# Patient Record
Sex: MALE | Race: BLACK OR AFRICAN AMERICAN | NOT HISPANIC OR LATINO | Employment: FULL TIME | URBAN - METROPOLITAN AREA
[De-identification: names, ages, dates, MRNs, and addresses within clinical notes are randomized per-mention and may not be internally consistent; named-entity substitution may affect disease eponyms.]

---

## 2017-09-12 ENCOUNTER — ALLSCRIPTS OFFICE VISIT (OUTPATIENT)
Dept: OTHER | Facility: OTHER | Age: 22
End: 2017-09-12

## 2017-09-12 DIAGNOSIS — E66.01 MORBID (SEVERE) OBESITY DUE TO EXCESS CALORIES (HCC): ICD-10-CM

## 2017-09-12 DIAGNOSIS — R63.5 ABNORMAL WEIGHT GAIN: ICD-10-CM

## 2017-09-12 DIAGNOSIS — I10 ESSENTIAL (PRIMARY) HYPERTENSION: ICD-10-CM

## 2017-09-12 DIAGNOSIS — L60.0 INGROWING NAIL: ICD-10-CM
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
## 2018-01-13 VITALS
RESPIRATION RATE: 16 BRPM | BODY MASS INDEX: 44.12 KG/M2 | HEIGHT: 70 IN | TEMPERATURE: 98.7 F | WEIGHT: 308.2 LBS | DIASTOLIC BLOOD PRESSURE: 100 MMHG | HEART RATE: 72 BPM | SYSTOLIC BLOOD PRESSURE: 160 MMHG

## 2018-04-16 RX ORDER — ENALAPRIL MALEATE 5 MG/1
1 TABLET ORAL 2 TIMES DAILY
COMMUNITY
Start: 2014-01-30 | End: 2018-04-17

## 2018-04-16 RX ORDER — ATENOLOL 25 MG/1
1 TABLET ORAL DAILY
COMMUNITY
Start: 2015-01-22 | End: 2018-04-17

## 2018-04-17 ENCOUNTER — OFFICE VISIT (OUTPATIENT)
Dept: FAMILY MEDICINE CLINIC | Facility: CLINIC | Age: 23
End: 2018-04-17
Payer: COMMERCIAL

## 2018-04-17 VITALS
DIASTOLIC BLOOD PRESSURE: 106 MMHG | TEMPERATURE: 98.2 F | WEIGHT: 315 LBS | BODY MASS INDEX: 46.65 KG/M2 | HEART RATE: 100 BPM | RESPIRATION RATE: 16 BRPM | OXYGEN SATURATION: 97 % | SYSTOLIC BLOOD PRESSURE: 158 MMHG | HEIGHT: 69 IN

## 2018-04-17 DIAGNOSIS — E66.01 MORBID OBESITY WITH BODY MASS INDEX OF 40.0-49.9 (HCC): ICD-10-CM

## 2018-04-17 DIAGNOSIS — J06.9 ACUTE URI: Primary | ICD-10-CM

## 2018-04-17 DIAGNOSIS — I10 ESSENTIAL HYPERTENSION: ICD-10-CM

## 2018-04-17 PROCEDURE — 99214 OFFICE O/P EST MOD 30 MIN: CPT | Performed by: NURSE PRACTITIONER

## 2018-04-17 PROCEDURE — 3008F BODY MASS INDEX DOCD: CPT | Performed by: NURSE PRACTITIONER

## 2018-04-17 PROCEDURE — 1036F TOBACCO NON-USER: CPT | Performed by: NURSE PRACTITIONER

## 2018-04-17 RX ORDER — ENALAPRIL MALEATE 5 MG/1
5 TABLET ORAL 2 TIMES DAILY
Qty: 30 TABLET | Refills: 5 | Status: SHIPPED | OUTPATIENT
Start: 2018-04-17

## 2018-04-17 RX ORDER — AZITHROMYCIN 250 MG/1
TABLET, FILM COATED ORAL
Qty: 6 TABLET | Refills: 0 | Status: SHIPPED | OUTPATIENT
Start: 2018-04-17 | End: 2018-04-21

## 2018-04-17 RX ORDER — ATENOLOL 25 MG/1
25 TABLET ORAL DAILY
Qty: 30 TABLET | Refills: 5 | Status: SHIPPED | OUTPATIENT
Start: 2018-04-17

## 2018-04-17 NOTE — PATIENT INSTRUCTIONS
Antibiotic sent to pharmacy   Increase fluids and rest  OTC Flonase and Delsym as needed  Warm salt water gargles, throat lozenges   OTC Tylenol as needed, not to exceed 3g/ 24 hours    Start Atenolol and Enalapril- both are at pharmacy   Check BP twice daily and keep a log   Bring log to follow up appt in 2 weeks   Follow a low sodium diet, work on weight loss  Schedule Sleep Study

## 2018-04-17 NOTE — PROGRESS NOTES
Chief Complaint   Patient presents with    Cough     5 days, sometimes productive cough    Nasal Congestion    Sore Throat     Assessment/Plan:    1  Acute URI- to start Azithromycin as ordered  SE reviewed  Increase PO fluids and rest   Warm salt water gargles, throat lozenges  OTC Delsym prn  OTC Flonase prn    2  HTN- uncontrolled  Pt is noncompliant with his medications  Both the Atenolol and Enalapril were refilled today and he was educated on the importance of compliance  I also discussed the potential complications of uncontrolled HTN and he verbalized understanding and agreement  He needs to check his BP BID and keep a log  Bring log to f/u appt in 2 weeks for BP check  Also bring BP machine to appt  Follow a low sodium diet, work on weight loss  I reprinted the order for the Sleep Study which was previously ordered by Dr Liv Fried 9/2017    3  Morbid obesity- advised regular exercise 30 minutes 5 x per week, work on weight loss  I recommended he consider seeing a Nutritionist but he declined this today     RTO 2 weeks for BP check      Diagnoses and all orders for this visit:    Acute URI  -     azithromycin (ZITHROMAX) 250 mg tablet; Take 2 tablets today then 1 tablet daily x 4 days    Essential hypertension  -     enalapril (VASOTEC) 5 mg tablet; Take 1 tablet (5 mg total) by mouth 2 (two) times a day  -     atenolol (TENORMIN) 25 mg tablet; Take 1 tablet (25 mg total) by mouth daily    Morbid obesity with body mass index of 40 0-49 9 ([facility])          Subjective:      Patient ID: Mechelle Post is a 20 y o  male  HPI   C/o a moist cough for the past 5 days  +nasal congestion   +sore throat  No rhinorrhea   No fevers, chills, SOB, wheezing  No N/V/D, appetite is normal   Needs a work note today     Non smoker  No H/O asthma    HTN- noncompliant  He is not taking the Enalapril or Atenolol      When I inquired why he is not taking these, he responded that he has heard about a lot of people with allergies to these meds  He himself has not experienced any allergic reactions or side effects with these meds  He is also not checking his BP at home  Denies chest pain, palpitations, edema, SOB, dizziness, change in vision, tinnitus, headaches  No regular physical activity   Unremarkable Kidney and Renal Artery U/S from 2015  Echo 2014 with EF at 67%  He had a sleep study ordered by Dr Zena Oliveira 9/2017 but he has not scheduled this yet         The following portions of the patient's history were reviewed and updated as appropriate: allergies, current medications, past medical history, past social history and problem list     Review of Systems   Constitutional: Negative for appetite change, chills, diaphoresis, fatigue and fever  HENT: Positive for congestion, postnasal drip and sore throat  Negative for ear discharge, ear pain, sinus pain, sinus pressure and tinnitus  Eyes: Negative for discharge, itching and visual disturbance  Respiratory: Positive for cough  Negative for chest tightness, shortness of breath and wheezing  Cardiovascular: Negative for chest pain, palpitations and leg swelling  Gastrointestinal: Negative for abdominal pain, constipation, diarrhea and nausea  Musculoskeletal: Negative for arthralgias and myalgias  Skin: Negative for rash  Neurological: Negative for dizziness, weakness, light-headedness, numbness and headaches  Hematological: Negative for adenopathy  Objective:      BP (!) 158/106 (BP Location: Left arm, Patient Position: Sitting)   Pulse 100   Temp 98 2 °F (36 8 °C) (Tympanic)   Resp 16   Ht 5' 9" (1 753 m)   Wt (!) 144 kg (317 lb)   SpO2 97%   BMI 46 81 kg/m²          Physical Exam   Constitutional: He is oriented to person, place, and time  He appears well-developed and well-nourished  No distress  Morbidly obese    HENT:   Head: Normocephalic and atraumatic     Right Ear: Tympanic membrane, external ear and ear canal normal    Left Ear: Tympanic membrane, external ear and ear canal normal    Nose: Mucosal edema and rhinorrhea present  Right sinus exhibits no maxillary sinus tenderness and no frontal sinus tenderness  Left sinus exhibits no maxillary sinus tenderness and no frontal sinus tenderness  Mouth/Throat: Posterior oropharyngeal erythema present  No oropharyngeal exudate  Eyes: Conjunctivae are normal  Pupils are equal, round, and reactive to light  Neck: Normal range of motion  Cardiovascular: Normal rate, regular rhythm and normal heart sounds  No murmur heard  Pulmonary/Chest: Effort normal and breath sounds normal  No respiratory distress  He has no wheezes  Abdominal: Soft  Bowel sounds are normal  He exhibits no distension  There is no tenderness  Musculoskeletal: Normal range of motion  He exhibits no edema  Lymphadenopathy:     He has no cervical adenopathy  Neurological: He is alert and oriented to person, place, and time  Skin: Skin is warm and dry  He is not diaphoretic  Psychiatric: He has a normal mood and affect

## 2018-04-17 NOTE — LETTER
April 17, 2018     Patient: Jose Ruiz   YOB: 1995   Date of Visit: 4/17/2018       To Whom it May Concern:    Jose Ruiz is under my professional care  He was seen in my office on 4/17/2018  He may return to work on 4/18/18  Please excuse from work on 4/16/18 and 4/17/18  If you have any questions or concerns, please don't hesitate to call           Sincerely,          MITRA Styles        CC: No Recipients

## 2022-02-23 ENCOUNTER — APPOINTMENT (EMERGENCY)
Dept: RADIOLOGY | Facility: HOSPITAL | Age: 27
DRG: 218 | End: 2022-02-23
Payer: OTHER MISCELLANEOUS

## 2022-02-23 ENCOUNTER — HOSPITAL ENCOUNTER (INPATIENT)
Facility: HOSPITAL | Age: 27
LOS: 10 days | Discharge: HOME HEALTH | DRG: 218 | End: 2022-03-05
Attending: STUDENT IN AN ORGANIZED HEALTH CARE EDUCATION/TRAINING PROGRAM | Admitting: STUDENT IN AN ORGANIZED HEALTH CARE EDUCATION/TRAINING PROGRAM
Payer: OTHER MISCELLANEOUS

## 2022-02-23 DIAGNOSIS — I50.9 CHRONIC CONGESTIVE HEART FAILURE, UNSPECIFIED HEART FAILURE TYPE (HCC): ICD-10-CM

## 2022-02-23 DIAGNOSIS — S82.892A CLOSED FRACTURE OF LEFT ANKLE, INITIAL ENCOUNTER: Primary | ICD-10-CM

## 2022-02-23 DIAGNOSIS — S82.872A CLOSED DISPLACED PILON FRACTURE OF LEFT TIBIA, INITIAL ENCOUNTER: ICD-10-CM
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
## 2022-02-23 LAB
2HR DELTA HS TROPONIN: 8 NG/L
4HR DELTA HS TROPONIN: 16 NG/L
ABO GROUP BLD: NORMAL
ABO GROUP BLD: NORMAL
ANION GAP SERPL CALCULATED.3IONS-SCNC: 5 MMOL/L (ref 4–13)
APTT PPP: 26 SECONDS (ref 23–37)
BASE EXCESS BLDA CALC-SCNC: 3 MMOL/L (ref -2–3)
BASOPHILS # BLD AUTO: 0.04 THOUSANDS/ΜL (ref 0–0.1)
BASOPHILS NFR BLD AUTO: 1 % (ref 0–1)
BLD GP AB SCN SERPL QL: NEGATIVE
BUN SERPL-MCNC: 11 MG/DL (ref 5–25)
CALCIUM SERPL-MCNC: 9 MG/DL (ref 8.3–10.1)
CARDIAC TROPONIN I PNL SERPL HS: 78 NG/L
CARDIAC TROPONIN I PNL SERPL HS: 86 NG/L
CARDIAC TROPONIN I PNL SERPL HS: 94 NG/L
CFFMA (FUNCTIONAL FIBRINOGEN MAX AMPLITUDE): 20.9 MM (ref 15–32)
CHLORIDE SERPL-SCNC: 105 MMOL/L (ref 100–108)
CKLY30: 0.4 % (ref 0–2.6)
CKR(REACTION TIME): 6.4 MIN (ref 4.6–9.1)
CO2 SERPL-SCNC: 28 MMOL/L (ref 21–32)
CREAT SERPL-MCNC: 1.25 MG/DL (ref 0.6–1.3)
CRTMA(RAPIDTEG MAX AMPLITUDE): 62.9 MM (ref 52–70)
EOSINOPHIL # BLD AUTO: 0.15 THOUSAND/ΜL (ref 0–0.61)
EOSINOPHIL NFR BLD AUTO: 2 % (ref 0–6)
ERYTHROCYTE [DISTWIDTH] IN BLOOD BY AUTOMATED COUNT: 13.7 % (ref 11.6–15.1)
GFR SERPL CREATININE-BSD FRML MDRD: 78 ML/MIN/1.73SQ M
GLUCOSE SERPL-MCNC: 108 MG/DL (ref 65–140)
GLUCOSE SERPL-MCNC: 163 MG/DL (ref 65–140)
GLUCOSE SERPL-MCNC: 166 MG/DL (ref 65–140)
GLUCOSE SERPL-MCNC: 209 MG/DL (ref 65–140)
HCO3 BLDA-SCNC: 29 MMOL/L (ref 24–30)
HCT VFR BLD AUTO: 44.6 % (ref 36.5–49.3)
HCT VFR BLD CALC: 43 % (ref 36.5–49.3)
HGB BLD-MCNC: 13.5 G/DL (ref 12–17)
HGB BLDA-MCNC: 14.6 G/DL (ref 12–17)
HOLD SPECIMEN: NORMAL
IMM GRANULOCYTES # BLD AUTO: 0.03 THOUSAND/UL (ref 0–0.2)
IMM GRANULOCYTES NFR BLD AUTO: 0 % (ref 0–2)
INR PPP: 1.01 (ref 0.84–1.19)
LYMPHOCYTES # BLD AUTO: 1.53 THOUSANDS/ΜL (ref 0.6–4.47)
LYMPHOCYTES NFR BLD AUTO: 20 % (ref 14–44)
MCH RBC QN AUTO: 27.2 PG (ref 26.8–34.3)
MCHC RBC AUTO-ENTMCNC: 30.3 G/DL (ref 31.4–37.4)
MCV RBC AUTO: 90 FL (ref 82–98)
MONOCYTES # BLD AUTO: 0.61 THOUSAND/ΜL (ref 0.17–1.22)
MONOCYTES NFR BLD AUTO: 8 % (ref 4–12)
NEUTROPHILS # BLD AUTO: 5.14 THOUSANDS/ΜL (ref 1.85–7.62)
NEUTS SEG NFR BLD AUTO: 69 % (ref 43–75)
NRBC BLD AUTO-RTO: 0 /100 WBCS
PCO2 BLD: 30 MMOL/L (ref 21–32)
PCO2 BLD: 49.6 MM HG (ref 42–50)
PH BLD: 7.37 [PH] (ref 7.3–7.4)
PLATELET # BLD AUTO: 257 THOUSANDS/UL (ref 149–390)
PMV BLD AUTO: 10.9 FL (ref 8.9–12.7)
PO2 BLD: 32 MM HG (ref 35–45)
POTASSIUM BLD-SCNC: 3.7 MMOL/L (ref 3.5–5.3)
POTASSIUM SERPL-SCNC: 3.6 MMOL/L (ref 3.5–5.3)
PROTHROMBIN TIME: 12.9 SECONDS (ref 11.6–14.5)
RBC # BLD AUTO: 4.96 MILLION/UL (ref 3.88–5.62)
RH BLD: POSITIVE
RH BLD: POSITIVE
SAO2 % BLD FROM PO2: 58 % (ref 60–85)
SODIUM BLD-SCNC: 141 MMOL/L (ref 136–145)
SODIUM SERPL-SCNC: 138 MMOL/L (ref 136–145)
SPECIMEN EXPIRATION DATE: NORMAL
SPECIMEN SOURCE: ABNORMAL
WBC # BLD AUTO: 7.5 THOUSAND/UL (ref 4.31–10.16)

## 2022-02-23 PROCEDURE — 82947 ASSAY GLUCOSE BLOOD QUANT: CPT

## 2022-02-23 PROCEDURE — 85025 COMPLETE CBC W/AUTO DIFF WBC: CPT | Performed by: STUDENT IN AN ORGANIZED HEALTH CARE EDUCATION/TRAINING PROGRAM

## 2022-02-23 PROCEDURE — 36415 COLL VENOUS BLD VENIPUNCTURE: CPT

## 2022-02-23 PROCEDURE — 82948 REAGENT STRIP/BLOOD GLUCOSE: CPT

## 2022-02-23 PROCEDURE — G1004 CDSM NDSC: HCPCS

## 2022-02-23 PROCEDURE — 86901 BLOOD TYPING SEROLOGIC RH(D): CPT | Performed by: STUDENT IN AN ORGANIZED HEALTH CARE EDUCATION/TRAINING PROGRAM

## 2022-02-23 PROCEDURE — 86850 RBC ANTIBODY SCREEN: CPT | Performed by: STUDENT IN AN ORGANIZED HEALTH CARE EDUCATION/TRAINING PROGRAM

## 2022-02-23 PROCEDURE — 73610 X-RAY EXAM OF ANKLE: CPT

## 2022-02-23 PROCEDURE — 99285 EMERGENCY DEPT VISIT HI MDM: CPT

## 2022-02-23 PROCEDURE — 71260 CT THORAX DX C+: CPT

## 2022-02-23 PROCEDURE — 85397 CLOTTING FUNCT ACTIVITY: CPT | Performed by: STUDENT IN AN ORGANIZED HEALTH CARE EDUCATION/TRAINING PROGRAM

## 2022-02-23 PROCEDURE — 84132 ASSAY OF SERUM POTASSIUM: CPT

## 2022-02-23 PROCEDURE — 99223 1ST HOSP IP/OBS HIGH 75: CPT | Performed by: STUDENT IN AN ORGANIZED HEALTH CARE EDUCATION/TRAINING PROGRAM

## 2022-02-23 PROCEDURE — 74177 CT ABD & PELVIS W/CONTRAST: CPT

## 2022-02-23 PROCEDURE — 96374 THER/PROPH/DIAG INJ IV PUSH: CPT

## 2022-02-23 PROCEDURE — NC001 PR NO CHARGE: Performed by: EMERGENCY MEDICINE

## 2022-02-23 PROCEDURE — 85730 THROMBOPLASTIN TIME PARTIAL: CPT | Performed by: ORTHOPAEDIC SURGERY

## 2022-02-23 PROCEDURE — 85610 PROTHROMBIN TIME: CPT | Performed by: ORTHOPAEDIC SURGERY

## 2022-02-23 PROCEDURE — 82803 BLOOD GASES ANY COMBINATION: CPT

## 2022-02-23 PROCEDURE — 73700 CT LOWER EXTREMITY W/O DYE: CPT

## 2022-02-23 PROCEDURE — 70450 CT HEAD/BRAIN W/O DYE: CPT

## 2022-02-23 PROCEDURE — 71045 X-RAY EXAM CHEST 1 VIEW: CPT

## 2022-02-23 PROCEDURE — 85384 FIBRINOGEN ACTIVITY: CPT | Performed by: STUDENT IN AN ORGANIZED HEALTH CARE EDUCATION/TRAINING PROGRAM

## 2022-02-23 PROCEDURE — 85014 HEMATOCRIT: CPT

## 2022-02-23 PROCEDURE — 73590 X-RAY EXAM OF LOWER LEG: CPT

## 2022-02-23 PROCEDURE — 93005 ELECTROCARDIOGRAM TRACING: CPT

## 2022-02-23 PROCEDURE — 84484 ASSAY OF TROPONIN QUANT: CPT | Performed by: STUDENT IN AN ORGANIZED HEALTH CARE EDUCATION/TRAINING PROGRAM

## 2022-02-23 PROCEDURE — 84295 ASSAY OF SERUM SODIUM: CPT

## 2022-02-23 PROCEDURE — 85576 BLOOD PLATELET AGGREGATION: CPT | Performed by: STUDENT IN AN ORGANIZED HEALTH CARE EDUCATION/TRAINING PROGRAM

## 2022-02-23 PROCEDURE — 85347 COAGULATION TIME ACTIVATED: CPT | Performed by: STUDENT IN AN ORGANIZED HEALTH CARE EDUCATION/TRAINING PROGRAM

## 2022-02-23 PROCEDURE — 86900 BLOOD TYPING SEROLOGIC ABO: CPT | Performed by: STUDENT IN AN ORGANIZED HEALTH CARE EDUCATION/TRAINING PROGRAM

## 2022-02-23 PROCEDURE — 80048 BASIC METABOLIC PNL TOTAL CA: CPT | Performed by: STUDENT IN AN ORGANIZED HEALTH CARE EDUCATION/TRAINING PROGRAM

## 2022-02-23 PROCEDURE — 72125 CT NECK SPINE W/O DYE: CPT

## 2022-02-23 RX ORDER — POLYETHYLENE GLYCOL 3350 17 G/17G
17 POWDER, FOR SOLUTION ORAL DAILY
Status: DISCONTINUED | OUTPATIENT
Start: 2022-02-23 | End: 2022-03-05 | Stop reason: HOSPADM

## 2022-02-23 RX ORDER — FENTANYL CITRATE 50 UG/ML
INJECTION, SOLUTION INTRAMUSCULAR; INTRAVENOUS CODE/TRAUMA/SEDATION MEDICATION
Status: COMPLETED | OUTPATIENT
Start: 2022-02-23 | End: 2022-02-23

## 2022-02-23 RX ORDER — CARVEDILOL 25 MG/1
25 TABLET ORAL 2 TIMES DAILY WITH MEALS
COMMUNITY

## 2022-02-23 RX ORDER — SPIRONOLACTONE 25 MG/1
25 TABLET ORAL DAILY
Status: DISCONTINUED | OUTPATIENT
Start: 2022-02-23 | End: 2022-02-24

## 2022-02-23 RX ORDER — OXYCODONE HYDROCHLORIDE 5 MG/1
5 TABLET ORAL EVERY 4 HOURS PRN
Status: DISCONTINUED | OUTPATIENT
Start: 2022-02-23 | End: 2022-03-05 | Stop reason: HOSPADM

## 2022-02-23 RX ORDER — OXYCODONE HYDROCHLORIDE 10 MG/1
10 TABLET ORAL EVERY 4 HOURS PRN
Status: DISCONTINUED | OUTPATIENT
Start: 2022-02-23 | End: 2022-03-05 | Stop reason: HOSPADM

## 2022-02-23 RX ORDER — SPIRONOLACTONE 25 MG/1
25 TABLET ORAL DAILY
COMMUNITY

## 2022-02-23 RX ORDER — DOCUSATE SODIUM 100 MG/1
100 CAPSULE, LIQUID FILLED ORAL 2 TIMES DAILY
Status: DISCONTINUED | OUTPATIENT
Start: 2022-02-23 | End: 2022-03-05 | Stop reason: HOSPADM

## 2022-02-23 RX ORDER — ISOSORBIDE DINITRATE AND HYDRALAZINE HYDROCHLORIDE 37.5; 2 MG/1; MG/1
2 TABLET ORAL 3 TIMES DAILY
COMMUNITY

## 2022-02-23 RX ORDER — ATORVASTATIN CALCIUM 20 MG/1
20 TABLET, FILM COATED ORAL DAILY
COMMUNITY

## 2022-02-23 RX ORDER — SODIUM CHLORIDE, SODIUM GLUCONATE, SODIUM ACETATE, POTASSIUM CHLORIDE, MAGNESIUM CHLORIDE, SODIUM PHOSPHATE, DIBASIC, AND POTASSIUM PHOSPHATE .53; .5; .37; .037; .03; .012; .00082 G/100ML; G/100ML; G/100ML; G/100ML; G/100ML; G/100ML; G/100ML
125 INJECTION, SOLUTION INTRAVENOUS CONTINUOUS
Status: DISCONTINUED | OUTPATIENT
Start: 2022-02-23 | End: 2022-02-24

## 2022-02-23 RX ORDER — CALCIUM CARBONATE 200(500)MG
500 TABLET,CHEWABLE ORAL DAILY PRN
Status: DISCONTINUED | OUTPATIENT
Start: 2022-02-23 | End: 2022-03-05 | Stop reason: HOSPADM

## 2022-02-23 RX ORDER — IVABRADINE 5 MG/1
5 TABLET, FILM COATED ORAL 2 TIMES DAILY
COMMUNITY

## 2022-02-23 RX ORDER — LABETALOL 20 MG/4 ML (5 MG/ML) INTRAVENOUS SYRINGE
10 ONCE
Status: COMPLETED | OUTPATIENT
Start: 2022-02-23 | End: 2022-02-23

## 2022-02-23 RX ORDER — CARVEDILOL 25 MG/1
25 TABLET ORAL 2 TIMES DAILY WITH MEALS
Status: DISCONTINUED | OUTPATIENT
Start: 2022-02-23 | End: 2022-02-25

## 2022-02-23 RX ORDER — ACETAMINOPHEN 325 MG/1
975 TABLET ORAL EVERY 8 HOURS SCHEDULED
Status: DISCONTINUED | OUTPATIENT
Start: 2022-02-23 | End: 2022-03-05 | Stop reason: HOSPADM

## 2022-02-23 RX ORDER — HYDROMORPHONE HCL/PF 1 MG/ML
0.5 SYRINGE (ML) INJECTION EVERY 4 HOURS PRN
Status: DISCONTINUED | OUTPATIENT
Start: 2022-02-23 | End: 2022-03-05 | Stop reason: HOSPADM

## 2022-02-23 RX ORDER — SACUBITRIL AND VALSARTAN 97; 103 MG/1; MG/1
1 TABLET, FILM COATED ORAL 2 TIMES DAILY
COMMUNITY

## 2022-02-23 RX ORDER — EMPAGLIFLOZIN AND METFORMIN HYDROCHLORIDE 12.5; 5 MG/1; MG/1
TABLET ORAL 2 TIMES DAILY
COMMUNITY

## 2022-02-23 RX ORDER — ATORVASTATIN CALCIUM 20 MG/1
20 TABLET, FILM COATED ORAL DAILY
Status: DISCONTINUED | OUTPATIENT
Start: 2022-02-23 | End: 2022-03-05 | Stop reason: HOSPADM

## 2022-02-23 RX ADMIN — OXYCODONE HYDROCHLORIDE 10 MG: 10 TABLET ORAL at 23:35

## 2022-02-23 RX ADMIN — SPIRONOLACTONE 25 MG: 25 TABLET ORAL at 16:57

## 2022-02-23 RX ADMIN — IOHEXOL 100 ML: 350 INJECTION, SOLUTION INTRAVENOUS at 15:10

## 2022-02-23 RX ADMIN — LABETALOL HYDROCHLORIDE 10 MG: 5 INJECTION, SOLUTION INTRAVENOUS at 16:54

## 2022-02-23 RX ADMIN — ACETAMINOPHEN 975 MG: 325 TABLET ORAL at 21:20

## 2022-02-23 RX ADMIN — ACETAMINOPHEN 975 MG: 325 TABLET ORAL at 15:55

## 2022-02-23 RX ADMIN — OXYCODONE HYDROCHLORIDE 10 MG: 10 TABLET ORAL at 18:52

## 2022-02-23 RX ADMIN — SODIUM CHLORIDE, SODIUM GLUCONATE, SODIUM ACETATE, POTASSIUM CHLORIDE, MAGNESIUM CHLORIDE, SODIUM PHOSPHATE, DIBASIC, AND POTASSIUM PHOSPHATE 125 ML/HR: .53; .5; .37; .037; .03; .012; .00082 INJECTION, SOLUTION INTRAVENOUS at 16:02

## 2022-02-23 RX ADMIN — FENTANYL CITRATE 100 MCG: 50 INJECTION INTRAMUSCULAR; INTRAVENOUS at 14:59

## 2022-02-23 RX ADMIN — CALCIUM CARBONATE (ANTACID) CHEW TAB 500 MG 500 MG: 500 CHEW TAB at 23:28

## 2022-02-23 RX ADMIN — CARVEDILOL 25 MG: 25 TABLET, FILM COATED ORAL at 16:57

## 2022-02-23 RX ADMIN — ATORVASTATIN CALCIUM 20 MG: 20 TABLET, FILM COATED ORAL at 16:57
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
## 2022-02-24 PROBLEM — E78.5 DYSLIPIDEMIA: Status: ACTIVE | Noted: 2022-02-24

## 2022-02-24 PROBLEM — E11.9 DIABETES TYPE 2, CONTROLLED (HCC): Status: ACTIVE | Noted: 2022-02-24

## 2022-02-24 PROBLEM — G47.33 OSA (OBSTRUCTIVE SLEEP APNEA): Status: ACTIVE | Noted: 2022-02-24

## 2022-02-24 PROBLEM — S82.302A CLOSED FRACTURE OF DISTAL END OF LEFT TIBIA: Status: ACTIVE | Noted: 2022-02-24

## 2022-02-24 PROBLEM — I50.9 CHRONIC HEART FAILURE (HCC): Status: ACTIVE | Noted: 2022-02-24

## 2022-02-24 PROBLEM — G89.11 ACUTE PAIN DUE TO TRAUMA: Status: ACTIVE | Noted: 2022-02-24

## 2022-02-24 LAB
ANION GAP SERPL CALCULATED.3IONS-SCNC: 5 MMOL/L (ref 4–13)
APTT PPP: 26 SECONDS (ref 23–37)
ATRIAL RATE: 119 BPM
BASOPHILS # BLD AUTO: 0.04 THOUSANDS/ΜL (ref 0–0.1)
BASOPHILS NFR BLD AUTO: 1 % (ref 0–1)
BUN SERPL-MCNC: 10 MG/DL (ref 5–25)
CALCIUM SERPL-MCNC: 8.8 MG/DL (ref 8.3–10.1)
CHLORIDE SERPL-SCNC: 107 MMOL/L (ref 100–108)
CO2 SERPL-SCNC: 27 MMOL/L (ref 21–32)
CREAT SERPL-MCNC: 1.01 MG/DL (ref 0.6–1.3)
EOSINOPHIL # BLD AUTO: 0.19 THOUSAND/ΜL (ref 0–0.61)
EOSINOPHIL NFR BLD AUTO: 3 % (ref 0–6)
ERYTHROCYTE [DISTWIDTH] IN BLOOD BY AUTOMATED COUNT: 13.6 % (ref 11.6–15.1)
GFR SERPL CREATININE-BSD FRML MDRD: 101 ML/MIN/1.73SQ M
GLUCOSE SERPL-MCNC: 116 MG/DL (ref 65–140)
GLUCOSE SERPL-MCNC: 119 MG/DL (ref 65–140)
GLUCOSE SERPL-MCNC: 130 MG/DL (ref 65–140)
GLUCOSE SERPL-MCNC: 134 MG/DL (ref 65–140)
GLUCOSE SERPL-MCNC: 183 MG/DL (ref 65–140)
HCT VFR BLD AUTO: 39.1 % (ref 36.5–49.3)
HGB BLD-MCNC: 12.3 G/DL (ref 12–17)
IMM GRANULOCYTES # BLD AUTO: 0.04 THOUSAND/UL (ref 0–0.2)
IMM GRANULOCYTES NFR BLD AUTO: 1 % (ref 0–2)
INR PPP: 1.06 (ref 0.84–1.19)
LYMPHOCYTES # BLD AUTO: 1.26 THOUSANDS/ΜL (ref 0.6–4.47)
LYMPHOCYTES NFR BLD AUTO: 16 % (ref 14–44)
MAGNESIUM SERPL-MCNC: 2.6 MG/DL (ref 1.6–2.6)
MCH RBC QN AUTO: 27.6 PG (ref 26.8–34.3)
MCHC RBC AUTO-ENTMCNC: 31.5 G/DL (ref 31.4–37.4)
MCV RBC AUTO: 88 FL (ref 82–98)
MONOCYTES # BLD AUTO: 0.86 THOUSAND/ΜL (ref 0.17–1.22)
MONOCYTES NFR BLD AUTO: 11 % (ref 4–12)
NEUTROPHILS # BLD AUTO: 5.33 THOUSANDS/ΜL (ref 1.85–7.62)
NEUTS SEG NFR BLD AUTO: 68 % (ref 43–75)
NRBC BLD AUTO-RTO: 0 /100 WBCS
P AXIS: 51 DEGREES
PLATELET # BLD AUTO: 237 THOUSANDS/UL (ref 149–390)
PMV BLD AUTO: 10.8 FL (ref 8.9–12.7)
POTASSIUM SERPL-SCNC: 4.3 MMOL/L (ref 3.5–5.3)
PR INTERVAL: 172 MS
PROTHROMBIN TIME: 13.4 SECONDS (ref 11.6–14.5)
QRS AXIS: 78 DEGREES
QRSD INTERVAL: 94 MS
QT INTERVAL: 314 MS
QTC INTERVAL: 441 MS
RBC # BLD AUTO: 4.45 MILLION/UL (ref 3.88–5.62)
SODIUM SERPL-SCNC: 139 MMOL/L (ref 136–145)
T WAVE AXIS: 48 DEGREES
VENTRICULAR RATE: 119 BPM
WBC # BLD AUTO: 7.72 THOUSAND/UL (ref 4.31–10.16)

## 2022-02-24 PROCEDURE — 93010 ELECTROCARDIOGRAM REPORT: CPT | Performed by: INTERNAL MEDICINE

## 2022-02-24 PROCEDURE — 85610 PROTHROMBIN TIME: CPT

## 2022-02-24 PROCEDURE — 83735 ASSAY OF MAGNESIUM: CPT | Performed by: SURGERY

## 2022-02-24 PROCEDURE — 99255 IP/OBS CONSLTJ NEW/EST HI 80: CPT | Performed by: INTERNAL MEDICINE

## 2022-02-24 PROCEDURE — 85025 COMPLETE CBC W/AUTO DIFF WBC: CPT | Performed by: SURGERY

## 2022-02-24 PROCEDURE — 99233 SBSQ HOSP IP/OBS HIGH 50: CPT | Performed by: STUDENT IN AN ORGANIZED HEALTH CARE EDUCATION/TRAINING PROGRAM

## 2022-02-24 PROCEDURE — 80048 BASIC METABOLIC PNL TOTAL CA: CPT | Performed by: SURGERY

## 2022-02-24 PROCEDURE — NC001 PR NO CHARGE: Performed by: ORTHOPAEDIC SURGERY

## 2022-02-24 PROCEDURE — 85730 THROMBOPLASTIN TIME PARTIAL: CPT

## 2022-02-24 PROCEDURE — 99253 IP/OBS CNSLTJ NEW/EST LOW 45: CPT | Performed by: ORTHOPAEDIC SURGERY

## 2022-02-24 PROCEDURE — 82948 REAGENT STRIP/BLOOD GLUCOSE: CPT

## 2022-02-24 RX ORDER — SODIUM CHLORIDE, SODIUM LACTATE, POTASSIUM CHLORIDE, CALCIUM CHLORIDE 600; 310; 30; 20 MG/100ML; MG/100ML; MG/100ML; MG/100ML
50 INJECTION, SOLUTION INTRAVENOUS CONTINUOUS
Status: DISCONTINUED | OUTPATIENT
Start: 2022-02-25 | End: 2022-02-25

## 2022-02-24 RX ADMIN — SACUBITRIL AND VALSARTAN 1 TABLET: 97; 103 TABLET, FILM COATED ORAL at 17:00

## 2022-02-24 RX ADMIN — ACETAMINOPHEN 975 MG: 325 TABLET ORAL at 21:02

## 2022-02-24 RX ADMIN — DOCUSATE SODIUM 100 MG: 100 CAPSULE ORAL at 17:00

## 2022-02-24 RX ADMIN — SODIUM CHLORIDE, SODIUM LACTATE, POTASSIUM CHLORIDE, AND CALCIUM CHLORIDE 50 ML/HR: .6; .31; .03; .02 INJECTION, SOLUTION INTRAVENOUS at 23:38

## 2022-02-24 RX ADMIN — ACETAMINOPHEN 975 MG: 325 TABLET ORAL at 05:45

## 2022-02-24 RX ADMIN — ACETAMINOPHEN 975 MG: 325 TABLET ORAL at 14:07

## 2022-02-24 RX ADMIN — CARVEDILOL 25 MG: 25 TABLET, FILM COATED ORAL at 16:59

## 2022-02-24 RX ADMIN — CARVEDILOL 25 MG: 25 TABLET, FILM COATED ORAL at 08:27

## 2022-02-24 RX ADMIN — ENOXAPARIN SODIUM 60 MG: 60 INJECTION SUBCUTANEOUS at 21:06

## 2022-02-24 RX ADMIN — ATORVASTATIN CALCIUM 20 MG: 20 TABLET, FILM COATED ORAL at 08:27

## 2022-02-24 RX ADMIN — OXYCODONE HYDROCHLORIDE 10 MG: 10 TABLET ORAL at 08:27

## 2022-02-24 RX ADMIN — SPIRONOLACTONE 25 MG: 25 TABLET ORAL at 08:27

## 2022-02-24 RX ADMIN — ENOXAPARIN SODIUM 60 MG: 60 INJECTION SUBCUTANEOUS at 12:50

## 2022-02-24 RX ADMIN — OXYCODONE HYDROCHLORIDE 10 MG: 10 TABLET ORAL at 16:59

## 2022-02-24 NOTE — ASSESSMENT & PLAN NOTE
- left tibial fracture, present on admission   - operative planning on 02/24/2022  - Appreciate Orthopedic surgery evaluation, recommendations and interventions as noted  - Maintain non weightbearing status on the left lower extremity in splint   - Monitor left lower extremity neurovascular exam   - Continue multimodal analgesic regimen   - Continue DVT prophylaxis; 60 mg b i d   - PT and OT evaluation and treatment as indicated  - Outpatient follow up with Orthopedic surgery for re-evaluation  Imiquimod Pregnancy And Lactation Text: This medication is Pregnancy Category C. It is unknown if this medication is excreted in breast milk.
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
## 2022-02-24 NOTE — PLAN OF CARE
Problem: MOBILITY - ADULT  Goal: Maintain or return to baseline ADL function  Description: INTERVENTIONS:  -  Assess patient's ability to carry out ADLs; assess patient's baseline for ADL function and identify physical deficits which impact ability to perform ADLs (bathing, care of mouth/teeth, toileting, grooming, dressing, etc )  - Assess/evaluate cause of self-care deficits   - Assess range of motion  - Assess patient's mobility; develop plan if impaired  - Assess patient's need for assistive devices and provide as appropriate  - Encourage maximum independence but intervene and supervise when necessary  - Involve family in performance of ADLs  - Assess for home care needs following discharge   - Consider OT consult to assist with ADL evaluation and planning for discharge  - Provide patient education as appropriate  Outcome: Progressing  Goal: Maintains/Returns to pre admission functional level  Description: INTERVENTIONS:  - Perform BMAT or MOVE assessment daily    - Set and communicate daily mobility goal to care team and patient/family/caregiver     - Collaborate with rehabilitation services on mobility goals if consulted  - Out of bed for toileting  - Record patient progress and toleration of activity level   Outcome: Progressing     Problem: PAIN - ADULT  Goal: Verbalizes/displays adequate comfort level or baseline comfort level  Description: Interventions:  - Encourage patient to monitor pain and request assistance  - Assess pain using appropriate pain scale  - Administer analgesics based on type and severity of pain and evaluate response  - Implement non-pharmacological measures as appropriate and evaluate response  - Consider cultural and social influences on pain and pain management  - Notify physician/advanced practitioner if interventions unsuccessful or patient reports new pain  Outcome: Progressing     Problem: INFECTION - ADULT  Goal: Absence or prevention of progression during hospitalization  Description: INTERVENTIONS:  - Assess and monitor for signs and symptoms of infection  - Monitor lab/diagnostic results  - Monitor all insertion sites, i e  indwelling lines, tubes, and drains  - Monitor endotracheal if appropriate and nasal secretions for changes in amount and color  - [place] appropriate cooling/warming therapies per order  - Administer medications as ordered  - Instruct and encourage patient and family to use good hand hygiene technique  - Identify and instruct in appropriate isolation precautions for identified infection/condition  Outcome: Progressing  Goal: Absence of fever/infection during neutropenic period  Description: INTERVENTIONS:  - Monitor WBC    Outcome: Progressing     Problem: SAFETY ADULT  Goal: Maintain or return to baseline ADL function  Description: INTERVENTIONS:  -  Assess patient's ability to carry out ADLs; assess patient's baseline for ADL function and identify physical deficits which impact ability to perform ADLs (bathing, care of mouth/teeth, toileting, grooming, dressing, etc )  - Assess/evaluate cause of self-care deficits   - Assess range of motion  - Assess patient's mobility; develop plan if impaired  - Assess patient's need for assistive devices and provide as appropriate  - Encourage maximum independence but intervene and supervise when necessary  - Involve family in performance of ADLs  - Assess for home care needs following discharge   - Consider OT consult to assist with ADL evaluation and planning for discharge  - Provide patient education as appropriate  Outcome: Progressing  Goal: Maintains/Returns to pre admission functional level  Description: INTERVENTIONS:  - Perform BMAT or MOVE assessment daily    - Set and communicate daily mobility goal to care team and patient/family/caregiver     - Collaborate with rehabilitation services on mobility goals if consulted  - Out of bed for toileting  - Record patient progress and toleration of activity level   Outcome: Progressing  Goal: Patient will remain free of falls  Description: INTERVENTIONS:  - Educate patient/family on patient safety including physical limitations  - Instruct patient to call for assistance with activity   - Consult OT/PT to assist with strengthening/mobility   - Keep Call bell within reach  - Keep bed low and locked with side rails adjusted as appropriate  - Keep care items and personal belongings within reach  - Initiate and maintain comfort rounds  - Make Fall Risk Sign visible to staff  - Offer Toileting every 2 Hours, in advance of need  - Initiate/Maintain bed alarm  - Obtain necessary fall risk management equipment  - Apply yellow socks and bracelet for high fall risk patients  - Consider moving patient to room near nurses station  Outcome: Progressing     Problem: DISCHARGE PLANNING  Goal: Discharge to home or other facility with appropriate resources  Description: INTERVENTIONS:  - Identify barriers to discharge w/patient and caregiver  - Arrange for needed discharge resources and transportation as appropriate  - Identify discharge learning needs (meds, wound care, etc )  - Arrange for interpretive services to assist at discharge as needed  - Refer to Case Management Department for coordinating discharge planning if the patient needs post-hospital services based on physician/advanced practitioner order or complex needs related to functional status, cognitive ability, or social support system  Outcome: Progressing     Problem: Knowledge Deficit  Goal: Patient/family/caregiver demonstrates understanding of disease process, treatment plan, medications, and discharge instructions  Description: Complete learning assessment and assess knowledge base    Interventions:  - Provide teaching at level of understanding  - Provide teaching via preferred learning methods  Outcome: Progressing
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
## 2022-02-24 NOTE — PROGRESS NOTES
Progress Note - Orthopedics   Luigi Shark 31 y o  male MRN: [id number]  Unit/Bed#: [facility] 611-01      Subjective:    31 y o male with left distal tibia fracture  Pain controlled  No further issues overnight       Labs:  0   Lab Value Date/Time    HCT 39 1 02/24/2022 0526    HCT 44 6 02/23/2022 1502    HCT 43 02/23/2022 1501    HGB 12 3 02/24/2022 0526    HGB 13 5 02/23/2022 1502    HGB 14 6 02/23/2022 1501    INR 1 06 02/24/2022 0526    WBC 7 72 02/24/2022 0526    WBC 7 50 02/23/2022 1502       Meds:    Current Facility-Administered Medications:     acetaminophen (TYLENOL) tablet 975 mg, 975 mg, Oral, Q8H [address], Jaylen Liu MD, 975 mg at 02/24/22 0545    atorvastatin (LIPITOR) tablet 20 mg, 20 mg, Oral, Daily, Jaylen Liu MD, 20 mg at 02/23/22 1657    calcium carbonate (TUMS) chewable tablet 500 mg, 500 mg, Oral, Daily PRN, Tyler Quinones MD, 500 mg at 02/23/22 2328    carvedilol (COREG) tablet 25 mg, 25 mg, Oral, BID With Meals, Jaylen Liu MD, 25 mg at 02/23/22 1657    docusate sodium (COLACE) capsule 100 mg, 100 mg, Oral, BID, Jaylen Liu MD    HYDROmorphone (DILAUDID) injection 0 5 mg, 0 5 mg, Intravenous, Q4H PRN, Jaylen Liu MD    insulin lispro (HumaLOG) 100 units/mL subcutaneous injection 2-12 Units, 2-12 Units, Subcutaneous, Q6H [address] **AND** Fingerstick Glucose (POCT), , , Q6H, Tray Chen MD    multi-electrolyte (PLASMALYTE-A/ISOLYTE-S PH 7 4) IV solution, 125 mL/hr, Intravenous, Continuous, Tray Chen MD, Last Rate: 125 mL/hr at 02/23/22 1602, 125 mL/hr at 02/23/22 1602    oxyCODONE (ROXICODONE) immediate release tablet 10 mg, 10 mg, Oral, Q4H PRN, Jaylen Liu MD, 10 mg at 02/23/22 2335    oxyCODONE (ROXICODONE) IR tablet 5 mg, 5 mg, Oral, Q4H PRN, Jaylen Liu MD    polyethylene glycol (MIRALAX) packet 17 g, 17 g, Oral, Daily, Jaylen Liu MD    spironolactone (ALDACTONE) tablet 25 mg, 25 mg, Oral, Daily, Emmy Lee MD, 25 mg at 02/23/22 2197    Blood Culture:   No results found for: BLOODCX    Wound Culture:   No results found for: WOUNDCULT    Ins and Outs:  I/O last 24 hours: In: -   Out: 350 [Urine:350]          Physical:  Vitals:    02/24/22 0324   BP: 148/97   Pulse: 85   Resp: 20   Temp: 98 °F (36 7 °C)   SpO2: 91%     Musculoskeletal: left Lower Extremity  · Splint in place  · TTP at the distal tibia  · Calf soft and compressible   · Sensation intact to light touch jaki/saph/sp/dp/tib to exposed areas  · Motor intact EHL/FHL  · Toes warm and well perfused      Assessment:    26 y o male with left distal tibia fracture that would benefit from operative fixation should patient be able to be optimized from a medical and cardiac standpoint        Plan:  · NWB LLE in splint  · Will monitor for ABLA   · NPO  · OR today if cleared from medical standpoint   · PT/OT post op   · Pain control  · DVT PPX: hold this AM in preparation for surgery  · Dispo: Ortho will follow    Shahrzad Ignacio MD

## 2022-02-24 NOTE — ASSESSMENT & PLAN NOTE
Wt Readings from Last 3 Encounters:   02/23/22 (!) 160 kg (352 lb 11 8 oz)     - EF baseline is 20-25%  - cardiology following, appreciate input  - resume home medications  - outpatient follow-up with Cardiology  - no other acute workup at this time  - will need to resume all CHF medications on 02/25/2020; pending cardiology follow-up  - currently 25 mg b i d , isosorbide-hydralazine 40-75 mg t i d , ivabradine 5 mg b i d , sacubitril-valsartan  mg b i d , and spironolactone 25 mg daily

## 2022-02-24 NOTE — PHYSICAL THERAPY NOTE
Physical Therapy Cancellation Note    PT orders received and chart reviewed  Pt pending OR with ortho for fixation of L distal tibia fx  Will defer PT eval at this time      Guzman Parekh, PT, DPT

## 2022-02-24 NOTE — ASSESSMENT & PLAN NOTE
Wt Readings from Last 3 Encounters:   02/23/22 (!) 160 kg (352 lb 11 8 oz)     - EF baseline is 20-25%  - cardiology following, appreciate input  - resume home medications  - outpatient follow-up with Cardiology  - no other acute workup at this time  - will need to resume all CHF medications on 02/25/2020; pending cardiology follow-up  - currently taking 25 mg b i d , sacubitril-valsartan  mg b i d   - Will resume spironolactone 25 mg daily postop

## 2022-02-24 NOTE — ASSESSMENT & PLAN NOTE
No results found for: HGBA1C    Recent Labs     02/23/22  2333 02/24/22  0103 02/24/22  0544 02/24/22  1129   POCGLU 209* 183* 119 130       Blood Sugar Average: Last 72 hrs:  (P) 149 8     - continue sliding scale insulin  - close glucose control in the setting of orthopedic injury

## 2022-02-24 NOTE — ASSESSMENT & PLAN NOTE
No results found for: HGBA1C    Recent Labs     02/23/22  2333 02/24/22  0103 02/24/22  0544 02/24/22  1129   POCGLU 209* 183* 119 130       Blood Sugar Average: Last 72 hrs:  (P) 149 8     - continue sliding scale insulin  - close glucose control in the setting of orthopedic injury Adult
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
## 2022-02-24 NOTE — OCCUPATIONAL THERAPY NOTE
Occupational Therapy         Patient Name: Esteban NOVA Date: 2/24/2022 02/24/22 0900   OT Last Visit   OT Visit Date 02/24/22   Note Type   Note type Cancelled Session   Cancel Reasons Patient to operating room     Pending OR for fixation of ankle fx - will defer and address OT needs /discharge recommendations post op    Dc Jones, OT
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
## 2022-02-24 NOTE — ASSESSMENT & PLAN NOTE
- left tibial fracture, present on admission   - operative planning on 02/25/2022  - Appreciate Orthopedic surgery evaluation, recommendations and interventions as noted  - Maintain non weightbearing status on the left lower extremity in splint   - Monitor left lower extremity neurovascular exam   - Continue multimodal analgesic regimen   - Continue DVT prophylaxis; 60 mg b i d   - PT and OT evaluation and treatment as indicated  - Outpatient follow up with Orthopedic surgery for re-evaluation

## 2022-02-24 NOTE — PLAN OF CARE
Problem: MOBILITY - ADULT  Goal: Maintain or return to baseline ADL function  Description: INTERVENTIONS:  -  Assess patient's ability to carry out ADLs; assess patient's baseline for ADL function and identify physical deficits which impact ability to perform ADLs (bathing, care of mouth/teeth, toileting, grooming, dressing, etc )  - Assess/evaluate cause of self-care deficits   - Assess range of motion  - Assess patient's mobility; develop plan if impaired  - Assess patient's need for assistive devices and provide as appropriate  - Encourage maximum independence but intervene and supervise when necessary  - Involve family in performance of ADLs  - Assess for home care needs following discharge   - Consider OT consult to assist with ADL evaluation and planning for discharge  - Provide patient education as appropriate  Outcome: Progressing  Goal: Maintains/Returns to pre admission functional level  Description: INTERVENTIONS:  - Perform BMAT or MOVE assessment daily    - Set and communicate daily mobility goal to care team and patient/family/caregiver     - Collaborate with rehabilitation services on mobility goals if consulted  - Out of bed for toileting  - Record patient progress and toleration of activity level   Outcome: Progressing     Problem: PAIN - ADULT  Goal: Verbalizes/displays adequate comfort level or baseline comfort level  Description: Interventions:  - Encourage patient to monitor pain and request assistance  - Assess pain using appropriate pain scale  - Administer analgesics based on type and severity of pain and evaluate response  - Implement non-pharmacological measures as appropriate and evaluate response  - Consider cultural and social influences on pain and pain management  - Notify physician/advanced practitioner if interventions unsuccessful or patient reports new pain  Outcome: Progressing     Problem: INFECTION - ADULT  Goal: Absence or prevention of progression during hospitalization  Description: INTERVENTIONS:  - Assess and monitor for signs and symptoms of infection  - Monitor lab/diagnostic results  - Monitor all insertion sites, i e  indwelling lines, tubes, and drains  - [place] appropriate cooling/warming therapies per order  - Administer medications as ordered  - Instruct and encourage patient and family to use good hand hygiene technique  - Identify and instruct in appropriate isolation precautions for identified infection/condition  Outcome: Progressing  Goal: Absence of fever/infection during neutropenic period  Description: INTERVENTIONS:  - Monitor WBC    Outcome: Progressing     Problem: SAFETY ADULT  Goal: Maintain or return to baseline ADL function  Description: INTERVENTIONS:  -  Assess patient's ability to carry out ADLs; assess patient's baseline for ADL function and identify physical deficits which impact ability to perform ADLs (bathing, care of mouth/teeth, toileting, grooming, dressing, etc )  - Assess/evaluate cause of self-care deficits   - Assess range of motion  - Assess patient's mobility; develop plan if impaired  - Assess patient's need for assistive devices and provide as appropriate  - Encourage maximum independence but intervene and supervise when necessary  - Involve family in performance of ADLs  - Assess for home care needs following discharge   - Consider OT consult to assist with ADL evaluation and planning for discharge  - Provide patient education as appropriate  Outcome: Progressing  Goal: Maintains/Returns to pre admission functional level  Description: INTERVENTIONS:  - Perform BMAT or MOVE assessment daily    - Set and communicate daily mobility goal to care team and patient/family/caregiver     - Collaborate with rehabilitation services on mobility goals if consulted  - Out of bed for toileting  - Record patient progress and toleration of activity level   Outcome: Progressing  Goal: Patient will remain free of falls  Description: INTERVENTIONS:  - Educate patient/family on patient safety including physical limitations  - Instruct patient to call for assistance with activity   - Consult OT/PT to assist with strengthening/mobility   - Keep Call bell within reach  - Keep bed low and locked with side rails adjusted as appropriate  - Keep care items and personal belongings within reach  - Initiate and maintain comfort rounds  - Make Fall Risk Sign visible to staff  - Offer Toileting every 2 Hours, in advance of need  - Initiate/Maintain bed alarm  - Obtain necessary fall risk management equipment  - Apply yellow socks and bracelet for high fall risk patients  - Consider moving patient to room near nurses station  Outcome: Progressing     Problem: DISCHARGE PLANNING  Goal: Discharge to home or other facility with appropriate resources  Description: INTERVENTIONS:  - Identify barriers to discharge w/patient and caregiver  - Arrange for needed discharge resources and transportation as appropriate  - Identify discharge learning needs (meds, wound care, etc )  - Arrange for interpretive services to assist at discharge as needed  - Refer to Case Management Department for coordinating discharge planning if the patient needs post-hospital services based on physician/advanced practitioner order or complex needs related to functional status, cognitive ability, or social support system  Outcome: Progressing     Problem: Knowledge Deficit  Goal: Patient/family/caregiver demonstrates understanding of disease process, treatment plan, medications, and discharge instructions  Description: Complete learning assessment and assess knowledge base    Interventions:  - Provide teaching at level of understanding  - Provide teaching via preferred learning methods  Outcome: Progressing     Problem: Prexisting or High Potential for Compromised Skin Integrity  Goal: Skin integrity is maintained or improved  Description: INTERVENTIONS:  - Identify patients at risk for skin breakdown  - Assess and monitor skin integrity  - Assess and monitor nutrition and hydration status  - Monitor labs   - Assess for incontinence   - Turn and reposition patient  - Assist with mobility/ambulation  - Relieve pressure over bony prominences  - Avoid friction and shearing  - Provide appropriate hygiene as needed including keeping skin clean and dry  - Evaluate need for skin moisturizer/barrier cream  - Collaborate with interdisciplinary team   - Patient/family teaching  - Consider wound care consult   Outcome: Progressing     Problem: MUSCULOSKELETAL - ADULT  Goal: Maintain or return mobility to safest level of function  Description: INTERVENTIONS:  - Assess patient's ability to carry out ADLs; assess patient's baseline for ADL function and identify physical deficits which impact ability to perform ADLs (bathing, care of mouth/teeth, toileting, grooming, dressing, etc )  - Assess/evaluate cause of self-care deficits   - Assess range of motion  - Assess patient's mobility  - Assess patient's need for assistive devices and provide as appropriate  - Encourage maximum independence but intervene and supervise when necessary  - Involve family in performance of ADLs  - Assess for home care needs following discharge   - Consider OT consult to assist with ADL evaluation and planning for discharge  - Provide patient education as appropriate  Outcome: Progressing  Goal: Maintain proper alignment of affected body part  Description: INTERVENTIONS:  - Support, maintain and protect limb and body alignment  - Provide patient/ family with appropriate education  Outcome: Progressing

## 2022-02-25 ENCOUNTER — APPOINTMENT (INPATIENT)
Dept: RADIOLOGY | Facility: HOSPITAL | Age: 27
DRG: 218 | End: 2022-02-25
Payer: OTHER MISCELLANEOUS

## 2022-02-25 ENCOUNTER — APPOINTMENT (INPATIENT)
Dept: RADIOLOGY | Facility: HOSPITAL | Age: 27
DRG: 218 | End: 2022-02-25
Attending: ANESTHESIOLOGY
Payer: OTHER MISCELLANEOUS

## 2022-02-25 ENCOUNTER — ANESTHESIA (INPATIENT)
Dept: PERIOP | Facility: HOSPITAL | Age: 27
DRG: 218 | End: 2022-02-25
Payer: OTHER MISCELLANEOUS

## 2022-02-25 ENCOUNTER — ANESTHESIA EVENT (INPATIENT)
Dept: PERIOP | Facility: HOSPITAL | Age: 27
DRG: 218 | End: 2022-02-25
Payer: OTHER MISCELLANEOUS

## 2022-02-25 PROBLEM — R57.0 CARDIOGENIC SHOCK (HCC): Status: ACTIVE | Noted: 2022-02-25

## 2022-02-25 LAB
ANION GAP SERPL CALCULATED.3IONS-SCNC: 3 MMOL/L (ref 4–13)
ANION GAP SERPL CALCULATED.3IONS-SCNC: 4 MMOL/L (ref 4–13)
APTT PPP: 32 SECONDS (ref 23–37)
ARTERIAL PATENCY WRIST A: YES
BASE EXCESS BLDA CALC-SCNC: -3 MMOL/L (ref -2–3)
BASE EXCESS BLDA CALC-SCNC: 0.4 MMOL/L
BASOPHILS # BLD AUTO: 0.05 THOUSANDS/ΜL (ref 0–0.1)
BASOPHILS NFR BLD AUTO: 1 % (ref 0–1)
BODY TEMPERATURE: 99.1 DEGREES FEHRENHEIT
BUN SERPL-MCNC: 11 MG/DL (ref 5–25)
BUN SERPL-MCNC: 14 MG/DL (ref 5–25)
CA-I BLD-SCNC: 1.18 MMOL/L (ref 1.12–1.32)
CALCIUM SERPL-MCNC: 8.3 MG/DL (ref 8.3–10.1)
CALCIUM SERPL-MCNC: 8.6 MG/DL (ref 8.3–10.1)
CHLORIDE SERPL-SCNC: 108 MMOL/L (ref 100–108)
CHLORIDE SERPL-SCNC: 108 MMOL/L (ref 100–108)
CO2 SERPL-SCNC: 27 MMOL/L (ref 21–32)
CO2 SERPL-SCNC: 28 MMOL/L (ref 21–32)
CREAT SERPL-MCNC: 1.06 MG/DL (ref 0.6–1.3)
CREAT SERPL-MCNC: 1.32 MG/DL (ref 0.6–1.3)
EOSINOPHIL # BLD AUTO: 0.26 THOUSAND/ΜL (ref 0–0.61)
EOSINOPHIL NFR BLD AUTO: 3 % (ref 0–6)
ERYTHROCYTE [DISTWIDTH] IN BLOOD BY AUTOMATED COUNT: 13.5 % (ref 11.6–15.1)
ERYTHROCYTE [DISTWIDTH] IN BLOOD BY AUTOMATED COUNT: 13.7 % (ref 11.6–15.1)
FLUAV RNA RESP QL NAA+PROBE: NEGATIVE
FLUBV RNA RESP QL NAA+PROBE: NEGATIVE
GFR SERPL CREATININE-BSD FRML MDRD: 73 ML/MIN/1.73SQ M
GFR SERPL CREATININE-BSD FRML MDRD: 95 ML/MIN/1.73SQ M
GLUCOSE SERPL-MCNC: 107 MG/DL (ref 65–140)
GLUCOSE SERPL-MCNC: 118 MG/DL (ref 65–140)
GLUCOSE SERPL-MCNC: 136 MG/DL (ref 65–140)
GLUCOSE SERPL-MCNC: 162 MG/DL (ref 65–140)
GLUCOSE SERPL-MCNC: 180 MG/DL (ref 65–140)
GLUCOSE SERPL-MCNC: 195 MG/DL (ref 65–140)
GLUCOSE SERPL-MCNC: 204 MG/DL (ref 65–140)
HCO3 BLDA-SCNC: 26.9 MMOL/L (ref 22–28)
HCO3 BLDA-SCNC: 27 MMOL/L (ref 22–28)
HCT VFR BLD AUTO: 40.8 % (ref 36.5–49.3)
HCT VFR BLD AUTO: 41.2 % (ref 36.5–49.3)
HCT VFR BLD CALC: 40 % (ref 36.5–49.3)
HGB BLD-MCNC: 12.5 G/DL (ref 12–17)
HGB BLD-MCNC: 12.7 G/DL (ref 12–17)
HGB BLDA-MCNC: 13.6 G/DL (ref 12–17)
HOROWITZ INDEX BLDA+IHG-RTO: 40 MM[HG]
IMM GRANULOCYTES # BLD AUTO: 0.02 THOUSAND/UL (ref 0–0.2)
IMM GRANULOCYTES NFR BLD AUTO: 0 % (ref 0–2)
INR PPP: 1.05 (ref 0.84–1.19)
LACTATE SERPL-SCNC: 1.6 MMOL/L (ref 0.5–2)
LYMPHOCYTES # BLD AUTO: 1.53 THOUSANDS/ΜL (ref 0.6–4.47)
LYMPHOCYTES NFR BLD AUTO: 20 % (ref 14–44)
MAGNESIUM SERPL-MCNC: 2.3 MG/DL (ref 1.6–2.6)
MAGNESIUM SERPL-MCNC: 2.6 MG/DL (ref 1.6–2.6)
MCH RBC QN AUTO: 27.7 PG (ref 26.8–34.3)
MCH RBC QN AUTO: 27.8 PG (ref 26.8–34.3)
MCHC RBC AUTO-ENTMCNC: 30.6 G/DL (ref 31.4–37.4)
MCHC RBC AUTO-ENTMCNC: 30.8 G/DL (ref 31.4–37.4)
MCV RBC AUTO: 90 FL (ref 82–98)
MCV RBC AUTO: 90 FL (ref 82–98)
MONOCYTES # BLD AUTO: 0.88 THOUSAND/ΜL (ref 0.17–1.22)
MONOCYTES NFR BLD AUTO: 11 % (ref 4–12)
NEUTROPHILS # BLD AUTO: 5.07 THOUSANDS/ΜL (ref 1.85–7.62)
NEUTS SEG NFR BLD AUTO: 65 % (ref 43–75)
NRBC BLD AUTO-RTO: 0 /100 WBCS
O2 CT BLDA-SCNC: 18.3 ML/DL (ref 16–23)
OXYHGB MFR BLDA: 94.8 % (ref 94–97)
PCO2 BLD: 29 MMOL/L (ref 21–32)
PCO2 BLD: 74.6 MM HG (ref 36–44)
PCO2 BLDA: 50.8 MM HG (ref 36–44)
PEEP RESPIRATORY: 6 CM[H2O]
PH BLD: 7.17 [PH] (ref 7.35–7.45)
PH BLDA: 7.34 [PH] (ref 7.35–7.45)
PHOSPHATE SERPL-MCNC: 2.6 MG/DL (ref 2.7–4.5)
PHOSPHATE SERPL-MCNC: 3.7 MG/DL (ref 2.7–4.5)
PLATELET # BLD AUTO: 254 THOUSANDS/UL (ref 149–390)
PLATELET # BLD AUTO: 261 THOUSANDS/UL (ref 149–390)
PMV BLD AUTO: 10.2 FL (ref 8.9–12.7)
PMV BLD AUTO: 11.2 FL (ref 8.9–12.7)
PO2 BLD: 80 MM HG (ref 75–129)
PO2 BLDA: 78.2 MM HG (ref 75–129)
POTASSIUM BLD-SCNC: 4.5 MMOL/L (ref 3.5–5.3)
POTASSIUM SERPL-SCNC: 4.4 MMOL/L (ref 3.5–5.3)
POTASSIUM SERPL-SCNC: 4.6 MMOL/L (ref 3.5–5.3)
PROTHROMBIN TIME: 13.3 SECONDS (ref 11.6–14.5)
RBC # BLD AUTO: 4.52 MILLION/UL (ref 3.88–5.62)
RBC # BLD AUTO: 4.57 MILLION/UL (ref 3.88–5.62)
RSV RNA RESP QL NAA+PROBE: NEGATIVE
SAO2 % BLD FROM PO2: 91 % (ref 60–85)
SARS-COV-2 RNA RESP QL NAA+PROBE: NEGATIVE
SODIUM BLD-SCNC: 140 MMOL/L (ref 136–145)
SODIUM SERPL-SCNC: 138 MMOL/L (ref 136–145)
SODIUM SERPL-SCNC: 140 MMOL/L (ref 136–145)
SPECIMEN SOURCE: ABNORMAL
SPECIMEN SOURCE: ABNORMAL
VENT AC: 15
VENT- AC: AC
VT SETTING VENT: 500 ML
WBC # BLD AUTO: 11.85 THOUSAND/UL (ref 4.31–10.16)
WBC # BLD AUTO: 7.81 THOUSAND/UL (ref 4.31–10.16)

## 2022-02-25 PROCEDURE — C1713 ANCHOR/SCREW BN/BN,TIS/BN: HCPCS | Performed by: ORTHOPAEDIC SURGERY

## 2022-02-25 PROCEDURE — 94002 VENT MGMT INPAT INIT DAY: CPT

## 2022-02-25 PROCEDURE — 0241U HB NFCT DS VIR RESP RNA 4 TRGT: CPT | Performed by: NURSE PRACTITIONER

## 2022-02-25 PROCEDURE — 99232 SBSQ HOSP IP/OBS MODERATE 35: CPT | Performed by: INTERNAL MEDICINE

## 2022-02-25 PROCEDURE — 20690 APPL UNIPLN UNI EXT FIXJ SYS: CPT | Performed by: ORTHOPAEDIC SURGERY

## 2022-02-25 PROCEDURE — 94760 N-INVAS EAR/PLS OXIMETRY 1: CPT

## 2022-02-25 PROCEDURE — 83735 ASSAY OF MAGNESIUM: CPT | Performed by: PHYSICIAN ASSISTANT

## 2022-02-25 PROCEDURE — 84100 ASSAY OF PHOSPHORUS: CPT | Performed by: PHYSICIAN ASSISTANT

## 2022-02-25 PROCEDURE — 85610 PROTHROMBIN TIME: CPT

## 2022-02-25 PROCEDURE — 85025 COMPLETE CBC W/AUTO DIFF WBC: CPT | Performed by: PHYSICIAN ASSISTANT

## 2022-02-25 PROCEDURE — 85014 HEMATOCRIT: CPT

## 2022-02-25 PROCEDURE — 80048 BASIC METABOLIC PNL TOTAL CA: CPT | Performed by: PHYSICIAN ASSISTANT

## 2022-02-25 PROCEDURE — 71045 X-RAY EXAM CHEST 1 VIEW: CPT

## 2022-02-25 PROCEDURE — 99233 SBSQ HOSP IP/OBS HIGH 50: CPT | Performed by: STUDENT IN AN ORGANIZED HEALTH CARE EDUCATION/TRAINING PROGRAM

## 2022-02-25 PROCEDURE — 73590 X-RAY EXAM OF LOWER LEG: CPT

## 2022-02-25 PROCEDURE — 82330 ASSAY OF CALCIUM: CPT

## 2022-02-25 PROCEDURE — 85730 THROMBOPLASTIN TIME PARTIAL: CPT

## 2022-02-25 PROCEDURE — 82947 ASSAY GLUCOSE BLOOD QUANT: CPT

## 2022-02-25 PROCEDURE — NC001 PR NO CHARGE: Performed by: ORTHOPAEDIC SURGERY

## 2022-02-25 PROCEDURE — 82948 REAGENT STRIP/BLOOD GLUCOSE: CPT

## 2022-02-25 PROCEDURE — 82805 BLOOD GASES W/O2 SATURATION: CPT | Performed by: PHYSICIAN ASSISTANT

## 2022-02-25 PROCEDURE — 0QSH3BZ REPOSITION LEFT TIBIA WITH MONOPLANAR EXTERNAL FIXATION DEVICE, PERCUTANEOUS APPROACH: ICD-10-PCS | Performed by: ORTHOPAEDIC SURGERY

## 2022-02-25 PROCEDURE — 84132 ASSAY OF SERUM POTASSIUM: CPT

## 2022-02-25 PROCEDURE — 83605 ASSAY OF LACTIC ACID: CPT | Performed by: PHYSICIAN ASSISTANT

## 2022-02-25 PROCEDURE — 82803 BLOOD GASES ANY COMBINATION: CPT

## 2022-02-25 PROCEDURE — 84295 ASSAY OF SERUM SODIUM: CPT

## 2022-02-25 PROCEDURE — 85027 COMPLETE CBC AUTOMATED: CPT | Performed by: PHYSICIAN ASSISTANT

## 2022-02-25 PROCEDURE — 99223 1ST HOSP IP/OBS HIGH 75: CPT | Performed by: EMERGENCY MEDICINE

## 2022-02-25 DEVICE — CLAMP EXFIX LRG 6 POS PIN MRI SAFE LRG SYNTHES 390.010
Type: IMPLANTABLE DEVICE | Site: TIBIA | Status: NON-FUNCTIONAL
Removed: 2022-03-03

## 2022-02-25 DEVICE — ROD CARBON FIBER 11MM X 250MM
Type: IMPLANTABLE DEVICE | Site: TIBIA | Status: NON-FUNCTIONAL
Removed: 2022-03-03

## 2022-02-25 DEVICE — ROD CARBON FIBER 11MM X 450MM
Type: IMPLANTABLE DEVICE | Site: TIBIA | Status: NON-FUNCTIONAL
Removed: 2022-03-03

## 2022-02-25 DEVICE — CLAMP EXFIX LRG COMBINATION MRI SAFE LRG
Type: IMPLANTABLE DEVICE | Site: TIBIA | Status: NON-FUNCTIONAL
Removed: 2022-03-03

## 2022-02-25 DEVICE — ROD CARBON FIBER 11MM X 400MM
Type: IMPLANTABLE DEVICE | Site: TIBIA | Status: NON-FUNCTIONAL
Removed: 2022-03-03

## 2022-02-25 DEVICE — 6.0MM TRANSFIXATION PIN 225MM
Type: IMPLANTABLE DEVICE | Site: TIBIA | Status: NON-FUNCTIONAL
Removed: 2022-03-03

## 2022-02-25 DEVICE — OUTRIGGER POST 11MM 30DEG
Type: IMPLANTABLE DEVICE | Site: TIBIA | Status: NON-FUNCTIONAL
Removed: 2022-03-03

## 2022-02-25 DEVICE — ROD CARBON FIBER 11MM X 150MM
Type: IMPLANTABLE DEVICE | Site: TIBIA | Status: NON-FUNCTIONAL
Removed: 2022-03-03

## 2022-02-25 DEVICE — 5.0MM SELF-DRILLING SCHANZ SCREW 60MM THRD/175MM
Type: IMPLANTABLE DEVICE | Site: TIBIA | Status: NON-FUNCTIONAL
Removed: 2022-03-03

## 2022-02-25 DEVICE — 4.0MM SELF-DRILLING SCHANZ SCREW 40MM THRD/150MM
Type: IMPLANTABLE DEVICE | Site: TIBIA | Status: NON-FUNCTIONAL
Removed: 2022-03-03

## 2022-02-25 RX ORDER — DEXMEDETOMIDINE HYDROCHLORIDE 100 UG/ML
INJECTION, SOLUTION INTRAVENOUS AS NEEDED
Status: DISCONTINUED | OUTPATIENT
Start: 2022-02-25 | End: 2022-02-25

## 2022-02-25 RX ORDER — SODIUM CHLORIDE 9 MG/ML
INJECTION, SOLUTION INTRAVENOUS CONTINUOUS PRN
Status: DISCONTINUED | OUTPATIENT
Start: 2022-02-25 | End: 2022-02-25

## 2022-02-25 RX ORDER — FENTANYL CITRATE 50 UG/ML
50 INJECTION, SOLUTION INTRAMUSCULAR; INTRAVENOUS
Status: DISCONTINUED | OUTPATIENT
Start: 2022-02-25 | End: 2022-02-25

## 2022-02-25 RX ORDER — ALBUMIN, HUMAN INJ 5% 5 %
SOLUTION INTRAVENOUS CONTINUOUS PRN
Status: DISCONTINUED | OUTPATIENT
Start: 2022-02-25 | End: 2022-02-25

## 2022-02-25 RX ORDER — KETAMINE HYDROCHLORIDE 50 MG/ML
INJECTION, SOLUTION, CONCENTRATE INTRAMUSCULAR; INTRAVENOUS AS NEEDED
Status: DISCONTINUED | OUTPATIENT
Start: 2022-02-25 | End: 2022-02-25

## 2022-02-25 RX ORDER — MAGNESIUM HYDROXIDE 1200 MG/15ML
LIQUID ORAL AS NEEDED
Status: DISCONTINUED | OUTPATIENT
Start: 2022-02-25 | End: 2022-02-25 | Stop reason: HOSPADM

## 2022-02-25 RX ORDER — ONDANSETRON 2 MG/ML
4 INJECTION INTRAMUSCULAR; INTRAVENOUS ONCE
Status: COMPLETED | OUTPATIENT
Start: 2022-02-25 | End: 2022-02-25

## 2022-02-25 RX ORDER — LIDOCAINE HYDROCHLORIDE 10 MG/ML
INJECTION, SOLUTION EPIDURAL; INFILTRATION; INTRACAUDAL; PERINEURAL AS NEEDED
Status: DISCONTINUED | OUTPATIENT
Start: 2022-02-25 | End: 2022-02-25

## 2022-02-25 RX ORDER — CARVEDILOL 25 MG/1
25 TABLET ORAL 2 TIMES DAILY WITH MEALS
Status: DISCONTINUED | OUTPATIENT
Start: 2022-02-25 | End: 2022-03-05 | Stop reason: HOSPADM

## 2022-02-25 RX ORDER — ONDANSETRON 2 MG/ML
INJECTION INTRAMUSCULAR; INTRAVENOUS
Status: COMPLETED
Start: 2022-02-25 | End: 2022-02-25

## 2022-02-25 RX ORDER — FENTANYL CITRATE 50 UG/ML
INJECTION, SOLUTION INTRAMUSCULAR; INTRAVENOUS AS NEEDED
Status: DISCONTINUED | OUTPATIENT
Start: 2022-02-25 | End: 2022-02-25

## 2022-02-25 RX ORDER — CEFAZOLIN SODIUM 2 G/50ML
2000 SOLUTION INTRAVENOUS
Status: DISCONTINUED | OUTPATIENT
Start: 2022-02-26 | End: 2022-02-25

## 2022-02-25 RX ORDER — SODIUM CHLORIDE, SODIUM LACTATE, POTASSIUM CHLORIDE, CALCIUM CHLORIDE 600; 310; 30; 20 MG/100ML; MG/100ML; MG/100ML; MG/100ML
INJECTION, SOLUTION INTRAVENOUS CONTINUOUS PRN
Status: DISCONTINUED | OUTPATIENT
Start: 2022-02-25 | End: 2022-02-25

## 2022-02-25 RX ORDER — MIDAZOLAM HYDROCHLORIDE 2 MG/2ML
2 INJECTION, SOLUTION INTRAMUSCULAR; INTRAVENOUS ONCE
Status: DISCONTINUED | OUTPATIENT
Start: 2022-02-25 | End: 2022-02-25

## 2022-02-25 RX ORDER — CEFAZOLIN SODIUM 2 G/50ML
2000 SOLUTION INTRAVENOUS EVERY 8 HOURS
Status: COMPLETED | OUTPATIENT
Start: 2022-02-25 | End: 2022-02-26

## 2022-02-25 RX ORDER — CHLORHEXIDINE GLUCONATE 0.12 MG/ML
15 RINSE ORAL EVERY 12 HOURS SCHEDULED
Status: DISCONTINUED | OUTPATIENT
Start: 2022-02-25 | End: 2022-02-26

## 2022-02-25 RX ORDER — ROCURONIUM BROMIDE 10 MG/ML
INJECTION, SOLUTION INTRAVENOUS AS NEEDED
Status: DISCONTINUED | OUTPATIENT
Start: 2022-02-25 | End: 2022-02-25

## 2022-02-25 RX ORDER — GLYCOPYRROLATE 0.2 MG/ML
INJECTION INTRAMUSCULAR; INTRAVENOUS AS NEEDED
Status: DISCONTINUED | OUTPATIENT
Start: 2022-02-25 | End: 2022-02-25

## 2022-02-25 RX ORDER — DEXAMETHASONE SODIUM PHOSPHATE 10 MG/ML
INJECTION, SOLUTION INTRAMUSCULAR; INTRAVENOUS AS NEEDED
Status: DISCONTINUED | OUTPATIENT
Start: 2022-02-25 | End: 2022-02-25

## 2022-02-25 RX ORDER — MIDAZOLAM HYDROCHLORIDE 2 MG/2ML
2 INJECTION, SOLUTION INTRAMUSCULAR; INTRAVENOUS AS NEEDED
Status: DISCONTINUED | OUTPATIENT
Start: 2022-02-25 | End: 2022-02-25

## 2022-02-25 RX ORDER — MIDAZOLAM HYDROCHLORIDE 2 MG/2ML
INJECTION, SOLUTION INTRAMUSCULAR; INTRAVENOUS
Status: DISCONTINUED
Start: 2022-02-25 | End: 2022-02-25 | Stop reason: WASHOUT

## 2022-02-25 RX ORDER — FUROSEMIDE 10 MG/ML
INJECTION INTRAMUSCULAR; INTRAVENOUS AS NEEDED
Status: DISCONTINUED | OUTPATIENT
Start: 2022-02-25 | End: 2022-02-25

## 2022-02-25 RX ORDER — ETOMIDATE 2 MG/ML
INJECTION INTRAVENOUS AS NEEDED
Status: DISCONTINUED | OUTPATIENT
Start: 2022-02-25 | End: 2022-02-25

## 2022-02-25 RX ORDER — FENTANYL CITRATE/PF 50 MCG/ML
25 SYRINGE (ML) INJECTION
Status: DISCONTINUED | OUTPATIENT
Start: 2022-02-25 | End: 2022-02-25 | Stop reason: HOSPADM

## 2022-02-25 RX ORDER — ONDANSETRON 2 MG/ML
4 INJECTION INTRAMUSCULAR; INTRAVENOUS ONCE AS NEEDED
Status: DISCONTINUED | OUTPATIENT
Start: 2022-02-25 | End: 2022-02-25 | Stop reason: HOSPADM

## 2022-02-25 RX ORDER — MIDAZOLAM HYDROCHLORIDE 2 MG/2ML
INJECTION, SOLUTION INTRAMUSCULAR; INTRAVENOUS AS NEEDED
Status: DISCONTINUED | OUTPATIENT
Start: 2022-02-25 | End: 2022-02-25

## 2022-02-25 RX ORDER — CEFAZOLIN SODIUM 2 G/50ML
2000 SOLUTION INTRAVENOUS
Status: DISCONTINUED | OUTPATIENT
Start: 2022-02-25 | End: 2022-02-25 | Stop reason: HOSPADM

## 2022-02-25 RX ORDER — LIDOCAINE HYDROCHLORIDE 10 MG/ML
INJECTION, SOLUTION EPIDURAL; INFILTRATION; INTRACAUDAL; PERINEURAL
Status: COMPLETED | OUTPATIENT
Start: 2022-02-25 | End: 2022-02-25

## 2022-02-25 RX ADMIN — ONDANSETRON 4 MG: 2 INJECTION INTRAMUSCULAR; INTRAVENOUS at 14:49

## 2022-02-25 RX ADMIN — MIDAZOLAM 2 MG: 1 INJECTION INTRAMUSCULAR; INTRAVENOUS at 08:16

## 2022-02-25 RX ADMIN — FUROSEMIDE 40 MG: 10 INJECTION, SOLUTION INTRAVENOUS at 09:57

## 2022-02-25 RX ADMIN — CEFAZOLIN SODIUM 2000 MG: 2 SOLUTION INTRAVENOUS at 17:59

## 2022-02-25 RX ADMIN — CHLORHEXIDINE GLUCONATE 15 ML: 1.2 SOLUTION ORAL at 13:05

## 2022-02-25 RX ADMIN — KETAMINE HYDROCHLORIDE 30 MG: 50 INJECTION, SOLUTION INTRAMUSCULAR; INTRAVENOUS at 09:15

## 2022-02-25 RX ADMIN — LIDOCAINE HYDROCHLORIDE 3 ML: 10 INJECTION, SOLUTION EPIDURAL; INFILTRATION; INTRACAUDAL; PERINEURAL at 09:41

## 2022-02-25 RX ADMIN — POTASSIUM & SODIUM PHOSPHATES POWDER PACK 280-160-250 MG 2 PACKET: 280-160-250 PACK at 17:14

## 2022-02-25 RX ADMIN — OXYCODONE HYDROCHLORIDE 10 MG: 10 TABLET ORAL at 00:19

## 2022-02-25 RX ADMIN — Medication 3000 MG: at 08:53

## 2022-02-25 RX ADMIN — DEXMEDETOMIDINE HCL 20 MCG: 100 INJECTION INTRAVENOUS at 10:47

## 2022-02-25 RX ADMIN — ENOXAPARIN SODIUM 60 MG: 60 INJECTION SUBCUTANEOUS at 21:55

## 2022-02-25 RX ADMIN — CARVEDILOL 25 MG: 25 TABLET, FILM COATED ORAL at 17:14

## 2022-02-25 RX ADMIN — NOREPINEPHRINE BITARTRATE 5 MCG/MIN: 1 INJECTION, SOLUTION, CONCENTRATE INTRAVENOUS at 08:46

## 2022-02-25 RX ADMIN — NOREPINEPHRINE BITARTRATE 2 MCG/MIN: 1 INJECTION, SOLUTION, CONCENTRATE INTRAVENOUS at 11:58

## 2022-02-25 RX ADMIN — SUGAMMADEX 200 MG: 100 INJECTION, SOLUTION INTRAVENOUS at 10:20

## 2022-02-25 RX ADMIN — ROCURONIUM BROMIDE 100 MG: 50 INJECTION, SOLUTION INTRAVENOUS at 08:47

## 2022-02-25 RX ADMIN — EPINEPHRINE 4 MCG/MIN: 1 INJECTION, SOLUTION, CONCENTRATE INTRAVENOUS at 11:58

## 2022-02-25 RX ADMIN — DEXMEDETOMIDINE HCL 20 MCG: 100 INJECTION INTRAVENOUS at 10:27

## 2022-02-25 RX ADMIN — ACETAMINOPHEN 975 MG: 325 TABLET ORAL at 21:55

## 2022-02-25 RX ADMIN — DEXAMETHASONE SODIUM PHOSPHATE 10 MG: 10 INJECTION, SOLUTION INTRAMUSCULAR; INTRAVENOUS at 08:50

## 2022-02-25 RX ADMIN — SODIUM CHLORIDE, SODIUM LACTATE, POTASSIUM CHLORIDE, AND CALCIUM CHLORIDE: .6; .31; .03; .02 INJECTION, SOLUTION INTRAVENOUS at 08:05

## 2022-02-25 RX ADMIN — ALBUMIN (HUMAN): 12.5 INJECTION, SOLUTION INTRAVENOUS at 09:11

## 2022-02-25 RX ADMIN — Medication 4 MCG/MIN: at 08:44

## 2022-02-25 RX ADMIN — ACETAMINOPHEN 975 MG: 325 TABLET ORAL at 05:32

## 2022-02-25 RX ADMIN — CHLORHEXIDINE GLUCONATE 15 ML: 1.2 SOLUTION ORAL at 20:53

## 2022-02-25 RX ADMIN — SODIUM CHLORIDE: 0.9 INJECTION, SOLUTION INTRAVENOUS at 08:50

## 2022-02-25 RX ADMIN — DOCUSATE SODIUM 100 MG: 100 CAPSULE ORAL at 17:14

## 2022-02-25 RX ADMIN — INSULIN LISPRO 2 UNITS: 100 INJECTION, SOLUTION INTRAVENOUS; SUBCUTANEOUS at 00:22

## 2022-02-25 RX ADMIN — DEXMEDETOMIDINE HYDROCHLORIDE 0.4 MCG/KG/HR: 100 INJECTION, SOLUTION INTRAVENOUS at 11:58

## 2022-02-25 RX ADMIN — FENTANYL CITRATE 25 MCG: 50 INJECTION INTRAMUSCULAR; INTRAVENOUS at 08:34

## 2022-02-25 RX ADMIN — FENTANYL CITRATE 75 MCG: 50 INJECTION INTRAMUSCULAR; INTRAVENOUS at 08:46

## 2022-02-25 RX ADMIN — CARVEDILOL 25 MG: 25 TABLET, FILM COATED ORAL at 07:15

## 2022-02-25 RX ADMIN — FENTANYL CITRATE 25 MCG: 50 INJECTION INTRAMUSCULAR; INTRAVENOUS at 12:28

## 2022-02-25 RX ADMIN — MIDAZOLAM 2 MG: 1 INJECTION INTRAMUSCULAR; INTRAVENOUS at 08:28

## 2022-02-25 RX ADMIN — ETOMIDATE 30 MG: 20 INJECTION, SOLUTION INTRAVENOUS at 08:47

## 2022-02-25 RX ADMIN — OXYCODONE HYDROCHLORIDE 10 MG: 10 TABLET ORAL at 05:31

## 2022-02-25 RX ADMIN — GLYCOPYRROLATE 0.2 MG: 0.2 INJECTION, SOLUTION INTRAMUSCULAR; INTRAVENOUS at 09:30

## 2022-02-25 NOTE — ANESTHESIA POSTPROCEDURE EVALUATION
Post-Op Assessment Note    CV Status:  Stable  Pain Score: 0    Pain management: adequate     Post-procedure mental status: sedated and intubated  Hydration Status:  Stable   PONV Controlled:  Controlled   Airway Patency:  Patent  Airway: intubated   Two or more mitigation strategies used for obstructive sleep apnea  There is a medical reason for not screening for obstructive sleep apnea and/or for not using two or more mitigation strategies (pt sedated at this [id number])   Post Op Vitals Reviewed: Yes      Staff: CRNA         No complications documented  BP   156/85   Temp 98 5   Pulse 101   Resp 18   SpO2 100   Pt transported to PACU with epi and norepi infusions per anesthesia flow sheet  both infusions and precedex continued in PACU  Pt continues to remain intubated and sedated   Care relinguished
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
## 2022-02-25 NOTE — ANESTHESIA PROCEDURE NOTES
Arterial Line Insertion  Performed by: Katherine Godfrey CRNA  Authorized by: Elodia Robb MD   Consent: Written consent obtained  Risks and benefits: risks, benefits and alternatives were discussed  Consent given by: patient  Patient understanding: patient states understanding of the procedure being performed  Patient consent: the patient's understanding of the procedure matches consent given  Procedure consent: procedure consent matches procedure scheduled  Relevant documents: relevant documents present and verified  Required items: required blood products, implants, devices, and special equipment available  Patient identity confirmed: arm band  Preparation: Patient was prepped and draped in the usual sterile fashion  Indications: hemodynamic monitoring  Orientation:  Right  Location: radial arterylidocaine (PF) (XYLOCAINE-MPF) 1 % infiltration, 3 mL  Sedation:  Patient sedated: no    Procedure Details:  Mich's test normal: yes  Needle gauge: 20  Number of attempts: 1    Post-procedure:  Post-procedure: dressing applied  Waveform: good waveform  Post-procedure CNS: unchanged  Patient tolerance: Patient tolerated the procedure well with no immediate complications  Comments:  Inserted by Angela Andujar CRNA

## 2022-02-25 NOTE — RESPIRATORY THERAPY NOTE
RT Ventilator Management Note  Donne Los 31 y o  male MRN: [id number]  Unit/Bed#: ICU 02 Encounter: [id number]      Daily Screen       2/25/2022  1124             Patient safety screen outcome[de-identified] Failed    Not Ready for Weaning due to[de-identified] Underline problem not resolved            Physical Exam:   Assessment Type: Assess only  General Appearance: Sedated  Respiratory Pattern: Assisted  Chest Assessment: Chest expansion symmetrical  Bilateral Breath Sounds: Clear      Resp Comments: (P) pt placed on 840 at this time

## 2022-02-25 NOTE — PHYSICAL THERAPY NOTE
Physical Therapy Cancellation Note      PT orders received and chart reviewed  Pt pending OR with ortho for fixation of L distal tibia fx  Will defer PT eval at this time and continue to follow and evaluate as appropriate post op      Zohra Bhakta, PT, DPT

## 2022-02-25 NOTE — PROGRESS NOTES
Progress Note - Orthopedics   Luigi Coffman 31 y o  male MRN: [id number]  [facility]      Subjective:    31 y o male with left distal tibia fracture  Pain controlled  No further issues overnight       Labs:  0   Lab Value Date/Time    HCT 40 8 02/25/2022 0505    HCT 39 1 02/24/2022 0526    HCT 44 6 02/23/2022 1502    HCT 43 02/23/2022 1501    HGB 12 5 02/25/2022 0505    HGB 12 3 02/24/2022 0526    HGB 13 5 02/23/2022 1502    HGB 14 6 02/23/2022 1501    INR 1 06 02/24/2022 0526    WBC 7 81 02/25/2022 0505    WBC 7 72 02/24/2022 0526    WBC 7 50 02/23/2022 1502       Meds:    Current Facility-Administered Medications:     acetaminophen (TYLENOL) tablet 975 mg, 975 mg, Oral, Q8H [facility], Jaylen Liu MD, 975 mg at 02/25/22 0532    atorvastatin (LIPITOR) tablet 20 mg, 20 mg, Oral, Daily, Jaylen Liu MD, 20 mg at 02/24/22 0827    calcium carbonate (TUMS) chewable tablet 500 mg, 500 mg, Oral, Daily PRN, Tyler Quinones MD, 500 mg at 02/23/22 2328    carvedilol (COREG) tablet 25 mg, 25 mg, Oral, BID With Meals, Jaylen Liu MD, 25 mg at 02/24/22 1659    docusate sodium (COLACE) capsule 100 mg, 100 mg, Oral, BID, Jaylen Liu MD, 100 mg at 02/24/22 1700    enoxaparin (LOVENOX) subcutaneous injection 60 mg, 60 mg, Subcutaneous, Q12H [facility], MITRA Tolbert, 60 mg at 02/24/22 2106    HYDROmorphone (DILAUDID) injection 0 5 mg, 0 5 mg, Intravenous, Q4H PRN, Jaylen Liu MD    insulin lispro (HumaLOG) 100 units/mL subcutaneous injection 2-12 Units, 2-12 Units, Subcutaneous, Q6H [facility], 2 Units at 02/25/22 0022 **AND** Fingerstick Glucose (POCT), , , Q6H, Jaylen Liu MD    oxyCODONE (ROXICODONE) immediate release tablet 10 mg, 10 mg, Oral, Q4H PRN, Jaylen Liu MD, 10 mg at 02/25/22 0531    oxyCODONE (ROXICODONE) IR tablet 5 mg, 5 mg, Oral, Q4H PRN, Jaylen Liu MD    polyethylene glycol (MIRALAX) packet 17 g, 17 g, Oral, Daily, Jessi Pena MD    sacubitril-valsartan (ENTRESTO)  MG per tablet 1 tablet, 1 tablet, Oral, BID, Hutton Phoenix Spironello, CRNP, 1 tablet at 02/24/22 1700    Blood Culture:   No results found for: BLOODCX    Wound Culture:   No results found for: WOUNDCULT    Ins and Outs:  I/O last 24 hours: In: 1876 7 [P O :720; I V :1156 7]  Out: 2450 [Urine:2450]          Physical:  Vitals:    02/25/22 0259   BP: 132/83   Pulse:    Resp: 16   Temp: 98 °F (36 7 °C)   SpO2:      Musculoskeletal: left Lower Extremity  · Splint in place  · TTP at the distal tibia  · Calf soft and compressible   · Sensation intact to light touch jaki/saph/sp/dp/tib to exposed areas  · Motor intact EHL/FHL  · Toes warm and well perfused      Assessment:    26 y o male with left distal tibia fracture that would benefit from operative fixation should patient be able to be optimized from a medical and cardiac standpoint        Plan:  · NWB LLE in splint  · Will monitor for ABLA   · NPO  · OR today if cleared from medical standpoint   · PT/OT post op   · Pain control  · DVT PPX: hold this AM in preparation for surgery  · Dispo: Ortho will follow    Suzette Field MD

## 2022-02-25 NOTE — CASE MANAGEMENT
Case Management Progress Note    Patient name aKthia Dillon  Location OR POOL/OR POOL MRN [id number]  : 1995 Date 2022       LOS (days): 2  Geometric Mean LOS (GMLOS) (days):   Days to GMLOS:        OBJECTIVE:        Current admission status: Inpatient  Preferred Pharmacy:   PATIENT/FAMILY REPORTS NO PREFERRED PHARMACY  No address on file      Primary Care Provider: David Desia MD    Primary Insurance: [employer]  Secondary Insurance: [employer] [place] [employer]    PROGRESS NOTE:    Pt in [address] today with Ortho  CM will follow up post-op for therapy recommendations, when appropriate

## 2022-02-25 NOTE — RESPIRATORY THERAPY NOTE
Resp Care       02/24/22 6345   Respiratory Assessment   Resp Comments Pt says he does not wear Cpap often refusing to wear at this time  Says that he is ok if he wears O2

## 2022-02-25 NOTE — RESPIRATORY THERAPY NOTE
RT Ventilator Management Note  Tamar Hayward 31 y o  male MRN: [id number]  Unit/Bed#: ICU 02 Encounter: [id number]      Daily Screen       2/25/2022  1124             Patient safety screen outcome[de-identified] Failed    Not Ready for Weaning due to[de-identified] Underline problem not resolved            Physical Exam:   Assessment Type: Assess only  General Appearance: Sedated  Respiratory Pattern: Assisted  Chest Assessment: Chest expansion symmetrical  Bilateral Breath Sounds: Clear      Resp Comments: (P) pt suctioned orally and down ett  pt extubated to bipap at this time

## 2022-02-25 NOTE — ANESTHESIA PROCEDURE NOTES
Central Line Insertion  Performed by: Bello Santo MD  Authorized by: Bello Santo MD     Date/Time: 2/25/2022 10:47 AM  Catheter Type:  triple lumen  Consent: The procedure was performed in an emergent situation    Indications: vascular access  Location details: right internal jugular  Catheter size: 7 Fr  Patient position: Trendelenburg  Anesthesia: see MAR for details  Assessment: blood return through all ports  Preparation: skin prepped with 2% chlorhexidine  Skin prep agent dried: skin prep agent completely dried prior to procedure  Sterile barriers: all five maximum sterile barriers used - cap, mask, sterile gown, sterile gloves, and large sterile sheet  Hand hygiene: hand hygiene performed prior to central venous catheter insertion  Ultrasound guidance: yes  sterile gel and probe cover used in ultrasound-guided central venous catheter insertionultrasound permanent image saved  Pre-procedure: landmarks identified  Vessel of Catheter Tip End: SVC  Number of attempts: 2  Successful placement: yes  Post-procedure: line sutured and dressing applied  Patient tolerance: Patient tolerated the procedure well with no immediate complications

## 2022-02-25 NOTE — RESPIRATORY THERAPY NOTE
RT Ventilator Management Note  Leland Sensing 31 y o  male MRN: [id number]  Unit/Bed#: OR POOL Encounter: [id number]      Daily Screen       2/25/2022  1124             Patient safety screen outcome[de-identified] Failed    Not Ready for Weaning due to[de-identified] Underline problem not resolved            Physical Exam:   Assessment Type: (P) Assess only  General Appearance: (P) Sedated  Respiratory Pattern: (P) Assisted  Chest Assessment: (P) Chest expansion symmetrical  Bilateral Breath Sounds: (P) Clear      Resp Comments: (P) Pt arrived intubated in pacu post closed fracture of distal end of tibia  pt placed on 15/500/40/8 of pee   pt 24 at the [facility] per md

## 2022-02-25 NOTE — RESPIRATORY THERAPY NOTE
RT Ventilator Management Note  Tamar Hayward 31 y o  male MRN: [id number]  Unit/Bed#: ICU 02 Encounter: [id number]      Daily Screen       2/25/2022  1124             Patient safety screen outcome[de-identified] Failed    Not Ready for Weaning due to[de-identified] Underline problem not resolved            Physical Exam:   Assessment Type: Assess only  General Appearance: Sedated  Respiratory Pattern: Assisted  Chest Assessment: Chest expansion symmetrical  Bilateral Breath Sounds: Clear      Resp Comments: (P) pt placed on cpa/ps at this time  pt did not tolerate it and had a vagal response  pt  hr went to the 30's  rr 50's  pt placed back on ac/vc settings  PA aware

## 2022-02-25 NOTE — ANESTHESIA POSTPROCEDURE EVALUATION
Post-Op Assessment Note    CV Status:  Unstable  Pain Score: 0    Pain management: adequate     Mental Status:  Unresponsive   Hydration Status:  Stable   PONV Controlled:  None   Airway Patency:  Patent  Airway: intubated      Post Op Vitals Reviewed: Yes      Staff: Anesthesiologist         No complications documented      BP      Temp      Pulse 98 (02/25/22 1245)   Resp 22 (02/25/22 1245)    SpO2 95 % (02/25/22 1245)
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
## 2022-02-25 NOTE — ANESTHESIA PREPROCEDURE EVALUATION
Procedure:  OPEN REDUCTION W/ INTERNAL FIXATION (ORIF) TIBIA (Left Leg Lower)  APPLICATION EXTERNAL FIXATION DEVICE LOWER EXTREMITY (Left Leg Lower)    Relevant Problems   No relevant active problems        Physical Exam    Airway    Mallampati score: IV  TM Distance: <3 FB  Neck ROM: limited     Dental   No notable dental hx     Cardiovascular  Cardiovascular exam normal    Pulmonary  Pulmonary exam normal Decreased breath sounds,     Other Findings        Anesthesia Plan  ASA Score- 5     Anesthesia Type- general with ASA Monitors  Additional Monitors: arterial line  Airway Plan:     Comment: Severe cardiomyopathy with EF of 22%, complicated by morbid obesity, UBALDO and asthma  Patient refused AICD placement offered by his cardiologist  I explained to patient his very high risk of morbidity, mortality, prolonged intubation, need for pressors and resuscitation measures  ICU postop  He understands and is willing to proceed          Plan Factors-Exercise tolerance (METS): <4 METS  Chart reviewed  EKG reviewed  Imaging results reviewed  Existing labs reviewed  Patient summary reviewed  Patient is not a current smoker  Obstructive sleep apnea risk education given perioperatively  Induction- intravenous  Postoperative Plan- Plan for postoperative opioid use  Planned trial extubation    Informed Consent- Anesthetic plan and risks discussed with patient  I personally reviewed this patient with the CRNA  Discussed and agreed on the Anesthesia Plan with the CRNA  Julien Alonzo

## 2022-02-25 NOTE — OP NOTE
OPERATIVE REPORT  PATIENT NAME: Nafisa Dunn    :  1995  MRN: [id number]  Pt Location:  OR ROOM 18    SURGERY DATE: 2022    Surgeon(s) and Role:     * Lucita Godfrey MD - Primary     * Luisito Ambrose MD - Assisting     * Yuan Willingham PA-C - Assisting    Preop Diagnosis:  Left tibia pilon fracture    Post-Op Diagnosis Codes:  Same    Procedure(s) (LRB):  APPLICATION EXTERNAL FIXATION DEVICE LOWER EXTREMITY (Left)    Procedure:  Application uniplanar external fixator LLE (CPT 79249)    Specimen(s):  * No specimens in log *    Estimated Blood Loss:   Minimal    Drains:  * No LDAs found *    Anesthesia Type:   General    Operative Indications:  Displaced, comminuted L tibia pilon fracture    Operative Findings:  See below    Complications:   None    Implants: Schanz pins - 180 mm tibia x2, central threaded 5 0 mm calc pin, 4 0 mm midfoot Schanz pin    Procedure and Technique:  The patient's operative site, laterality, procedure, consent were verified in the preop area and the patient was transitioned to the OR  General anesthesia was provided by the anesthesia team and the operative extremity was prepped and draped in the usual sterile fashion  After a timeout for safety, two stab incisions were made at the tibia and hemostat was used to spread to bone  Two Schanz pins were predrilled with irrigation and placed by hand in usual fashion  Position verified on fluoro imaging  Stab incision was then made at the medial cuneiform and hemostat was used to spread to bone  Predrilling took place and 4 0 mm Schanz pin was placed by hand, stab incision then made at medial calc in appropriate position and central threaded pin was placed into bone  Fracture was then aligned and bars and clamps were placed and tightened  And additional cross bar was placed at the tibial bars to control coronal plane   Additional length was required to control sagittal plane and distractor devices were placed bilaterally to increase length at the fracture as well as gain tibiotalar joint distraction  Alignment was found to be satisfactory and final tightening took place at all clamps  The wounds were cleansed and dressed with adaptic, kerlix bolsters, sterile webrill, ACE wraps  All final counts, including but not limited to instrument, sponge, needle counts were correct  I was present for the entire procedure  The patient was extubated and awakened and transitioned to the PACU in stable condition  There were no immediate complications  The patient will be nonweight bearing on the operative extremity and will have a planned return to OR for formal ORIF and removal of external fixato next week if soft tissues allow  He will require dvt prophylaxis in the meantime      Patient Disposition:  PACU       SIGNATURE: Adam Ballard MD  DATE: February 25, 2022  TIME: 10:36 AM

## 2022-02-25 NOTE — OCCUPATIONAL THERAPY NOTE
Occupational Therapy         Patient Name: Sandrita Mary  QIEGL'V Date: 2/25/2022 02/25/22 0800   OT Last Visit   OT Visit Date 02/25/22   Note Type   Note type Cancelled Session   Cancel Reasons Patient to operating room     Patient currently in [address] [place] [address] - will defer    Neville Angel, OT

## 2022-02-25 NOTE — QUICK NOTE
The patient was extubated to BiPAP  Patient was on BiPAP for approximately 30 minutes  Patient was asking for the mask off and saturating well  Patient was placed on 5L nasal cannula, saturating approximately 95% patient reports his work of breathing feels fine he does not feel short of breath  Patient intermittently tachypneic when sleeping will discontinue Precedex and continue monitor  When patient is fully awake he is not tachypneic no increased work of breathing saturating 95% on NC    Is currently alert, oriented  He is denying any pain he is moving all 4 extremities lingering his toes bilaterally       Will continue monitor and possibly diurese further

## 2022-02-26 PROBLEM — R57.0 CARDIOGENIC SHOCK (HCC): Status: RESOLVED | Noted: 2022-02-25 | Resolved: 2022-02-26

## 2022-02-26 LAB
ANION GAP SERPL CALCULATED.3IONS-SCNC: 6 MMOL/L (ref 4–13)
BUN SERPL-MCNC: 23 MG/DL (ref 5–25)
CALCIUM SERPL-MCNC: 8.8 MG/DL (ref 8.3–10.1)
CHLORIDE SERPL-SCNC: 104 MMOL/L (ref 100–108)
CO2 SERPL-SCNC: 27 MMOL/L (ref 21–32)
CREAT SERPL-MCNC: 1.42 MG/DL (ref 0.6–1.3)
ERYTHROCYTE [DISTWIDTH] IN BLOOD BY AUTOMATED COUNT: 13.4 % (ref 11.6–15.1)
GFR SERPL CREATININE-BSD FRML MDRD: 67 ML/MIN/1.73SQ M
GLUCOSE SERPL-MCNC: 147 MG/DL (ref 65–140)
GLUCOSE SERPL-MCNC: 154 MG/DL (ref 65–140)
GLUCOSE SERPL-MCNC: 160 MG/DL (ref 65–140)
GLUCOSE SERPL-MCNC: 181 MG/DL (ref 65–140)
GLUCOSE SERPL-MCNC: 193 MG/DL (ref 65–140)
GLUCOSE SERPL-MCNC: 223 MG/DL (ref 65–140)
HCT VFR BLD AUTO: 38.1 % (ref 36.5–49.3)
HGB BLD-MCNC: 11.9 G/DL (ref 12–17)
MAGNESIUM SERPL-MCNC: 2.6 MG/DL (ref 1.6–2.6)
MCH RBC QN AUTO: 27.3 PG (ref 26.8–34.3)
MCHC RBC AUTO-ENTMCNC: 31.2 G/DL (ref 31.4–37.4)
MCV RBC AUTO: 87 FL (ref 82–98)
PHOSPHATE SERPL-MCNC: 4.2 MG/DL (ref 2.7–4.5)
PLATELET # BLD AUTO: 248 THOUSANDS/UL (ref 149–390)
PMV BLD AUTO: 11.1 FL (ref 8.9–12.7)
POTASSIUM SERPL-SCNC: 4.5 MMOL/L (ref 3.5–5.3)
RBC # BLD AUTO: 4.36 MILLION/UL (ref 3.88–5.62)
SODIUM SERPL-SCNC: 137 MMOL/L (ref 136–145)
WBC # BLD AUTO: 16.5 THOUSAND/UL (ref 4.31–10.16)

## 2022-02-26 PROCEDURE — NC001 PR NO CHARGE: Performed by: EMERGENCY MEDICINE

## 2022-02-26 PROCEDURE — 99232 SBSQ HOSP IP/OBS MODERATE 35: CPT | Performed by: INTERNAL MEDICINE

## 2022-02-26 PROCEDURE — 82948 REAGENT STRIP/BLOOD GLUCOSE: CPT

## 2022-02-26 PROCEDURE — 85027 COMPLETE CBC AUTOMATED: CPT | Performed by: PHYSICIAN ASSISTANT

## 2022-02-26 PROCEDURE — 84100 ASSAY OF PHOSPHORUS: CPT | Performed by: PHYSICIAN ASSISTANT

## 2022-02-26 PROCEDURE — 80048 BASIC METABOLIC PNL TOTAL CA: CPT | Performed by: PHYSICIAN ASSISTANT

## 2022-02-26 PROCEDURE — NC001 PR NO CHARGE: Performed by: ORTHOPAEDIC SURGERY

## 2022-02-26 PROCEDURE — 99232 SBSQ HOSP IP/OBS MODERATE 35: CPT | Performed by: EMERGENCY MEDICINE

## 2022-02-26 PROCEDURE — 83735 ASSAY OF MAGNESIUM: CPT | Performed by: PHYSICIAN ASSISTANT

## 2022-02-26 RX ADMIN — POLYETHYLENE GLYCOL 3350 17 G: 17 POWDER, FOR SOLUTION ORAL at 08:29

## 2022-02-26 RX ADMIN — ENOXAPARIN SODIUM 60 MG: 60 INJECTION SUBCUTANEOUS at 22:20

## 2022-02-26 RX ADMIN — CARVEDILOL 25 MG: 25 TABLET, FILM COATED ORAL at 08:30

## 2022-02-26 RX ADMIN — CARVEDILOL 25 MG: 25 TABLET, FILM COATED ORAL at 18:10

## 2022-02-26 RX ADMIN — INSULIN LISPRO 4 UNITS: 100 INJECTION, SOLUTION INTRAVENOUS; SUBCUTANEOUS at 18:22

## 2022-02-26 RX ADMIN — CEFAZOLIN SODIUM 2000 MG: 2 SOLUTION INTRAVENOUS at 02:30

## 2022-02-26 RX ADMIN — INSULIN LISPRO 2 UNITS: 100 INJECTION, SOLUTION INTRAVENOUS; SUBCUTANEOUS at 06:32

## 2022-02-26 RX ADMIN — DOCUSATE SODIUM 100 MG: 100 CAPSULE ORAL at 08:29

## 2022-02-26 RX ADMIN — ENOXAPARIN SODIUM 60 MG: 60 INJECTION SUBCUTANEOUS at 10:14

## 2022-02-26 RX ADMIN — ACETAMINOPHEN 975 MG: 325 TABLET ORAL at 14:27

## 2022-02-26 RX ADMIN — OXYCODONE HYDROCHLORIDE 5 MG: 5 TABLET ORAL at 18:10

## 2022-02-26 RX ADMIN — ACETAMINOPHEN 975 MG: 325 TABLET ORAL at 21:31

## 2022-02-26 RX ADMIN — CHLORHEXIDINE GLUCONATE 15 ML: 1.2 SOLUTION ORAL at 08:29

## 2022-02-26 RX ADMIN — ATORVASTATIN CALCIUM 20 MG: 20 TABLET, FILM COATED ORAL at 08:29

## 2022-02-26 RX ADMIN — ACETAMINOPHEN 975 MG: 325 TABLET ORAL at 05:22
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
## 2022-02-26 NOTE — ASSESSMENT & PLAN NOTE
- left tibial fracture, present on admission   - s/p ex-fix on 2/25  - Appreciate Orthopedic surgery evaluation, recommendations and interventions as noted  - Maintain non weightbearing status on the left lower extremity in splint   - Monitor left lower extremity neurovascular exam   - Continue multimodal analgesic regimen   - Continue DVT prophylaxis; 60 mg b i d   - PT and OT evaluation and treatment as indicated  - Outpatient follow up with Orthopedic surgery for re-evaluation

## 2022-02-26 NOTE — PROGRESS NOTES
Daily Progress Note - Critical Care   Kannan Strauss 31 y o  male MRN: [id number]  Unit/Bed#: ICU 02 Encounter: [id number]        ----------------------------------------------------------------------------------------  HPI/24hr events: No acute overnight events, extubated successfully post-op and weaned from pressors  Declined hospital CPAP due to poor mask fit but no issues from respiratory standpoint   Pain well-controlled    ---------------------------------------------------------------------------------------  SUBJECTIVE  Pain controlled, no chest pain or dyspnea this AM  Notes transient discomfort associated with houston catheter removal      Review of Systems  Review of systems was reviewed and negative unless stated above in HPI/24-hour events   ---------------------------------------------------------------------------------------  Assessment and Plan:    Neuro:    Diagnosis: Analgesia for acute post-operative pain   o Plan: continue tylenol 975mg Q8h as scheduled  o Continue oxycodone 5/10mg Q6h PRN for acute moderate and severe pain  o Continue dilaudid 0 5mg q4h for breakthrough pain        CV:    Diagnosis: chronic congestve heart failure with diminished ejection fraction  o Plan: continue Carvedilol 25mg BID  o Resume home dose entresto today  o Monitor intake/output, avoid hypervolemia  o Daily weights   o    Diagnosis: hyperlipidemia   o Plan: continue atorvastatin 20m PO daily      Pulm:   Diagnosis: obstructive sleep apnea  o Plan: continue nocturnal cpap        GI:    Diagnosis: No acute issues   o Plan: continue diet as tolerated  o Bowel regimen: colace 100mg BID   o Daily miralax, 17g packet  o Tums 500mg TID PRN for reflux sx      :    Diagnosis: No acute issues   o Plan: intake and output recording  o Houston catheter discontinued   o Urinary retention protocol           F/E/N:    Plan:    Continue carbohydrate consistent cardiac diet, lo cholesterol modifier   No continuous IV fluids   replace electrolytes PRN,  Goal K>4, Mg>2, p >3      Heme/Onc:    Diagnosis: Acute procoagulant state 2/2 fracture and operation   o Plan: continue enoxaparin 60mg Q12h  o Monitor daily serum Hb       Endo:    Diagnosis: diabetes Mellitus Type II  o Plan: serum glucose monitoring  o Sliding scale insulin TID Ac and Hs  o Hypoglycemia protocol         ID:    Diagnosis: No acute issues  o Plan: Complete 3 dose Surgical PPx with Cefazolin as ordered  o Monitor WBC, temperature curve, operative site for any signs of infection        MSK/Skin:    Diagnosis: Left tibial fracture s/p external fixation 2/25  o Plan: maintain fixation  o Eventual ORIF  o NWB status of LLE  o PT/OT  o Multimodal pain control       Patient appropriate for transfer out of the ICU today?: Patient does not meet criteria for ICU Follow-up Clinic; referral has not been made  Disposition: Transfer to Med-Surg   Code Status: Level 1 - Full Code  ---------------------------------------------------------------------------------------  ICU CORE MEASURES    Prophylaxis   VTE Pharmacologic Prophylaxis: Enoxaparin (Lovenox)  VTE Mechanical Prophylaxis: reason for no mechanical VTE prophylaxis acute lower extremity trauma  Stress Ulcer Prophylaxis: Prophylaxis Not Indicated     ABCDE Protocol (if indicated)  Plan to perform spontaneous awakening trial today? Not applicable  Plan to perform spontaneous breathing trial today? Not applicable  Obvious barriers to extubation? Not applicable  CAM-ICU: Negative    Invasive Devices Review  Invasive Devices  Report    Peripheral Intravenous Line            Peripheral IV 02/23/22 Right Antecubital 2 days    Peripheral IV 02/25/22 Left Forearm <1 day    Peripheral IV 02/25/22 Right Hand <1 day          Drain            Urethral Catheter Non-latex; Temperature probe 16 Fr  <1 day              Can any invasive devices be discontinued today? Yes  ---------------------------------------------------------------------------------------  OBJECTIVE    Vitals   Vitals:    22 0200 22 0300 22 0400 22 0505   BP:  119/67  134/80   Pulse: 74 64 72 74   Resp: (!) 24 18 (!) 28 21   Temp:       TempSrc:       SpO2: 98% 99% 97% 97%   Weight:       Height:         Temp (24hrs), Av 1 °F (36 7 °C), Min:96 3 °F (35 7 °C), Max:99 1 °F (37 3 °C)  Current: Temperature: 98 8 °F (37 1 °C)  HR: 88  BP: 129/86  RR: 22  SpO2: 98    Respiratory:  SpO2: SpO2: 97 %       Invasive/non-invasive ventilation settings   Respiratory  Report   Lab Data (Last 4 hours)    None         O2/Vent Data (Last 4 hours)    None                Physical Exam  Constitutional:       General: He is not in acute distress  Appearance: He is obese  HENT:      Head: Normocephalic and atraumatic  Mouth/Throat:      Mouth: Mucous membranes are moist    Eyes:      General: No scleral icterus  Pupils: Pupils are equal, round, and reactive to light  Cardiovascular:      Rate and Rhythm: Normal rate and regular rhythm  Pulmonary:      Effort: Pulmonary effort is normal  No respiratory distress  Abdominal:      General: There is no distension  Palpations: Abdomen is soft  Tenderness: There is no abdominal tenderness  Musculoskeletal:         General: Swelling, tenderness and deformity present  Left lower leg: Edema present  Skin:     General: Skin is warm and dry  Capillary Refill: Capillary refill takes 2 to 3 seconds  Neurological:      Mental Status: He is alert and oriented to person, place, and time  Mental status is at baseline     Psychiatric:         Mood and Affect: Mood normal          Behavior: Behavior normal              Laboratory and Diagnostics:  Results from last 7 days   Lab Units 22  0522 22  1308 22  1043 22  0505 22  0526 22  1502 22  1501   WBC Thousand/uL 16 50* 11 85*  --  7 81 7  72 7 50  --    HEMOGLOBIN g/dL 11 9* 12 7  --  12 5 12 3 13 5  --    I STAT HEMOGLOBIN g/dl  --   --  13 6  --   --   --  14 6   HEMATOCRIT % 38 1 41 2  --  40 8 39 1 44 6  --    HEMATOCRIT, ISTAT %  --   --  40  --   --   --  43   PLATELETS Thousands/uL 248 261  --  254 237 257  --    NEUTROS PCT %  --   --   --  65 68 69  --    MONOS PCT %  --   --   --  11 11 8  --      Results from last 7 days   Lab Units 02/25/22  1308 02/25/22  1043 02/25/22  0505 02/24/22  0526 02/23/22  1502 02/23/22  1501   SODIUM mmol/L 138  --  140 139 138  --    POTASSIUM mmol/L 4 6  --  4 4 4 3 3 6  --    CHLORIDE mmol/L 108  --  108 107 105  --    CO2 mmol/L 27  --  28 27 28  --    CO2, I-STAT mmol/L  --  29  --   --   --  30   ANION GAP mmol/L 3*  --  4 5 5  --    BUN mg/dL 14  --  11 10 11  --    CREATININE mg/dL 1 32*  --  1 06 1 01 1 25  --    CALCIUM mg/dL 8 3  --  8 6 8 8 9 0  --    GLUCOSE RANDOM mg/dL 180*  --  107 116 163*  --      Results from last 7 days   Lab Units 02/25/22  1308 02/25/22  0505 02/24/22  0526   MAGNESIUM mg/dL 2 3 2 6 2 6   PHOSPHORUS mg/dL 2 6* 3 7  --       Results from last 7 days   Lab Units 02/25/22  0505 02/24/22  0526 02/23/22  1601   INR  1 05 1 06 1 01   PTT seconds 32 26 26          Results from last 7 days   Lab Units 02/25/22  1308   LACTIC ACID mmol/L 1 6     ABG:  Results from last 7 days   Lab Units 02/25/22  1313   PH ART  7 342*   PCO2 ART mm Hg 50 8*   PO2 ART mm Hg 78 2   HCO3 ART mmol/L 26 9   BASE EXC ART mmol/L 0 4   ABG SOURCE  Line, Arterial     VBG:  Results from last 7 days   Lab Units 02/25/22  1313   ABG SOURCE  Line, Arterial           Micro        EKG: n/a  Imaging: XR chest portable    Result Date: 2/25/2022  Impression: Interval placement of endotracheal tube which terminates approximately 2 cm above the beverley and right IJ CVP line which terminates in the SVC Persistent cardiomegaly Workstation performed: [id number]     XR chest portable    Result Date: 2/24/2022  Impression: Cardiomegaly with pulmonary vascular congestion and hazy patchy bilateral infiltrates  Workstation performed: [id number]     XR tibia fibula 2 vw left    Result Date: 2/25/2022  Impression: Fluoroscopic guidance provided for procedure guidance  Please refer to the separate procedure notes for additional details  Workstation performed: [id number]     XR tibia fibula 2 vw left    Result Date: 2/24/2022  Impression: Acute comminuted intra-articular fracture of the distal tibia  Workstation performed: [id number]     XR ankle 3+ views LEFT    Result Date: 2/23/2022  Impression: Comminuted fracture distal tibial shaft  Workstation performed: [id number]     CT head wo contrast    Result Date: 2/23/2022  Impression: No acute intracranial abnormality  Small hyperdensity in left anterior inferior frontal lobe was favored to represent artifact on prior exam  Small hyperdense cutaneous lesions in right frontal region and midline forehead region, may represent scalp hematomas or scalp lesions  Recommend direct visualization  The study was marked in [facility] for immediate notification  Workstation performed: [id number]     TRAUMA - CT head wo contrast    Result Date: 2/23/2022  Impression: Tiny focus of hyperdensity in the inferior left frontal lobe possibly within the sulcus  Differential considerations would include a tiny focus of subarachnoid hemorrhage, developmental venous anomaly, or artifact  Recommend a repeat CT study with the patient's chin towards the chest to exclude the possibility of an artifact  I personally discussed this study with Lisa Almeida on 2/23/2022 at 3:46 PM  Workstation performed: [id number]     TRAUMA - CT spine cervical wo contrast    Result Date: 2/23/2022  Impression: No cervical spine fracture or traumatic malalignment   I personally discussed this study with Lisa Almeida on 2/23/2022 at 3:46 PM   Workstation performed: [id number]     TRAUMA - CT chest abdomen pelvis w contrast    Result Date: 2/23/2022  Impression: Inflammatory stranding in the mid to lower right abdominal wall may reflect contusions  Otherwise, no acute traumatic CT findings  Cardiomegaly with hazy groundglass opacities in the lungs bilaterally, nonspecific but may reflect pulmonary edema  Atypical infection is not excluded  Borderline mediastinal lymphadenopathy, nonspecific  Left adrenal gland nodule  I personally discussed this study with Andre Bettencourt on 2/23/2022 at 3:46 PM  Workstation performed: [id number]     XR trauma multiple    Result Date: 2/23/2022  Impression: Cardiomegaly with pulmonary vascular congestion and hazy patchy bilateral infiltrates  Workstation performed: [id number]     CT lower extremity wo contrast left    Result Date: 2/23/2022  Impression: Comminuted, mildly displaced fracture distal tibial metadiaphysis with vertical intra-articular fracture extension to the plafond  Workstation performed: [id number]       Intake and Output  I/O       02/24 0701  02/25 0700 02/25 0701  02/26 0700    P  O  720 460    I V  (mL/kg) 1156 7 (7 2) 1700 (10 6)    IV Piggyback  250    Total Intake(mL/kg) 1876 7 (11 7) 2410 (15 1)    Urine (mL/kg/hr) 2100 (0 5) 1000 (0 5)    Total Output 2100 1000    Net -223 3 +1410              UOP: 100 ml/hr     Height and Weights   Height: 5' 10" (177 8 cm)  IBW (Ideal Body Weight): 73 kg  Body mass index is 50 61 kg/m²  Weight (last 2 days)     None            Nutrition       Diet Orders   (From admission, onward)             Start     Ordered    02/25/22 1637  Diet Cardiovascular; Cardiac;  Lo Cholesterol, Consistent Carbohydrate Diet Level 3 (6 carb servings/90 grams CHO/meal)  Diet effective now        References:    Nutrtion Support Algorithm Enteral vs  Parenteral   Question Answer Comment   Diet Type Cardiovascular    Cardiac Cardiac    Other Restriction(s): Lo Cholesterol    Other Restriction(s): Consistent Carbohydrate Diet Level 3 (6 carb servings/90 grams CHO/meal)    RD to adjust diet per protocol? No        02/25/22 1637                    Active Medications  Scheduled Meds:  Current Facility-Administered Medications   Medication Dose Route Frequency Provider Last Rate    acetaminophen  975 mg Oral Q8H [address] Fracisco Elizabeth PA-C      atorvastatin  20 mg Oral Daily Kalen Elise      calcium carbonate  500 mg Oral Daily PRN Annmarie Juarez PA-C      carvedilol  25 mg Oral BID With Meals MITRA Aguirre      chlorhexidine  15 mL Mouth/Throat Q12H [address] Lalito Lyon CRNA      docusate sodium  100 mg Oral BID Annmarie Juarez PA-C      enoxaparin  60 mg Subcutaneous Q12H [address] [facility] [place]      HYDROmorphone  0 5 mg Intravenous Q4H PRN Annmarie Juarez PA-C      insulin lispro  2-12 Units Subcutaneous Q6H [address] [facility] [place]      oxyCODONE  10 mg Oral Q4H PRN Annmarie Juarez PA-C      oxyCODONE  5 mg Oral Q4H PRN Annmarie Juarez PA-C      polyethylene glycol  17 g Oral Daily Fracisco Elizabeth PA-C       Continuous Infusions:     PRN Meds:   calcium carbonate, 500 mg, Daily PRN  HYDROmorphone, 0 5 mg, Q4H PRN  oxyCODONE, 10 mg, Q4H PRN  oxyCODONE, 5 mg, Q4H PRN        Allergies   Allergies   Allergen Reactions    Banana - Food Allergy Other (See Comments)          Bee Venom Other (See Comments)           ---------------------------------------------------------------------------------------  Advance Directive and Living Will:      Power of :    POLST:    ---------------------------------------------------------------------------------------  Care Time Delivered:   See attestation    Jose Elias Holt MD      Portions of the record may have been created with voice recognition software  Occasional wrong word or "sound a like" substitutions may have occurred due to the inherent limitations of voice recognition software    Read the chart carefully and recognize, using context, where substitutions have occurred

## 2022-02-26 NOTE — PROGRESS NOTES
Progress Note - Orthopedics   Cook Fall 31 y o  male MRN: [id number]  Unit/Bed#: ICU 2-01      Subjective:    26 y o male ICU admission postoperatively yesterday due to intubation, hx of cardiomyopathy  Extubated overnight satting well on room air  Pt doing well  Pain controlled to left lower extremity   Denies fevers chills, CP, subjective SOB    Labs:  0   Lab Value Date/Time    HCT 38 1 02/26/2022 0522    HCT 41 2 02/25/2022 1308    HCT 40 02/25/2022 1043    HCT 40 8 02/25/2022 0505    HGB 11 9 (L) 02/26/2022 0522    HGB 12 7 02/25/2022 1308    HGB 13 6 02/25/2022 1043    HGB 12 5 02/25/2022 0505    INR 1 05 02/25/2022 0505    WBC 16 50 (H) 02/26/2022 0522    WBC 11 85 (H) 02/25/2022 1308    WBC 7 81 02/25/2022 0505       Meds:    Current Facility-Administered Medications:     acetaminophen (TYLENOL) tablet 975 mg, 975 mg, Oral, Q8H [address], Fracisco Elizabeth PA-C, 975 mg at 02/26/22 0522    atorvastatin (LIPITOR) tablet 20 mg, 20 mg, Oral, Daily, Kaylah Dangelo PA-C, 20 mg at 02/24/22 0827    calcium carbonate (TUMS) chewable tablet 500 mg, 500 mg, Oral, Daily PRN, Kaylah Dangelo PA-C, 500 mg at 02/23/22 2328    carvedilol (COREG) tablet 25 mg, 25 mg, Oral, BID With Meals, Devin Manriquez, CRNP, 25 mg at 02/25/22 1714    chlorhexidine (PERIDEX) 0 12 % oral rinse 15 mL, 15 mL, Mouth/Throat, Q12H [address], Patricia Doni, CRNA, 15 mL at 02/25/22 2053    docusate sodium (COLACE) capsule 100 mg, 100 mg, Oral, BID, Fracisco Elizabeth PA-C, 100 mg at 02/25/22 1714    enoxaparin (LOVENOX) subcutaneous injection 60 mg, 60 mg, Subcutaneous, Q12H [address], Fracisco Elizabeth PA-C, 60 mg at 02/25/22 2155    HYDROmorphone (DILAUDID) injection 0 5 mg, 0 5 mg, Intravenous, Q4H PRN, Kaylah Dangelo PA-C    insulin lispro (HumaLOG) 100 units/mL subcutaneous injection 2-12 Units, 2-12 Units, Subcutaneous, Q6H [address], 2 Units at 02/25/22 0022 **AND** Fingerstick Glucose (POCT), , , Q6H, Kaylah Batten, PA-ELTON    oxyCODONE (ROXICODONE) immediate release tablet 10 mg, 10 mg, Oral, Q4H PRN, Yuan YENIFER Willingham-C, 10 mg at 02/25/22 0531    oxyCODONE (ROXICODONE) IR tablet 5 mg, 5 mg, Oral, Q4H PRN, Yuan Coe, PA-C    polyethylene glycol (MIRALAX) packet 17 g, 17 g, Oral, Daily, Fracisco Elizabeth PA-C    Blood Culture:   No results found for: BLOODCX    Wound Culture:   No results found for: WOUNDCULT    Ins and Outs:  I/O last 24 hours: In: 4136 7 [P O :2020; I V :1866 7; IV Piggyback:250]  Out: 1757 [Urine:3425]          Physical:  Vitals:    02/26/22 0505   BP: 134/80   Pulse: 74   Resp: 21   Temp:    SpO2: 97%     Musculoskeletal: left Lower Extremity  · Skin warm dry, tissues of lower leg soft depressible, mild 1+ edema to foot exposed under ex fix dressing  · Dressing around ex fix clean dry intact  · TTP left distal ankle mild  · SILT s/s/sp/dp/t  +fhl/ehl   2+ DP pulse, brisk cap refill to toes    Assessment:    26 y o male POD 1 left ankle spanning ex fix, doing well from orthopedic perspective  Patient will require definitive fixation in about 2 weeks time after swelling has gone down  Plan:  · NWB LLE  · No diagnosis of acute blood loss anemia, will monitor and administer IVF/prbc as indicated   · PT/OT  · Pain control  · DVT ppx - recommend lovenox or other now and for 28 days after internalization of fixation construct    · Dispo: Ortho will follow    Herminia Pagan MD

## 2022-02-26 NOTE — PLAN OF CARE
Problem: MOBILITY - ADULT  Goal: Maintain or return to baseline ADL function  Description: INTERVENTIONS:  -  Assess patient's ability to carry out ADLs; assess patient's baseline for ADL function and identify physical deficits which impact ability to perform ADLs (bathing, care of mouth/teeth, toileting, grooming, dressing, etc )  - Assess/evaluate cause of self-care deficits   - Assess range of motion  - Assess patient's mobility; develop plan if impaired  - Assess patient's need for assistive devices and provide as appropriate  - Encourage maximum independence but intervene and supervise when necessary  - Involve family in performance of ADLs  - Assess for home care needs following discharge   - Consider OT consult to assist with ADL evaluation and planning for discharge  - Provide patient education as appropriate  Outcome: Progressing  Goal: Maintains/Returns to pre admission functional level  Description: INTERVENTIONS:  - Perform BMAT or MOVE assessment daily    - Set and communicate daily mobility goal to care team and patient/family/caregiver     - Collaborate with rehabilitation services on mobility goals if consulted  - Out of bed for toileting  - Record patient progress and toleration of activity level   Outcome: Progressing     Problem: PAIN - ADULT  Goal: Verbalizes/displays adequate comfort level or baseline comfort level  Description: Interventions:  - Encourage patient to monitor pain and request assistance  - Assess pain using appropriate pain scale  - Administer analgesics based on type and severity of pain and evaluate response  - Implement non-pharmacological measures as appropriate and evaluate response  - Consider cultural and social influences on pain and pain management  - Notify physician/advanced practitioner if interventions unsuccessful or patient reports new pain  Outcome: Progressing     Problem: INFECTION - ADULT  Goal: Absence or prevention of progression during hospitalization  Description: INTERVENTIONS:  - Assess and monitor for signs and symptoms of infection  - Monitor lab/diagnostic results  - Monitor all insertion sites, i e  indwelling lines, tubes, and drains  - [place] appropriate cooling/warming therapies per order  - Administer medications as ordered  - Instruct and encourage patient and family to use good hand hygiene technique  - Identify and instruct in appropriate isolation precautions for identified infection/condition  Outcome: Progressing  Goal: Absence of fever/infection during neutropenic period  Description: INTERVENTIONS:  - Monitor WBC    Outcome: Progressing     Problem: SAFETY ADULT  Goal: Maintain or return to baseline ADL function  Description: INTERVENTIONS:  -  Assess patient's ability to carry out ADLs; assess patient's baseline for ADL function and identify physical deficits which impact ability to perform ADLs (bathing, care of mouth/teeth, toileting, grooming, dressing, etc )  - Assess/evaluate cause of self-care deficits   - Assess range of motion  - Assess patient's mobility; develop plan if impaired  - Assess patient's need for assistive devices and provide as appropriate  - Encourage maximum independence but intervene and supervise when necessary  - Involve family in performance of ADLs  - Assess for home care needs following discharge   - Consider OT consult to assist with ADL evaluation and planning for discharge  - Provide patient education as appropriate  Outcome: Progressing  Goal: Maintains/Returns to pre admission functional level  Description: INTERVENTIONS:  - Perform BMAT or MOVE assessment daily    - Set and communicate daily mobility goal to care team and patient/family/caregiver     - Collaborate with rehabilitation services on mobility goals if consulted  - Out of bed for toileting  - Record patient progress and toleration of activity level   Outcome: Progressing  Goal: Patient will remain free of falls  Description: INTERVENTIONS:  -  Assess patient's ability to carry out ADLs; assess patient's baseline for ADL function and identify physical deficits which impact ability to perform ADLs (bathing, care of mouth/teeth, toileting, grooming, dressing, etc )  - Assess/evaluate cause of self-care deficits   - Assess range of motion  - Assess patient's mobility; develop plan if impaired  - Assess patient's need for assistive devices and provide as appropriate  - Encourage maximum independence but intervene and supervise when necessary  - Involve family in performance of ADLs  - Assess for home care needs following discharge   - Consider OT consult to assist with ADL evaluation and planning for discharge  - Provide patient education as appropriate  Outcome: Progressing     Problem: DISCHARGE PLANNING  Goal: Discharge to home or other facility with appropriate resources  Description: INTERVENTIONS:  - Identify barriers to discharge w/patient and caregiver  - Arrange for needed discharge resources and transportation as appropriate  - Identify discharge learning needs (meds, wound care, etc )  - Arrange for interpretive services to assist at discharge as needed  - Refer to Case Management Department for coordinating discharge planning if the patient needs post-hospital services based on physician/advanced practitioner order or complex needs related to functional status, cognitive ability, or social support system  Outcome: Progressing     Problem: Knowledge Deficit  Goal: Patient/family/caregiver demonstrates understanding of disease process, treatment plan, medications, and discharge instructions  Description: Complete learning assessment and assess knowledge base    Interventions:  - Provide teaching at level of understanding  - Provide teaching via preferred learning methods  Outcome: Progressing     Problem: Prexisting or High Potential for Compromised Skin Integrity  Goal: Skin integrity is maintained or improved  Description: INTERVENTIONS:  - Identify patients at risk for skin breakdown  - Assess and monitor skin integrity  - Assess and monitor nutrition and hydration status  - Monitor labs   - Assess for incontinence   - Turn and reposition patient  - Assist with mobility/ambulation  - Relieve pressure over bony prominences  - Avoid friction and shearing  - Provide appropriate hygiene as needed including keeping skin clean and dry  - Evaluate need for skin moisturizer/barrier cream  - Collaborate with interdisciplinary team   - Patient/family teaching  - Consider wound care consult   Outcome: Progressing     Problem: MUSCULOSKELETAL - ADULT  Goal: Maintain or return mobility to safest level of function  Description: INTERVENTIONS:  - Assess patient's ability to carry out ADLs; assess patient's baseline for ADL function and identify physical deficits which impact ability to perform ADLs (bathing, care of mouth/teeth, toileting, grooming, dressing, etc )  - Assess/evaluate cause of self-care deficits   - Assess range of motion  - Assess patient's mobility  - Assess patient's need for assistive devices and provide as appropriate  - Encourage maximum independence but intervene and supervise when necessary  - Involve family in performance of ADLs  - Assess for home care needs following discharge   - Consider OT consult to assist with ADL evaluation and planning for discharge  - Provide patient education as appropriate  Outcome: Progressing  Goal: Maintain proper alignment of affected body part  Description: INTERVENTIONS:  - Support, maintain and protect limb and body alignment  - Provide patient/ family with appropriate education  Outcome: Progressing     Problem: Potential for Falls  Goal: Patient will remain free of falls  Description: INTERVENTIONS:  -  Assess patient's ability to carry out ADLs; assess patient's baseline for ADL function and identify physical deficits which impact ability to perform ADLs (bathing, care of mouth/teeth, toileting, grooming, dressing, etc )  - Assess/evaluate cause of self-care deficits   - Assess range of motion  - Assess patient's mobility; develop plan if impaired  - Assess patient's need for assistive devices and provide as appropriate  - Encourage maximum independence but intervene and supervise when necessary  - Involve family in performance of ADLs  - Assess for home care needs following discharge   - Consider OT consult to assist with ADL evaluation and planning for discharge  - Provide patient education as appropriate  Outcome: Progressing     Problem: Nutrition/Hydration-ADULT  Goal: Nutrient/Hydration intake appropriate for improving, restoring or maintaining nutritional needs  Description: Monitor and assess patient's nutrition/hydration status for malnutrition  Collaborate with interdisciplinary team and initiate plan and interventions as ordered  Monitor patient's weight and dietary intake as ordered or per policy  Utilize nutrition screening tool and intervene as necessary  Determine patient's food preferences and provide high-protein, high-caloric foods as appropriate       INTERVENTIONS:  - Monitor oral intake, urinary output, labs, and treatment plans  - Assess nutrition and hydration status and recommend course of action  - Evaluate amount of meals eaten  - Assist patient with eating if necessary   - Allow adequate time for meals  - Recommend/ encourage appropriate diets, oral nutritional supplements, and vitamin/mineral supplements  - Order, calculate, and assess calorie counts as needed  - Recommend, monitor, and adjust tube feedings and TPN/PPN based on assessed needs  - Assess need for intravenous fluids  - Provide specific nutrition/hydration education as appropriate  - Include patient/family/caregiver in decisions related to nutrition  Outcome: Progressing
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
## 2022-02-26 NOTE — ASSESSMENT & PLAN NOTE
No results found for: HGBA1C    Recent Labs     02/25/22  1126 02/25/22  1729 02/26/22  0021 02/26/22  0631   POCGLU 195* 136 154* 181*       Blood Sugar Average: Last 72 hrs:  (P) 002 [id number]     - continue sliding scale insulin  - close glucose control in the setting of orthopedic injury

## 2022-02-26 NOTE — PROGRESS NOTES
Cardiology Progress Note - Lalit Holloway 31 y o  male MRN: [id number]    Unit/Bed#: ICU 02 Encounter: [id number]      Assessment:  1  LV non-compaction  2  Nonischemic cardiomyopathy secondary to #1 - EF 20 to 25 %  3  Chronic HFrEF  4  Left tibia fracture status post external fixation device - POD #1  5  Benign essential hypertension  6  Dyslipidemia  7  Morbid obesity - BMI 51  8  Type 2 diabetes mellitus  9  UBALDO on CPAP  10  DAVID    Plan:  1  Remains hemodynamically stable postoperatively - on pressors transiently  2  Continue home dose carvedilol  3  Would hold Entresto and spironolactone today given DAVID in setting of hypotension intraoperatively  4  Appears euvolemic on exam - can use IV Lasix as needed if necessary   5  Continue statin therapy in setting of diabetes  6  ICD offered in past by [facility] cardiologist, patient declined    Subjective:   Patient seen and examined  No significant events overnight  Objective:     Vitals: Blood pressure 138/73, pulse 80, temperature 98 4 °F (36 9 °C), temperature source Oral, resp  rate (!) 28, height 5' 10" (1 778 m), weight (!) 160 kg (352 lb 11 8 oz), SpO2 98 %  , Body mass index is 50 61 kg/m² ,   Orthostatic Blood Pressures      Most Recent Value   Blood Pressure 138/73 filed at 02/26/2022 0945   Patient Position - Orthostatic VS Lying filed at 02/23/2022 1715            Intake/Output Summary (Last 24 hours) at 2/26/2022 0951  Last data filed at 2/26/2022 0947  Gross per 24 hour   Intake 3520 04 ml   Output 1625 ml   Net 1895 04 ml         Physical Exam:    GEN: Lalit Holloway appears well, alert and oriented x 3, pleasant and cooperative   HEENT: pupils equal, round, and reactive to light; extraocular muscles intact  NECK: supple, no carotid bruits, no elevated JVP  HEART: regular rhythm, normal S1 and S2, no murmur  LUNGS: clear to auscultation bilaterally  ABDOMEN: normal bowel sounds, soft, no tenderness, no distention  EXTREMITIES:  Left lower extremity with dressing intact, right lower extremity no edema  NEURO: no focal findings   SKIN: normal without suspicious lesions on exposed skin    Medications:      Current Facility-Administered Medications:     acetaminophen (TYLENOL) tablet 975 mg, 975 mg, Oral, Q8H [facility], Fracisco Elizabeth PA-C, 975 mg at 02/26/22 0522    atorvastatin (LIPITOR) tablet 20 mg, 20 mg, Oral, Daily, Annmarie Juarez PA-C, 20 mg at 02/26/22 2558    calcium carbonate (TUMS) chewable tablet 500 mg, 500 mg, Oral, Daily PRN, Annmarie Juarez PA-C, 500 mg at 02/23/22 2328    carvedilol (COREG) tablet 25 mg, 25 mg, Oral, BID With Meals, MITRA Aguirre, 25 mg at 02/26/22 0830    docusate sodium (COLACE) capsule 100 mg, 100 mg, Oral, BID, Annmarie Juarez PA-C, 100 mg at 02/26/22 0829    enoxaparin (LOVENOX) subcutaneous injection 60 mg, 60 mg, Subcutaneous, Q12H [facility], Fracisco Elizabeth PA-C, 60 mg at 02/25/22 2155    HYDROmorphone (DILAUDID) injection 0 5 mg, 0 5 mg, Intravenous, Q4H PRN, Annmarie Juarez PA-C    insulin lispro (HumaLOG) 100 units/mL subcutaneous injection 2-12 Units, 2-12 Units, Subcutaneous, Q6H [facility], 2 Units at 02/26/22 4556 **AND** Fingerstick Glucose (POCT), , , Q6H, Annmarie Juarez PA-C    oxyCODONE (ROXICODONE) immediate release tablet 10 mg, 10 mg, Oral, Q4H PRN, Annmarie Juarez PA-C, 10 mg at 02/25/22 0531    oxyCODONE (ROXICODONE) IR tablet 5 mg, 5 mg, Oral, Q4H PRN, Annmarie Juarez PA-C    polyethylene glycol (MIRALAX) packet 17 g, 17 g, Oral, Daily, Annmarie Juarez PA-C, 17 g at 02/26/22 0829     Labs & Results:        Results from last 7 days   Lab Units 02/26/22  0522 02/25/22  1308 02/25/22  1043 02/25/22  0505 02/23/22  1501   WBC Thousand/uL 16 50* 11 85*  --  7 81  --    HEMOGLOBIN g/dL 11 9* 12 7  --  12 5  --    I STAT HEMOGLOBIN g/dl  --   --  13 6  --    < >   HEMATOCRIT % 38 1 41 2  --  40 8  --    HEMATOCRIT, ISTAT %  --   --  40  --    < > PLATELETS Thousands/uL 248 261  --  254  --     < > = values in this interval not displayed  Results from last 7 days   Lab Units 02/26/22  0522 02/25/22  1308 02/25/22  1043 02/25/22  0505 02/23/22  1502 02/23/22  1501 02/23/22  1501   POTASSIUM mmol/L 4 5 4 6  --  4 4   < >  --   --    CHLORIDE mmol/L 104 108  --  108   < >  --   --    CO2 mmol/L 27 27  --  28   < >  --   --    CO2, I-STAT mmol/L  --   --  29  --   --    < > 30   BUN mg/dL 23 14  --  11   < >  --   --    CREATININE mg/dL 1 42* 1 32*  --  1 06   < >  --   --    CALCIUM mg/dL 8 8 8 3  --  8 6   < >  --   --    GLUCOSE, ISTAT mg/dl  --   --  204*  --   --   --  166*    < > = values in this interval not displayed  Results from last 7 days   Lab Units 02/25/22  0505 02/24/22  0526 02/23/22  1601   INR  1 05 1 06 1 01   PTT seconds 32 26 26     Results from last 7 days   Lab Units 02/26/22  0522 02/25/22  1308 02/25/22  0505   MAGNESIUM mg/dL 2 6 2 3 2 6       Counseling / Coordination of Care  Total floor / unit time spent today 25 minutes  Greater than 50% of total time was spent with the patient and / or family counseling and / or coordination of care

## 2022-02-27 LAB
ANION GAP SERPL CALCULATED.3IONS-SCNC: 4 MMOL/L (ref 4–13)
ANION GAP SERPL CALCULATED.3IONS-SCNC: 5 MMOL/L (ref 4–13)
BASOPHILS # BLD AUTO: 0.05 THOUSANDS/ΜL (ref 0–0.1)
BASOPHILS NFR BLD AUTO: 0 % (ref 0–1)
BUN SERPL-MCNC: 19 MG/DL (ref 5–25)
BUN SERPL-MCNC: 23 MG/DL (ref 5–25)
CALCIUM SERPL-MCNC: 8.8 MG/DL (ref 8.3–10.1)
CALCIUM SERPL-MCNC: 8.9 MG/DL (ref 8.3–10.1)
CHLORIDE SERPL-SCNC: 109 MMOL/L (ref 100–108)
CHLORIDE SERPL-SCNC: 109 MMOL/L (ref 100–108)
CO2 SERPL-SCNC: 21 MMOL/L (ref 21–32)
CO2 SERPL-SCNC: 27 MMOL/L (ref 21–32)
CREAT SERPL-MCNC: 1.1 MG/DL (ref 0.6–1.3)
CREAT SERPL-MCNC: 1.23 MG/DL (ref 0.6–1.3)
EOSINOPHIL # BLD AUTO: 0.37 THOUSAND/ΜL (ref 0–0.61)
EOSINOPHIL NFR BLD AUTO: 3 % (ref 0–6)
ERYTHROCYTE [DISTWIDTH] IN BLOOD BY AUTOMATED COUNT: 13.9 % (ref 11.6–15.1)
GFR SERPL CREATININE-BSD FRML MDRD: 79 ML/MIN/1.73SQ M
GFR SERPL CREATININE-BSD FRML MDRD: 91 ML/MIN/1.73SQ M
GLUCOSE SERPL-MCNC: 114 MG/DL (ref 65–140)
GLUCOSE SERPL-MCNC: 142 MG/DL (ref 65–140)
GLUCOSE SERPL-MCNC: 146 MG/DL (ref 65–140)
GLUCOSE SERPL-MCNC: 149 MG/DL (ref 65–140)
GLUCOSE SERPL-MCNC: 158 MG/DL (ref 65–140)
GLUCOSE SERPL-MCNC: 165 MG/DL (ref 65–140)
GLUCOSE SERPL-MCNC: 98 MG/DL (ref 65–140)
HCT VFR BLD AUTO: 39.7 % (ref 36.5–49.3)
HGB BLD-MCNC: 12.5 G/DL (ref 12–17)
IMM GRANULOCYTES # BLD AUTO: 0.05 THOUSAND/UL (ref 0–0.2)
IMM GRANULOCYTES NFR BLD AUTO: 0 % (ref 0–2)
LYMPHOCYTES # BLD AUTO: 1.35 THOUSANDS/ΜL (ref 0.6–4.47)
LYMPHOCYTES NFR BLD AUTO: 10 % (ref 14–44)
MCH RBC QN AUTO: 27.5 PG (ref 26.8–34.3)
MCHC RBC AUTO-ENTMCNC: 31.5 G/DL (ref 31.4–37.4)
MCV RBC AUTO: 87 FL (ref 82–98)
MONOCYTES # BLD AUTO: 1.08 THOUSAND/ΜL (ref 0.17–1.22)
MONOCYTES NFR BLD AUTO: 8 % (ref 4–12)
NEUTROPHILS # BLD AUTO: 11.05 THOUSANDS/ΜL (ref 1.85–7.62)
NEUTS SEG NFR BLD AUTO: 79 % (ref 43–75)
NRBC BLD AUTO-RTO: 0 /100 WBCS
PLATELET # BLD AUTO: 258 THOUSANDS/UL (ref 149–390)
PMV BLD AUTO: 10.8 FL (ref 8.9–12.7)
POTASSIUM SERPL-SCNC: 4.2 MMOL/L (ref 3.5–5.3)
POTASSIUM SERPL-SCNC: 5.6 MMOL/L (ref 3.5–5.3)
RBC # BLD AUTO: 4.54 MILLION/UL (ref 3.88–5.62)
SODIUM SERPL-SCNC: 135 MMOL/L (ref 136–145)
SODIUM SERPL-SCNC: 140 MMOL/L (ref 136–145)
WBC # BLD AUTO: 13.95 THOUSAND/UL (ref 4.31–10.16)

## 2022-02-27 PROCEDURE — 85025 COMPLETE CBC W/AUTO DIFF WBC: CPT | Performed by: EMERGENCY MEDICINE

## 2022-02-27 PROCEDURE — 99232 SBSQ HOSP IP/OBS MODERATE 35: CPT | Performed by: SURGERY

## 2022-02-27 PROCEDURE — 99231 SBSQ HOSP IP/OBS SF/LOW 25: CPT | Performed by: INTERNAL MEDICINE

## 2022-02-27 PROCEDURE — NC001 PR NO CHARGE: Performed by: ORTHOPAEDIC SURGERY

## 2022-02-27 PROCEDURE — 80048 BASIC METABOLIC PNL TOTAL CA: CPT | Performed by: EMERGENCY MEDICINE

## 2022-02-27 PROCEDURE — 97163 PT EVAL HIGH COMPLEX 45 MIN: CPT

## 2022-02-27 PROCEDURE — 82948 REAGENT STRIP/BLOOD GLUCOSE: CPT

## 2022-02-27 PROCEDURE — 80048 BASIC METABOLIC PNL TOTAL CA: CPT | Performed by: PHYSICIAN ASSISTANT

## 2022-02-27 PROCEDURE — 97167 OT EVAL HIGH COMPLEX 60 MIN: CPT

## 2022-02-27 RX ORDER — HYDRALAZINE HYDROCHLORIDE 20 MG/ML
5 INJECTION INTRAMUSCULAR; INTRAVENOUS EVERY 6 HOURS PRN
Status: DISCONTINUED | OUTPATIENT
Start: 2022-02-27 | End: 2022-03-05 | Stop reason: HOSPADM

## 2022-02-27 RX ORDER — SPIRONOLACTONE 25 MG/1
25 TABLET ORAL DAILY
Status: DISCONTINUED | OUTPATIENT
Start: 2022-02-27 | End: 2022-03-05 | Stop reason: HOSPADM

## 2022-02-27 RX ADMIN — INSULIN LISPRO 2 UNITS: 100 INJECTION, SOLUTION INTRAVENOUS; SUBCUTANEOUS at 00:20

## 2022-02-27 RX ADMIN — SACUBITRIL AND VALSARTAN 1 TABLET: 97; 103 TABLET, FILM COATED ORAL at 17:59

## 2022-02-27 RX ADMIN — ACETAMINOPHEN 975 MG: 325 TABLET ORAL at 21:02

## 2022-02-27 RX ADMIN — SPIRONOLACTONE 25 MG: 25 TABLET ORAL at 14:17

## 2022-02-27 RX ADMIN — OXYCODONE HYDROCHLORIDE 10 MG: 10 TABLET ORAL at 03:08

## 2022-02-27 RX ADMIN — ATORVASTATIN CALCIUM 20 MG: 20 TABLET, FILM COATED ORAL at 08:55

## 2022-02-27 RX ADMIN — ACETAMINOPHEN 975 MG: 325 TABLET ORAL at 14:16

## 2022-02-27 RX ADMIN — CARVEDILOL 25 MG: 25 TABLET, FILM COATED ORAL at 18:00

## 2022-02-27 RX ADMIN — ACETAMINOPHEN 975 MG: 325 TABLET ORAL at 05:38

## 2022-02-27 RX ADMIN — ENOXAPARIN SODIUM 60 MG: 60 INJECTION SUBCUTANEOUS at 21:03

## 2022-02-27 RX ADMIN — CARVEDILOL 25 MG: 25 TABLET, FILM COATED ORAL at 08:55

## 2022-02-27 RX ADMIN — DOCUSATE SODIUM 100 MG: 100 CAPSULE ORAL at 17:59

## 2022-02-27 RX ADMIN — SACUBITRIL AND VALSARTAN 1 TABLET: 97; 103 TABLET, FILM COATED ORAL at 12:45
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
## 2022-02-27 NOTE — ASSESSMENT & PLAN NOTE
No results found for: HGBA1C    Recent Labs     02/26/22 2047 02/27/22  0018 02/27/22  0537 02/27/22  1135   POCGLU 193* 165* 114 142*       Blood Sugar Average: Last 72 hrs:  (P) 156     - continue sliding scale insulin  - close glucose control in the setting of orthopedic injury
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
## 2022-02-27 NOTE — PROGRESS NOTES
Cardiology Progress Note - Josh Salas 31 y o  male MRN: [id number]    [facility] Encounter: [id number]      Assessment:  1  LV non-compaction  2  Nonischemic cardiomyopathy secondary to #1 - EF 20 to 25 %  3  Chronic HFrEF  4  Left tibia fracture status post external fixation device - POD #2  5  Benign essential hypertension  6  Dyslipidemia  7  Morbid obesity - BMI 51  8  Type 2 diabetes mellitus  9  UBALDO on CPAP  10  DAVID    Plan:  1  Remains hemodynamically stable postoperatively - on pressors transiently post-operatively   2  Continue home dose carvedilol  3  Renal function improved, DAVID given intraoperative hypotension  - resume Entresto, hold spironolactone in the setting of mild hyperkalemia  4  Appears euvolemic on exam - can use IV Lasix as needed if necessary   5  Continue statin therapy in setting of diabetes  6  ICD offered in past by [facility] cardiologist, patient declined    Subjective:   Patient seen and examined  No significant events overnight  Objective:     Vitals: Blood pressure (!) 176/101, pulse 84, temperature 98 2 °F (36 8 °C), resp  rate 20, height 5' 10" (1 778 m), weight (!) 160 kg (352 lb 11 8 oz), SpO2 97 %  , Body mass index is 50 61 kg/m² ,   Orthostatic Blood Pressures      Most Recent Value   Blood Pressure 176/101 filed at 02/27/2022 0848   Patient Position - Orthostatic VS Lying filed at 02/23/2022 1715            Intake/Output Summary (Last 24 hours) at 2/27/2022 4154  Last data filed at 2/27/2022 0600  Gross per 24 hour   Intake 1240 ml   Output 300 ml   Net 940 ml         Physical Exam:    GEN: Josh Salas appears well, alert and oriented x 3, pleasant and cooperative   HEENT: pupils equal, round, and reactive to light; extraocular muscles intact  NECK: supple, no carotid bruits, no elevated JVP  HEART: regular rhythm, normal S1 and S2, no murmur  LUNGS: clear to auscultation bilaterally  ABDOMEN: normal bowel sounds, soft, no tenderness, no distention  EXTREMITIES:  Left lower extremity with dressing intact, right lower extremity no edema  NEURO: no focal findings   SKIN: normal without suspicious lesions on exposed skin    Medications:      Current Facility-Administered Medications:     acetaminophen (TYLENOL) tablet 975 mg, 975 mg, Oral, Q8H [address], Windy Borrego MD, 975 mg at 02/27/22 0538    atorvastatin (LIPITOR) tablet 20 mg, 20 mg, Oral, Daily, Windy Borrego MD, 20 mg at 02/27/22 9592    calcium carbonate (TUMS) chewable tablet 500 mg, 500 mg, Oral, Daily PRN, Windy Borrego MD, 500 mg at 02/23/22 2328    carvedilol (COREG) tablet 25 mg, 25 mg, Oral, BID With Meals, Windy Borrego MD, 25 mg at 02/27/22 0855    docusate sodium (COLACE) capsule 100 mg, 100 mg, Oral, BID, Windy Borrego MD, 100 mg at 02/26/22 0829    enoxaparin (LOVENOX) subcutaneous injection 60 mg, 60 mg, Subcutaneous, Q12H [address], Windy Borrego MD, 60 mg at 02/26/22 2220    HYDROmorphone (DILAUDID) injection 0 5 mg, 0 5 mg, Intravenous, Q4H PRN, Windy Borrego MD    insulin lispro (HumaLOG) 100 units/mL subcutaneous injection 2-12 Units, 2-12 Units, Subcutaneous, Q6H [address], 2 Units at 02/27/22 0020 **AND** Fingerstick Glucose (POCT), , , Q6H, Windy Borrego MD    oxyCODONE (ROXICODONE) immediate release tablet 10 mg, 10 mg, Oral, Q4H PRN, Windy Borrego MD, 10 mg at 02/27/22 0308    oxyCODONE (ROXICODONE) IR tablet 5 mg, 5 mg, Oral, Q4H PRN, Windy Borrego MD, 5 mg at 02/26/22 1810    polyethylene glycol (MIRALAX) packet 17 g, 17 g, Oral, Daily, Windy Borrego MD, 17 g at 02/26/22 0829     Labs & Results:        Results from last 7 days   Lab Units 02/27/22  0553 02/26/22  0522 02/25/22  1308   WBC Thousand/uL 13 95* 16 50* 11 85*   HEMOGLOBIN g/dL 12 5 11 9* 12 7   HEMATOCRIT % 39 7 38 1 41 2   PLATELETS Thousands/uL 258 248 261         Results from last 7 days   Lab Units 02/27/22  0553 02/26/22  0522 02/25/22  1308 02/25/22  1043 02/25/22  0505 02/23/22  1502 02/23/22  1501   POTASSIUM mmol/L 5 6* 4 5 4 6  --    < >   < >  --    CHLORIDE mmol/L 109* 104 108  --    < >   < >  --    CO2 mmol/L 21 27 27  --    < >   < >  --    CO2, I-STAT mmol/L  --   --   --  29  --    < > 30   BUN mg/dL 23 23 14  --    < >   < >  --    CREATININE mg/dL 1 10 1 42* 1 32*  --    < >   < >  --    CALCIUM mg/dL 8 8 8 8 8 3  --    < >   < >  --    GLUCOSE, ISTAT mg/dl  --   --   --  204*  --   --  166*    < > = values in this interval not displayed  Results from last 7 days   Lab Units 02/25/22  0505 02/24/22  0526 02/23/22  1601   INR  1 05 1 06 1 01   PTT seconds 32 26 26     Results from last 7 days   Lab Units 02/26/22  0522 02/25/22  1308 02/25/22  0505   MAGNESIUM mg/dL 2 6 2 3 2 6       Counseling / Coordination of Care  Total floor / unit time spent today 25 minutes  Greater than 50% of total time was spent with the patient and / or family counseling and / or coordination of care
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
## 2022-02-27 NOTE — ASSESSMENT & PLAN NOTE
Wt Readings from Last 3 Encounters:   02/23/22 (!) 160 kg (352 lb 11 8 oz)     - EF baseline is 20-25%  - cardiology following, appreciate input  - outpatient follow-up with Cardiology  - no other acute workup at this time  - will need to resume all CHF medications on 02/27  - patiently currently taking Coreg, Entresto, spironolactone

## 2022-02-27 NOTE — PROGRESS NOTES
Progress Note - Orthopedics   Luigi Shark 31 y o  male MRN: [id number]  Unit/Bed#: [facility] 634-01      Subjective:    31 y o male POD 2 ex fi x L pilon fracture  Pt doing well  Pain controlled to left lower extremity   Denies fevers chills, CP, subjective SOB    Labs:  0   Lab Value Date/Time    HCT 39 7 02/27/2022 0553    HCT 38 1 02/26/2022 0522    HCT 41 2 02/25/2022 1308    HCT 40 02/25/2022 1043    HGB 12 5 02/27/2022 0553    HGB 11 9 (L) 02/26/2022 0522    HGB 12 7 02/25/2022 1308    HGB 13 6 02/25/2022 1043    INR 1 05 02/25/2022 0505    WBC 13 95 (H) 02/27/2022 0553    WBC 16 50 (H) 02/26/2022 0522    WBC 11 85 (H) 02/25/2022 1308       Meds:    Current Facility-Administered Medications:     acetaminophen (TYLENOL) tablet 975 mg, 975 mg, Oral, Q8H [facility], Perry Maldonado MD, 975 mg at 02/27/22 0538    atorvastatin (LIPITOR) tablet 20 mg, 20 mg, Oral, Daily, Perry Maldonado MD, 20 mg at 02/26/22 0876    calcium carbonate (TUMS) chewable tablet 500 mg, 500 mg, Oral, Daily PRN, Perry Maldonado MD, 500 mg at 02/23/22 2328    carvedilol (COREG) tablet 25 mg, 25 mg, Oral, BID With Meals, Perry Maldonado MD, 25 mg at 02/26/22 1810    docusate sodium (COLACE) capsule 100 mg, 100 mg, Oral, BID, Perry Maldonado MD, 100 mg at 02/26/22 0829    enoxaparin (LOVENOX) subcutaneous injection 60 mg, 60 mg, Subcutaneous, Q12H [facility], Perry Maldonado MD, 60 mg at 02/26/22 2220    HYDROmorphone (DILAUDID) injection 0 5 mg, 0 5 mg, Intravenous, Q4H PRN, Perry Maldonado MD    insulin lispro (HumaLOG) 100 units/mL subcutaneous injection 2-12 Units, 2-12 Units, Subcutaneous, Q6H [facility], 2 Units at 02/27/22 0020 **AND** Fingerstick Glucose (POCT), , , Q6H, Perry Maldonado MD    oxyCODONE (ROXICODONE) immediate release tablet 10 mg, 10 mg, Oral, Q4H PRN, Perry Maldonado MD, 10 mg at 02/27/22 0308    oxyCODONE (ROXICODONE) IR tablet 5 mg, 5 mg, Oral, Q4H PRN, Perry Maldonado MD, 5 mg at 02/26/22 1810    polyethylene glycol (MIRALAX) packet 17 g, 17 g, Oral, Daily, Cassandra Mesa MD, 17 g at 02/26/22 0821    Blood Culture:   No results found for: BLOODCX    Wound Culture:   No results found for: WOUNDCULT    Ins and Outs:  I/O last 24 hours: In: 1960 [P O :1860; IV Piggyback:100]  Out: 625 [Urine:625]          Physical:  Vitals:    02/26/22 1604   BP: 143/76   Pulse: 80   Resp:    Temp:    SpO2: 96%     Musculoskeletal: Left Lower Extremity  · Skin warm, dry, tissues of the lower leg soft and compressible  · Mild edema to the foot  · Dressings clean, dry, intact  · Tender to palpation over the distal ankle  · Sensation intact  · Firing of EHL/EHL  · Toes warm and well perfused    Assessment:    26 y o male POD 2 left ankle spanning ex fix, doing well from orthopedic perspective  Patient will require definitive fixation in about 1 weeks time after swelling has gone down  Plan:  · NWB LLE  · No diagnosis of acute blood loss anemia, will monitor and administer IVF/prbc as indicated   · PT/OT  · Pain control  · DVT ppx - recommend lovenox or other now and for 28 days after internalization of fixation construct    · Dispo: Ortho will follow    Annette Marsh MD
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
## 2022-02-27 NOTE — RESPIRATORY THERAPY NOTE
resp care      02/27/22 0757   Respiratory Assessment   Resp Comments pt is not wearing cpap anymore, will D/C at this time and reorder if needed at a later time

## 2022-02-28 LAB
ANION GAP SERPL CALCULATED.3IONS-SCNC: 3 MMOL/L (ref 4–13)
BASOPHILS # BLD AUTO: 0.07 THOUSANDS/ΜL (ref 0–0.1)
BASOPHILS NFR BLD AUTO: 1 % (ref 0–1)
BUN SERPL-MCNC: 13 MG/DL (ref 5–25)
CALCIUM SERPL-MCNC: 8.9 MG/DL (ref 8.3–10.1)
CHLORIDE SERPL-SCNC: 107 MMOL/L (ref 100–108)
CO2 SERPL-SCNC: 28 MMOL/L (ref 21–32)
CREAT SERPL-MCNC: 0.94 MG/DL (ref 0.6–1.3)
EOSINOPHIL # BLD AUTO: 0.42 THOUSAND/ΜL (ref 0–0.61)
EOSINOPHIL NFR BLD AUTO: 4 % (ref 0–6)
ERYTHROCYTE [DISTWIDTH] IN BLOOD BY AUTOMATED COUNT: 13.6 % (ref 11.6–15.1)
GFR SERPL CREATININE-BSD FRML MDRD: 110 ML/MIN/1.73SQ M
GLUCOSE SERPL-MCNC: 141 MG/DL (ref 65–140)
GLUCOSE SERPL-MCNC: 144 MG/DL (ref 65–140)
GLUCOSE SERPL-MCNC: 176 MG/DL (ref 65–140)
GLUCOSE SERPL-MCNC: 191 MG/DL (ref 65–140)
GLUCOSE SERPL-MCNC: 252 MG/DL (ref 65–140)
HCT VFR BLD AUTO: 40.2 % (ref 36.5–49.3)
HGB BLD-MCNC: 12.6 G/DL (ref 12–17)
IMM GRANULOCYTES # BLD AUTO: 0.03 THOUSAND/UL (ref 0–0.2)
IMM GRANULOCYTES NFR BLD AUTO: 0 % (ref 0–2)
LYMPHOCYTES # BLD AUTO: 1.75 THOUSANDS/ΜL (ref 0.6–4.47)
LYMPHOCYTES NFR BLD AUTO: 19 % (ref 14–44)
MCH RBC QN AUTO: 27.5 PG (ref 26.8–34.3)
MCHC RBC AUTO-ENTMCNC: 31.3 G/DL (ref 31.4–37.4)
MCV RBC AUTO: 88 FL (ref 82–98)
MONOCYTES # BLD AUTO: 0.86 THOUSAND/ΜL (ref 0.17–1.22)
MONOCYTES NFR BLD AUTO: 9 % (ref 4–12)
NEUTROPHILS # BLD AUTO: 6.33 THOUSANDS/ΜL (ref 1.85–7.62)
NEUTS SEG NFR BLD AUTO: 67 % (ref 43–75)
NRBC BLD AUTO-RTO: 0 /100 WBCS
PLATELET # BLD AUTO: 254 THOUSANDS/UL (ref 149–390)
PMV BLD AUTO: 10.8 FL (ref 8.9–12.7)
POTASSIUM SERPL-SCNC: 4.1 MMOL/L (ref 3.5–5.3)
RBC # BLD AUTO: 4.59 MILLION/UL (ref 3.88–5.62)
SODIUM SERPL-SCNC: 138 MMOL/L (ref 136–145)
WBC # BLD AUTO: 9.46 THOUSAND/UL (ref 4.31–10.16)

## 2022-02-28 PROCEDURE — 80048 BASIC METABOLIC PNL TOTAL CA: CPT | Performed by: PHYSICIAN ASSISTANT

## 2022-02-28 PROCEDURE — 85025 COMPLETE CBC W/AUTO DIFF WBC: CPT | Performed by: PHYSICIAN ASSISTANT

## 2022-02-28 PROCEDURE — 82948 REAGENT STRIP/BLOOD GLUCOSE: CPT

## 2022-02-28 PROCEDURE — NC001 PR NO CHARGE: Performed by: ORTHOPAEDIC SURGERY

## 2022-02-28 PROCEDURE — 99232 SBSQ HOSP IP/OBS MODERATE 35: CPT | Performed by: INTERNAL MEDICINE

## 2022-02-28 PROCEDURE — 99232 SBSQ HOSP IP/OBS MODERATE 35: CPT | Performed by: SURGERY

## 2022-02-28 PROCEDURE — 97530 THERAPEUTIC ACTIVITIES: CPT

## 2022-02-28 RX ADMIN — CARVEDILOL 25 MG: 25 TABLET, FILM COATED ORAL at 17:34

## 2022-02-28 RX ADMIN — CARVEDILOL 25 MG: 25 TABLET, FILM COATED ORAL at 09:38

## 2022-02-28 RX ADMIN — SACUBITRIL AND VALSARTAN 1 TABLET: 97; 103 TABLET, FILM COATED ORAL at 17:34

## 2022-02-28 RX ADMIN — ACETAMINOPHEN 975 MG: 325 TABLET ORAL at 13:09

## 2022-02-28 RX ADMIN — ACETAMINOPHEN 975 MG: 325 TABLET ORAL at 05:30

## 2022-02-28 RX ADMIN — ENOXAPARIN SODIUM 60 MG: 60 INJECTION SUBCUTANEOUS at 09:45

## 2022-02-28 RX ADMIN — ATORVASTATIN CALCIUM 20 MG: 20 TABLET, FILM COATED ORAL at 09:40

## 2022-02-28 RX ADMIN — ENOXAPARIN SODIUM 60 MG: 60 INJECTION SUBCUTANEOUS at 21:49

## 2022-02-28 RX ADMIN — SACUBITRIL AND VALSARTAN 1 TABLET: 97; 103 TABLET, FILM COATED ORAL at 09:40

## 2022-02-28 RX ADMIN — DOCUSATE SODIUM 100 MG: 100 CAPSULE ORAL at 09:40

## 2022-02-28 RX ADMIN — ACETAMINOPHEN 975 MG: 325 TABLET ORAL at 21:49

## 2022-02-28 RX ADMIN — SPIRONOLACTONE 25 MG: 25 TABLET ORAL at 09:40
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
## 2022-02-28 NOTE — PLAN OF CARE
Problem: OCCUPATIONAL THERAPY ADULT  Goal: Performs self-care activities at highest level of function for planned discharge setting  See evaluation for individualized goals  Description: Treatment Interventions: ADL retraining,Functional transfer training,UE strengthening/ROM,Endurance training,Patient/family training,Equipment evaluation/education,Compensatory technique education,Continued evaluation,Activityengagement,Energy conservation          See flowsheet documentation for full assessment, interventions and recommendations  Outcome: Progressing  Note: Limitation: Decreased ADL status,Decreased Safe judgement during ADL,Decreased endurance,Decreased self-care trans,Decreased high-level ADLs  Prognosis: Good  Assessment: PT SEEN FOR OT TX SESSION WITH FOCUS ON LB DRESSING, FUNCTIONAL TXFS/MOBILITY, PT EDUCATION AND D/C PLANNING  PT'S GF PRESENT FOR SESSION  PT HAS DEMONSTRATED SIGNIFICANT IMPROVEMENT IN LB DRESSING INCLUDING DOFFING/DONNING B/L SOCKS TO MIN A LEVEL AS WELL AT TXFS AND FUNCTIONAL MOBILITY TO MIN A LEVEL WITH USE OF EVELIN RW  PT DEMONSTRATED G CARRY OVER OF LEARNED LLE NWB STATUS  PT AND S/O EDUCATED ON COMPENSATORY DRESSING TECHNIQUES, DME RECS, ENERGY CONSERVATION TECHNIQUES AND CURRENT ASSIST LEVELS  PT REPORTS HE IS ABLE TO HAVE A FFSU WITH SUPPORTIVE MOTHER AND S/O WHO ARE ABLE TO BE WITH HIM AT ALL TIMES TO ASSIST AS NEEDED  FROM AN OT PERSPECTIVE, CURRENT D/C REC FOR HOME WITH INCREASED FAMILY SUPPORT + EVELIN BSC /RW  PT IS PROGRESSING HOWEVER REMAINS LIMITED, WILL CONT TO FOLLOW  OT Discharge Recommendation: No rehabilitation needs (INCREASED FAMILY SUPPORT )  OT - OK to Discharge:  Yes

## 2022-02-28 NOTE — ASSESSMENT & PLAN NOTE
No results found for: HGBA1C    Recent Labs     02/27/22  1135 02/27/22  1708 02/27/22  2149 02/28/22  0726   POCGLU 142* 149* 158* 176*       Blood Sugar Average: Last 72 hrs:  (P) 228 [id number]     - continue sliding scale insulin  - close glucose control in the setting of orthopedic injury

## 2022-02-28 NOTE — PROGRESS NOTES
Progress Note - Orthopedics   Courtney Courts 31 y o  male MRN: [id number]  Unit/Bed#: [facility] 634-01      Subjective:    31 y o male POD 3 ex fi x L pilon fracture  Pt doing well  Pain controlled to left lower extremity   No complaints currently    Labs:  0   Lab Value Date/Time    HCT 39 7 02/27/2022 0553    HCT 38 1 02/26/2022 0522    HCT 41 2 02/25/2022 1308    HCT 40 02/25/2022 1043    HGB 12 5 02/27/2022 0553    HGB 11 9 (L) 02/26/2022 0522    HGB 12 7 02/25/2022 1308    HGB 13 6 02/25/2022 1043    INR 1 05 02/25/2022 0505    WBC 13 95 (H) 02/27/2022 0553    WBC 16 50 (H) 02/26/2022 0522    WBC 11 85 (H) 02/25/2022 1308       Meds:    Current Facility-Administered Medications:     acetaminophen (TYLENOL) tablet 975 mg, 975 mg, Oral, Q8H [facility], Shanon Castaneda MD, 975 mg at 02/28/22 0530    atorvastatin (LIPITOR) tablet 20 mg, 20 mg, Oral, Daily, Shanon Castaneda MD, 20 mg at 02/27/22 5069    calcium carbonate (TUMS) chewable tablet 500 mg, 500 mg, Oral, Daily PRN, Shanon Castaneda MD, 500 mg at 02/23/22 2328    carvedilol (COREG) tablet 25 mg, 25 mg, Oral, BID With Meals, Shanon Castaneda MD, 25 mg at 02/27/22 1800    docusate sodium (COLACE) capsule 100 mg, 100 mg, Oral, BID, Shanon Castaneda MD, 100 mg at 02/27/22 1759    enoxaparin (LOVENOX) subcutaneous injection 60 mg, 60 mg, Subcutaneous, Q12H [facility], Shanon Castaneda MD, 60 mg at 02/27/22 2103    hydrALAZINE (APRESOLINE) injection 5 mg, 5 mg, Intravenous, Q6H PRN, Jose Alfredo Bowen PA-C    HYDROmorphone (DILAUDID) injection 0 5 mg, 0 5 mg, Intravenous, Q4H PRN, Shanon Castaneda MD    insulin lispro (HumaLOG) 100 units/mL subcutaneous injection 2-12 Units, 2-12 Units, Subcutaneous, TID With Meals **AND** Fingerstick Glucose (POCT), , , 4x Daily AC and at bedtime, Jose Alfredo Bowen PA-C    oxyCODONE (ROXICODONE) immediate release tablet 10 mg, 10 mg, Oral, Q4H PRN, Shanon Castaneda MD, 10 mg at 02/27/22 0308    oxyCODONE (ROXICODONE) IR tablet 5 mg, 5 mg, Oral, Q4H PRN, Jamir Mancera MD, 5 mg at 02/26/22 1810    polyethylene glycol (MIRALAX) packet 17 g, 17 g, Oral, Daily, Jamir Mancera MD, 17 g at 02/26/22 0829    sacubitril-valsartan (ENTRESTO)  MG per tablet 1 tablet, 1 tablet, Oral, BID, Kannan Hart DO, 1 tablet at 02/27/22 1759    spironolactone (ALDACTONE) tablet 25 mg, 25 mg, Oral, Daily, Carroll Arthur PA-C, 25 mg at 02/27/22 1417    Blood Culture:   No results found for: BLOODCX    Wound Culture:   No results found for: WOUNDCULT    Ins and Outs:  I/O last 24 hours: In: 1125 [P O :1125]  Out: 2800 [Urine:2800]          Physical:  Vitals:    02/28/22 0625   BP: 113/79   Pulse: 78   Resp: 18   Temp: 98 5 °F (36 9 °C)   SpO2: (!) 87%     Musculoskeletal: Left Lower Extremity  · Skin warm, dry, tissues of the lower leg soft and compressible  · Mild swelling to the foot  · Dressings and pin sites clean, dry, intact  · Tender to palpation over the distal ankle  · Sensation intact  · Firing of EHL/EHL  · Toes warm and well perfused    Assessment:    26 y o male POD 3 left ankle spanning ex fix, doing well from orthopedic perspective  Plan:  · NWB LLE  · Continue aggressive ice and elevation  · Will plan for OR later this week once swelling decreases  · No diagnosis of acute blood loss anemia, will monitor and administer IVF/prbc as indicated   · PT/OT  · Pain control  · DVT ppx - recommend lovenox or other now and for 28 days after internalization of fixation construct    · Dispo: Ortho will follow    Karmen Bhat MD

## 2022-02-28 NOTE — UTILIZATION REVIEW
Continued Stay Review    Date: 02/26/2022, 02/27/2022, 02/28/2022                       Current Patient Class: Inpatient  Current Level of Care: Med Surg    HPI:26 y o  male initially admitted on 02/23/2022     Assessment/Plan:   02/26  Progress Notes: No acute overnight events, extubated successfully post-op and weaned from pressors    Pain well-controlled  continue to encourage IS and pulm toilet  Diet as tolerated  PT/OT  dvt ppx   Hb stable    Ortho Notes: POD 1 left ankle spanning ex fix  NWB LLE  No diagnosis of acute blood loss anemia, will monitor and administer IVF/prbc as indicated  PT/OT  Pain control  DVT ppx  Definitive fixation is planned for Friday, 3/4      02/27 Ortho Notes: POD 2 ex fi x L pilon fracture  Pt doing well  Pain controlled to left lower extremity  Patient will require definitive fixation in about 1 weeks time after swelling has gone down  Cont NWB LLE  PT/OT, pain control, DVT ppx  LLE dressings c/d/i  Tender to palpation over the distal ankle  Mild edema to the foot  Sensation intact  02/28 Ortho Notes: POD 3 ex fi x L pilon fracture  Pt doing well  Pain controlled to left lower extremity  Return to OR Thursday for definitive fixation  NWB LLE  Continue aggressive ice and elevation  PT/OT  Pain control   DVT ppx       Vital Signs:   Date/Time Temp Pulse Resp BP MAP (mmHg) SpO2 O2 Device   02/28/22 1400 98 2 °F (36 8 °C) 100 18 124/80 -- 92 % --   02/28/22 0938 98 1 °F (36 7 °C) 101 18 130/84 -- 92 % --   02/28/22 06:25:11 98 5 °F (36 9 °C) 78 18 113/79 90 91 % --   02/28/22 02:16:04 98 °F (36 7 °C) 67 20 141/92 108 93 % --   02/27/22 23:28:19 98 6 °F (37 °C) 78 -- 104/78 87 92 % --   02/27/22 19:30:40 99 6 °F (37 6 °C) 87 16 167/78 108 93 % --   02/27/22 1800 -- 92 -- -- -- -- --   02/27/22 16:14:45 98 1 °F (36 7 °C) 90 16 148/92 111 94 % None (Room air)   02/27/22 1417 -- 88 -- 156/95 115 92 % --   02/27/22 12:45:32 -- 95 -- 156/96 116 93 % --   02/27/22 10:02:30 98 2 °F (36 8 °C) 99 20 170/84 -- 95 % --   02/27/22 0851 -- -- -- -- -- -- None (Room air)   02/27/22 08:48:38 -- -- -- 176/101 Abnormal  126 -- --   02/27/22 0750 98 2 °F (36 8 °C) 84 20 160/96 -- 97 % --       Date/Time Temp Pulse Resp BP MAP (mmHg) SpO2 O2 Device   02/27/22 0739 98 2 °F (36 8 °C) 80 22 140/80 -- 96 % --   02/26/22 2130 -- -- -- -- -- -- None (Room air)   02/26/22 1611 -- -- -- -- -- -- None (Room air)   02/26/22 16:04:30 -- 80 -- 143/76 98 96 % --   02/26/22 1500 -- 76 26 Abnormal  145/86 103 95 % --   02/26/22 1400 -- 82 -- 129/87 102 97 % --   02/26/22 1200 -- 80 23 Abnormal  116/69 84 98 % --   02/26/22 1100 -- 74 22 168/82 123 96 % --   02/26/22 1000 -- 84 24 Abnormal  149/77 106 99 % --   02/26/22 0945 -- 80 28 Abnormal  138/73 95 98 % --   02/26/22 0830 -- 88 -- 169/79 -- -- --   02/26/22 0800 98 4 °F (36 9 °C) 66 21 -- -- 99 % --   02/26/22 0600 -- 76 19 -- -- 99 % --   02/26/22 0505 97 8 °F (36 6 °C) 74 21 134/80 101 97 % --   02/26/22 0400 -- 72 28 Abnormal  -- -- 97 % --   02/26/22 0300 -- 64 18 119/67 89 99 % --   02/26/22 0200 -- 74 24 Abnormal  -- -- 98 % --   02/26/22 0100 -- 68 16 103/74 86 96 % --   02/26/22 0000 98 °F (36 7 °C) 74 24 Abnormal  -- -- 91 % --       Pertinent Labs/Diagnostic Results:   Results from last 7 days   Lab Units 02/25/22  0020   SARS-COV-2  Negative     Results from last 7 days   Lab Units 02/28/22  0738 02/27/22  0553 02/26/22  0522 02/25/22  1308 02/25/22  1308 02/25/22  1043 02/25/22  0505 02/25/22  0505 02/23/22  1501   WBC Thousand/uL 9 46 13 95* 16 50*   < > 11 85*  --    < > 7 81  --    HEMOGLOBIN g/dL 12 6 12 5 11 9*  --  12 7  --   --  12 5  --    I STAT HEMOGLOBIN g/dl  --   --   --   --   --  13 6  --   --    < >   HEMATOCRIT % 40 2 39 7 38 1  --  41 2  --   --  40 8  --    HEMATOCRIT, ISTAT %  --   --   --   --   --  40  --   --    < >   PLATELETS Thousands/uL 254 258 248   < > 261  --    < > 254  --    NEUTROS ABS Thousands/µL 6 33 11 05*  --   -- --   --   --  5 07  --     < > = values in this interval not displayed  Results from last 7 days   Lab Units 02/28/22  0738 02/27/22  0916 02/27/22  0553 02/26/22  0522 02/25/22  1308 02/25/22  1043 02/25/22  0505 02/25/22  0505 02/24/22  0526 02/24/22  0526 02/23/22  1501   SODIUM mmol/L 138 140 135* 137 138  --    < > 140   < > 139  --    POTASSIUM mmol/L 4 1 4 2 5 6* 4 5 4 6  --    < > 4 4   < > 4 3  --    CHLORIDE mmol/L 107 109* 109* 104 108  --    < > 108   < > 107  --    CO2 mmol/L 28 27 21 27 27  --    < > 28   < > 27  --    CO2, I-STAT mmol/L  --   --   --   --   --  29  --   --   --   --    < >   ANION GAP mmol/L 3* 4 5 6 3*  --    < > 4   < > 5  --    BUN mg/dL 13 19 23 23 14  --    < > 11   < > 10  --    CREATININE mg/dL 0 94 1 23 1 10 1 42* 1 32*  --    < > 1 06   < > 1 01  --    EGFR ml/min/1 73sq m 110 79 91 67 73  --    < > 95   < > 101  --    CALCIUM mg/dL 8 9 8 9 8 8 8 8 8 3  --    < > 8 6   < > 8 8  --    CALCIUM, IONIZED, ISTAT mmol/L  --   --   --   --   --  1 18  --   --   --   --   --    MAGNESIUM mg/dL  --   --   --  2 6 2 3  --   --  2 6  --  2 6  --    PHOSPHORUS mg/dL  --   --   --  4 2 2 6*  --   --  3 7  --   --   --     < > = values in this interval not displayed           Results from last 7 days   Lab Units 02/28/22  1125 02/28/22  0726 02/27/22  2149 02/27/22  1708 02/27/22  1135 02/27/22  0537 02/27/22  0018 02/26/22  2047 02/26/22  1731 02/26/22  1148 02/26/22  0631 02/26/22  0021   POC GLUCOSE mg/dl 252* 176* 158* 149* 142* 114 165* 193* 223* 147* 181* 154*     Results from last 7 days   Lab Units 02/28/22  0738 02/27/22  0916 02/27/22  0553 02/26/22  0522 02/25/22  1308 02/25/22  0505 02/24/22  0526 02/23/22  1502   GLUCOSE RANDOM mg/dL 144* 146* 98 160* 180* 107 116 163*     Results from last 7 days   Lab Units 02/25/22  1313   PH ART  7 342*   PCO2 ART mm Hg 50 8*   PO2 ART mm Hg 78 2   HCO3 ART mmol/L 26 9   BASE EXC ART mmol/L 0 4   O2 CONTENT ART mL/dL 18 3   O2 HGB, ARTERIAL % 94 8   ABG SOURCE  Line, Arterial         Results from last 7 days   Lab Units 02/25/22  1043 02/23/22  1501   PH, JAVI I-STAT   --  7 374   PCO2, JAVI ISTAT mm HG  --  49 6   PO2, JAVI ISTAT mm HG  --  32 0*   HCO3, JAVI ISTAT mmol/L  --  29 0   I STAT BASE EXC mmol/L -3* 3   I STAT O2 SAT % 91* 58*   ISTAT PH ART  7 167*  --    I STAT ART PCO2 mm HG 74 6*  --    I STAT ART PO2 mm HG 80 0  --    I STAT ART HCO3 mmol/L 27 0  --          Results from last 7 days   Lab Units 02/23/22  2141 02/23/22  1654 02/23/22  1502   HS TNI 0HR ng/L  --   --  78*   HS TNI 2HR ng/L  --  86*  --    HSTNI D2 ng/L  --  8  --    HS TNI 4HR ng/L 94*  --   --    HSTNI D4 ng/L 16  --   --          Results from last 7 days   Lab Units 02/25/22  0505 02/24/22  0526 02/23/22  1601   PROTIME seconds 13 3 13 4 12 9   INR  1 05 1 06 1 01   PTT seconds 32 26 26             Results from last 7 days   Lab Units 02/25/22  1308   LACTIC ACID mmol/L 1 6     Results from last 7 days   Lab Units 02/25/22  0020   INFLUENZA A PCR  Negative   INFLUENZA B PCR  Negative   RSV PCR  Negative       Medications:   Scheduled Medications:  acetaminophen, 975 mg, Oral, Q8H TREASURE  atorvastatin, 20 mg, Oral, Daily  carvedilol, 25 mg, Oral, BID With Meals  docusate sodium, 100 mg, Oral, BID  enoxaparin, 60 mg, Subcutaneous, Q12H TREASURE  insulin lispro, 2-12 Units, Subcutaneous, TID With Meals  polyethylene glycol, 17 g, Oral, Daily  sacubitril-valsartan, 1 tablet, Oral, BID  spironolactone, 25 mg, Oral, Daily      Continuous IV Infusions:     PRN Meds:  calcium carbonate, 500 mg, Oral, Daily PRN  hydrALAZINE, 5 mg, Intravenous, Q6H PRN  HYDROmorphone, 0 5 mg, Intravenous, Q4H PRN  oxyCODONE, 10 mg, Oral, Q4H PRN 02/27 x 1  oxyCODONE, 5 mg, Oral, Q4H PRN 02/26 x 1        Discharge Plan: TBD    Network Utilization Review Department  ATTENTION: Please call with any questions or concerns to [phone] and carefully listen to the prompts so that you are directed to the right person  All voicemails are confidential   Alycia Romain all requests for admission clinical reviews, approved or denied determinations and any other requests to dedicated fax number below belonging to the campus where the patient is receiving treatment   List of dedicated fax numbers for the Facilities:  [address] DENIALS (Administrative/Medical Necessity) [phone]   [address] (Maternity/NICU/Pediatrics) [phone]   [address] 40 125 [facility]  [address] Medical [place] [address] [address] [place] [address] [address] [facility] [place] [address] [phone]

## 2022-02-28 NOTE — ASSESSMENT & PLAN NOTE
- left tibial fracture, present on admission   - s/p ex-fix on 2/25  - Appreciate Orthopedic surgery evaluation, recommendations and interventions as noted  - Maintain non weightbearing status on the left lower extremity in splint   - Monitor left lower extremity neurovascular exam   - Continue multimodal analgesic regimen   - Continue DVT prophylaxis; 60 mg b i d   - PT and OT evaluation and treatment as indicated    - plan for operative fixation on 03/03/2022

## 2022-02-28 NOTE — PROGRESS NOTES
Cardiology Progress Note - Gregorio Henriquez 31 y o  male MRN: [id number]    Unit/Bed#: 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:  Ejection fraction 20-25%  Continued on medical therapy with beta-blocker and Entresto  Also continued on spironolactone  Stable and euvolemic on exam   ICD was offered by primary cardiologist, patient has declined  2  Left tibia fracture:  Status post external fixation device, postop day 3  Stable and tolerated surgery well  3  Hypertension:  Well controlled, continue current regimen  4  Morbid obesity  5  Dyslipidemia:  Continued on statin  6  Type 2 diabetes mellitus:  As per primary team   7  UBALDO:  On CPAP  8  A KI:  Improved  9  No further cardiac recommendations at the current time  Continue current regimen  Please call with questions  Subjective:   Patient seen and examined  No significant events overnight   ; pertinent negatives - chest pain, chest pressure/discomfort, dyspnea, irregular heart beat, lower extremity edema and palpitations  Objective:     Vitals: Blood pressure 113/79, pulse 78, temperature 98 5 °F (36 9 °C), resp   rate 18, height 5' 10" (1 778 m), weight (!) 160 kg (352 lb 11 8 oz), SpO2 91 % , Body mass index is 50 61 kg/m² ,   Orthostatic Blood Pressures      Most Recent Value   Blood Pressure 113/79 filed at 02/28/2022 4008   Patient Position - Orthostatic VS Lying filed at 02/23/2022 1715            Intake/Output Summary (Last 24 hours) at 2/28/2022 0920  Last data filed at 2/28/2022 0216  Gross per 24 hour   Intake 500 ml   Output 2500 ml   Net -2000 ml       Physical Exam:    GEN: Gregorio Henriquez appears well, alert and oriented x 3, pleasant and cooperative   HEENT: pupils equal, round, and reactive to light; extraocular muscles intact  NECK: supple, no carotid bruits   HEART: regular rhythm, normal S1 and S2, no murmurs, clicks, gallops or rubs LUNGS: clear to auscultation bilaterally; no wheezes, rales, or rhonchi   ABDOMEN: normal bowel sounds, soft, no tenderness, no distention  EXTREMITIES: peripheral pulses normal; no clubbing, cyanosis, or edema  NEURO: no focal findings   SKIN: normal without suspicious lesions on exposed skin    Medications:      Current Facility-Administered Medications:     acetaminophen (TYLENOL) tablet 975 mg, 975 mg, Oral, Q8H [address], Cassandra Mesa MD, 975 mg at 02/28/22 0530    atorvastatin (LIPITOR) tablet 20 mg, 20 mg, Oral, Daily, Cassandra Mesa MD, 20 mg at 02/27/22 6176    calcium carbonate (TUMS) chewable tablet 500 mg, 500 mg, Oral, Daily PRN, Cassandra Mesa MD, 500 mg at 02/23/22 2328    carvedilol (COREG) tablet 25 mg, 25 mg, Oral, BID With Meals, Cassandra Mesa MD, 25 mg at 02/27/22 1800    docusate sodium (COLACE) capsule 100 mg, 100 mg, Oral, BID, Cassandra Mesa MD, 100 mg at 02/27/22 1759    enoxaparin (LOVENOX) subcutaneous injection 60 mg, 60 mg, Subcutaneous, Q12H [address], Cassandra Mesa MD, 60 mg at 02/27/22 2103    hydrALAZINE (APRESOLINE) injection 5 mg, 5 mg, Intravenous, Q6H PRN, Nadiya Ko PA-C    HYDROmorphone (DILAUDID) injection 0 5 mg, 0 5 mg, Intravenous, Q4H PRN, Cassandra Mesa MD    insulin lispro (HumaLOG) 100 units/mL subcutaneous injection 2-12 Units, 2-12 Units, Subcutaneous, TID With Meals **AND** Fingerstick Glucose (POCT), , , 4x Daily AC and at bedtime, Nadiya Ko PA-C    oxyCODONE (ROXICODONE) immediate release tablet 10 mg, 10 mg, Oral, Q4H PRN, Cassandra Mesa MD, 10 mg at 02/27/22 0308    oxyCODONE (ROXICODONE) IR tablet 5 mg, 5 mg, Oral, Q4H PRN, Cassandra Mesa MD, 5 mg at 02/26/22 1810    polyethylene glycol (MIRALAX) packet 17 g, 17 g, Oral, Daily, Cassandra Mesa MD, 17 g at 02/26/22 0829    sacubitril-valsartan (ENTRESTO)  MG per tablet 1 tablet, 1 tablet, Oral, BID, Dany Szymanski DO, 1 tablet at 02/27/22 1759    spironolactone (ALDACTONE) tablet 25 mg, 25 mg, Oral, Daily, Cynthia Mercado PA-C, 25 mg at 02/27/22 1417     Labs & Results:        Results from last 7 days   Lab Units 02/28/22  0738 02/27/22  0553 02/26/22  0522   WBC Thousand/uL 9 46 13 95* 16 50*   HEMOGLOBIN g/dL 12 6 12 5 11 9*   HEMATOCRIT % 40 2 39 7 38 1   PLATELETS Thousands/uL 254 258 248         Results from last 7 days   Lab Units 02/28/22  0738 02/27/22  0916 02/27/22  0553 02/25/22  1308 02/25/22  1043 02/23/22  1502 02/23/22  1501   POTASSIUM mmol/L 4 1 4 2 5 6*   < >  --    < >  --    CHLORIDE mmol/L 107 109* 109*   < >  --    < >  --    CO2 mmol/L 28 27 21   < >  --    < >  --    CO2, I-STAT mmol/L  --   --   --   --  29   < > 30   BUN mg/dL 13 19 23   < >  --    < >  --    CREATININE mg/dL 0 94 1 23 1 10   < >  --    < >  --    CALCIUM mg/dL 8 9 8 9 8 8   < >  --    < >  --    GLUCOSE, ISTAT mg/dl  --   --   --   --  204*  --  166*    < > = values in this interval not displayed       Results from last 7 days   Lab Units 02/25/22  0505 02/24/22  0526 02/23/22  1601   INR  1 05 1 06 1 01   PTT seconds 32 26 26     Results from last 7 days   Lab Units 02/26/22  0522 02/25/22  1308 02/25/22  0505   MAGNESIUM mg/dL 2 6 2 3 2 6         EKG personally reviewed by Noni Cabot, MD
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
## 2022-02-28 NOTE — ASSESSMENT & PLAN NOTE
Wt Readings from Last 3 Encounters:   02/23/22 (!) 160 kg (352 lb 11 8 oz)     - EF baseline is 20-25%  - cardiology following, signing off on 02/28/2020  - outpatient follow-up with Cardiology  - no other acute workup at this time  - will need to resume all CHF medications on 02/27  - patiently currently taking Coreg, Entresto, spironolactone

## 2022-03-01 LAB
GLUCOSE SERPL-MCNC: 127 MG/DL (ref 65–140)
GLUCOSE SERPL-MCNC: 140 MG/DL (ref 65–140)
GLUCOSE SERPL-MCNC: 180 MG/DL (ref 65–140)
GLUCOSE SERPL-MCNC: 202 MG/DL (ref 65–140)

## 2022-03-01 PROCEDURE — 82948 REAGENT STRIP/BLOOD GLUCOSE: CPT

## 2022-03-01 PROCEDURE — 99232 SBSQ HOSP IP/OBS MODERATE 35: CPT | Performed by: SURGERY

## 2022-03-01 PROCEDURE — 97116 GAIT TRAINING THERAPY: CPT

## 2022-03-01 PROCEDURE — 97530 THERAPEUTIC ACTIVITIES: CPT

## 2022-03-01 RX ADMIN — SPIRONOLACTONE 25 MG: 25 TABLET ORAL at 09:45

## 2022-03-01 RX ADMIN — ATORVASTATIN CALCIUM 20 MG: 20 TABLET, FILM COATED ORAL at 09:45

## 2022-03-01 RX ADMIN — ENOXAPARIN SODIUM 60 MG: 60 INJECTION SUBCUTANEOUS at 09:46

## 2022-03-01 RX ADMIN — CARVEDILOL 25 MG: 25 TABLET, FILM COATED ORAL at 17:07

## 2022-03-01 RX ADMIN — SACUBITRIL AND VALSARTAN 1 TABLET: 97; 103 TABLET, FILM COATED ORAL at 09:46

## 2022-03-01 RX ADMIN — ACETAMINOPHEN 975 MG: 325 TABLET ORAL at 13:05

## 2022-03-01 RX ADMIN — ACETAMINOPHEN 975 MG: 325 TABLET ORAL at 06:02

## 2022-03-01 RX ADMIN — SACUBITRIL AND VALSARTAN 1 TABLET: 97; 103 TABLET, FILM COATED ORAL at 17:06

## 2022-03-01 RX ADMIN — ENOXAPARIN SODIUM 60 MG: 60 INJECTION SUBCUTANEOUS at 21:29

## 2022-03-01 RX ADMIN — CARVEDILOL 25 MG: 25 TABLET, FILM COATED ORAL at 09:45

## 2022-03-01 RX ADMIN — ACETAMINOPHEN 975 MG: 325 TABLET ORAL at 21:29

## 2022-03-01 NOTE — PLAN OF CARE
Problem: MOBILITY - ADULT  Goal: Maintain or return to baseline ADL function  Description: INTERVENTIONS:  -  Assess patient's ability to carry out ADLs; assess patient's baseline for ADL function and identify physical deficits which impact ability to perform ADLs (bathing, care of mouth/teeth, toileting, grooming, dressing, etc )  - Assess/evaluate cause of self-care deficits   - Assess range of motion  - Assess patient's mobility; develop plan if impaired  - Assess patient's need for assistive devices and provide as appropriate  - Encourage maximum independence but intervene and supervise when necessary  - Involve family in performance of ADLs  - Assess for home care needs following discharge   - Consider OT consult to assist with ADL evaluation and planning for discharge  - Provide patient education as appropriate  Outcome: Progressing  Goal: Maintains/Returns to pre admission functional level  Description: INTERVENTIONS:  - Perform BMAT or MOVE assessment daily    - Set and communicate daily mobility goal to care team and patient/family/caregiver     - Collaborate with rehabilitation services on mobility goals if consulted  - Out of bed for toileting  - Record patient progress and toleration of activity level   Outcome: Progressing     Problem: PAIN - ADULT  Goal: Verbalizes/displays adequate comfort level or baseline comfort level  Description: Interventions:  - Encourage patient to monitor pain and request assistance  - Assess pain using appropriate pain scale  - Administer analgesics based on type and severity of pain and evaluate response  - Implement non-pharmacological measures as appropriate and evaluate response  - Consider cultural and social influences on pain and pain management  - Notify physician/advanced practitioner if interventions unsuccessful or patient reports new pain  Outcome: Progressing     Problem: INFECTION - ADULT  Goal: Absence or prevention of progression during hospitalization  Description: INTERVENTIONS:  - Assess and monitor for signs and symptoms of infection  - Monitor lab/diagnostic results  - Monitor all insertion sites, i e  indwelling lines, tubes, and drains  - [place] appropriate cooling/warming therapies per order  - Administer medications as ordered  - Instruct and encourage patient and family to use good hand hygiene technique  - Identify and instruct in appropriate isolation precautions for identified infection/condition  Outcome: Progressing  Goal: Absence of fever/infection during neutropenic period  Description: INTERVENTIONS:  - Monitor WBC    Outcome: Progressing     Problem: SAFETY ADULT  Goal: Maintain or return to baseline ADL function  Description: INTERVENTIONS:  -  Assess patient's ability to carry out ADLs; assess patient's baseline for ADL function and identify physical deficits which impact ability to perform ADLs (bathing, care of mouth/teeth, toileting, grooming, dressing, etc )  - Assess/evaluate cause of self-care deficits   - Assess range of motion  - Assess patient's mobility; develop plan if impaired  - Assess patient's need for assistive devices and provide as appropriate  - Encourage maximum independence but intervene and supervise when necessary  - Involve family in performance of ADLs  - Assess for home care needs following discharge   - Consider OT consult to assist with ADL evaluation and planning for discharge  - Provide patient education as appropriate  Outcome: Progressing  Goal: Maintains/Returns to pre admission functional level  Description: INTERVENTIONS:  - Perform BMAT or MOVE assessment daily    - Set and communicate daily mobility goal to care team and patient/family/caregiver     - Collaborate with rehabilitation services on mobility goals if consulted  - Out of bed for toileting  - Record patient progress and toleration of activity level   Outcome: Progressing  Goal: Patient will remain free of falls  Description: INTERVENTIONS:  - Educate patient/family on patient safety including physical limitations  - Instruct patient to call for assistance with activity   - Consult OT/PT to assist with strengthening/mobility   - Keep Call bell within reach  - Keep bed low and locked with side rails adjusted as appropriate  - Keep care items and personal belongings within reach  - Initiate and maintain comfort rounds  - Make Fall Risk Sign visible to staff  - Offer Toileting every 2 Hours, in advance of need  - Initiate/Maintain bed alarm  - Obtain necessary fall risk management equipment  - Apply yellow socks and bracelet for high fall risk patients  - Consider moving patient to room near nurses station  Outcome: Progressing     Problem: DISCHARGE PLANNING  Goal: Discharge to home or other facility with appropriate resources  Description: INTERVENTIONS:  - Identify barriers to discharge w/patient and caregiver  - Arrange for needed discharge resources and transportation as appropriate  - Identify discharge learning needs (meds, wound care, etc )  - Arrange for interpretive services to assist at discharge as needed  - Refer to Case Management Department for coordinating discharge planning if the patient needs post-hospital services based on physician/advanced practitioner order or complex needs related to functional status, cognitive ability, or social support system  Outcome: Progressing     Problem: Knowledge Deficit  Goal: Patient/family/caregiver demonstrates understanding of disease process, treatment plan, medications, and discharge instructions  Description: Complete learning assessment and assess knowledge base    Interventions:  - Provide teaching at level of understanding  - Provide teaching via preferred learning methods  Outcome: Progressing     Problem: Prexisting or High Potential for Compromised Skin Integrity  Goal: Skin integrity is maintained or improved  Description: INTERVENTIONS:  - Identify patients at risk for skin breakdown  - Assess and monitor skin integrity  - Assess and monitor nutrition and hydration status  - Monitor labs   - Assess for incontinence   - Turn and reposition patient  - Assist with mobility/ambulation  - Relieve pressure over bony prominences  - Avoid friction and shearing  - Provide appropriate hygiene as needed including keeping skin clean and dry  - Evaluate need for skin moisturizer/barrier cream  - Collaborate with interdisciplinary team   - Patient/family teaching  - Consider wound care consult   Outcome: Progressing     Problem: MUSCULOSKELETAL - ADULT  Goal: Maintain or return mobility to safest level of function  Description: INTERVENTIONS:  - Assess patient's ability to carry out ADLs; assess patient's baseline for ADL function and identify physical deficits which impact ability to perform ADLs (bathing, care of mouth/teeth, toileting, grooming, dressing, etc )  - Assess/evaluate cause of self-care deficits   - Assess range of motion  - Assess patient's mobility  - Assess patient's need for assistive devices and provide as appropriate  - Encourage maximum independence but intervene and supervise when necessary  - Involve family in performance of ADLs  - Assess for home care needs following discharge   - Consider OT consult to assist with ADL evaluation and planning for discharge  - Provide patient education as appropriate  Outcome: Progressing  Goal: Maintain proper alignment of affected body part  Description: INTERVENTIONS:  - Support, maintain and protect limb and body alignment  - Provide patient/ family with appropriate education  Outcome: Progressing     Problem: Potential for Falls  Goal: Patient will remain free of falls  Description: INTERVENTIONS:  - Educate patient/family on patient safety including physical limitations  - Instruct patient to call for assistance with activity   - Consult OT/PT to assist with strengthening/mobility   - Keep Call bell within reach  - Keep bed low and locked with side rails adjusted as appropriate  - Keep care items and personal belongings within reach  - Initiate and maintain comfort rounds  - Make Fall Risk Sign visible to staff  - Offer Toileting every 2 Hours, in advance of need  - Initiate/Maintain bed alarm  - Obtain necessary fall risk management equipment  - Apply yellow socks and bracelet for high fall risk patients  - Consider moving patient to room near nurses station  Outcome: Progressing     Problem: Nutrition/Hydration-ADULT  Goal: Nutrient/Hydration intake appropriate for improving, restoring or maintaining nutritional needs  Description: Monitor and assess patient's nutrition/hydration status for malnutrition  Collaborate with interdisciplinary team and initiate plan and interventions as ordered  Monitor patient's weight and dietary intake as ordered or per policy  Utilize nutrition screening tool and intervene as necessary  Determine patient's food preferences and provide high-protein, high-caloric foods as appropriate       INTERVENTIONS:  - Monitor oral intake, urinary output, labs, and treatment plans  - Assess nutrition and hydration status and recommend course of action  - Evaluate amount of meals eaten  - Assist patient with eating if necessary   - Allow adequate time for meals  - Recommend/ encourage appropriate diets, oral nutritional supplements, and vitamin/mineral supplements  - Order, calculate, and assess calorie counts as needed  - Recommend, monitor, and adjust tube feedings and TPN/PPN based on assessed needs  - Assess need for intravenous fluids  - Provide specific nutrition/hydration education as appropriate  - Include patient/family/caregiver in decisions related to nutrition  Outcome: Progressing
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
## 2022-03-01 NOTE — PLAN OF CARE
Problem: MOBILITY - ADULT  Goal: Maintain or return to baseline ADL function  Description: INTERVENTIONS:  -  Assess patient's ability to carry out ADLs; assess patient's baseline for ADL function and identify physical deficits which impact ability to perform ADLs (bathing, care of mouth/teeth, toileting, grooming, dressing, etc )  - Assess/evaluate cause of self-care deficits   - Assess range of motion  - Assess patient's mobility; develop plan if impaired  - Assess patient's need for assistive devices and provide as appropriate  - Encourage maximum independence but intervene and supervise when necessary  - Involve family in performance of ADLs  - Assess for home care needs following discharge   - Consider OT consult to assist with ADL evaluation and planning for discharge  - Provide patient education as appropriate  Outcome: Progressing  Goal: Maintains/Returns to pre admission functional level  Description: INTERVENTIONS:  - Perform BMAT or MOVE assessment daily    - Set and communicate daily mobility goal to care team and patient/family/caregiver     - Collaborate with rehabilitation services on mobility goals if consulted  - Out of bed for toileting  - Record patient progress and toleration of activity level   Outcome: Progressing     Problem: PAIN - ADULT  Goal: Verbalizes/displays adequate comfort level or baseline comfort level  Description: Interventions:  - Encourage patient to monitor pain and request assistance  - Assess pain using appropriate pain scale  - Administer analgesics based on type and severity of pain and evaluate response  - Implement non-pharmacological measures as appropriate and evaluate response  - Consider cultural and social influences on pain and pain management  - Notify physician/advanced practitioner if interventions unsuccessful or patient reports new pain  Outcome: Progressing     Problem: INFECTION - ADULT  Goal: Absence or prevention of progression during hospitalization  Description: INTERVENTIONS:  - Assess and monitor for signs and symptoms of infection  - Monitor lab/diagnostic results  - Monitor all insertion sites, i e  indwelling lines, tubes, and drains  - [place] appropriate cooling/warming therapies per order  - Administer medications as ordered  - Instruct and encourage patient and family to use good hand hygiene technique  - Identify and instruct in appropriate isolation precautions for identified infection/condition  Outcome: Progressing  Goal: Absence of fever/infection during neutropenic period  Description: INTERVENTIONS:  - Monitor WBC    Outcome: Progressing     Problem: SAFETY ADULT  Goal: Maintain or return to baseline ADL function  Description: INTERVENTIONS:  -  Assess patient's ability to carry out ADLs; assess patient's baseline for ADL function and identify physical deficits which impact ability to perform ADLs (bathing, care of mouth/teeth, toileting, grooming, dressing, etc )  - Assess/evaluate cause of self-care deficits   - Assess range of motion  - Assess patient's mobility; develop plan if impaired  - Assess patient's need for assistive devices and provide as appropriate  - Encourage maximum independence but intervene and supervise when necessary  - Involve family in performance of ADLs  - Assess for home care needs following discharge   - Consider OT consult to assist with ADL evaluation and planning for discharge  - Provide patient education as appropriate  Outcome: Progressing  Goal: Maintains/Returns to pre admission functional level  Description: INTERVENTIONS:  - Perform BMAT or MOVE assessment daily    - Set and communicate daily mobility goal to care team and patient/family/caregiver     - Collaborate with rehabilitation services on mobility goals if consulted  - Out of bed for toileting  - Record patient progress and toleration of activity level   Outcome: Progressing  Goal: Patient will remain free of falls  Description: INTERVENTIONS:  - Educate patient/family on patient safety including physical limitations  - Instruct patient to call for assistance with activity   - Consult OT/PT to assist with strengthening/mobility   - Keep Call bell within reach  - Keep bed low and locked with side rails adjusted as appropriate  - Keep care items and personal belongings within reach  - Initiate and maintain comfort rounds  - Make Fall Risk Sign visible to staff  - Offer Toileting every 2 Hours, in advance of need  - Initiate/Maintain bed alarm  - Obtain necessary fall risk management equipment  - Apply yellow socks and bracelet for high fall risk patients  - Consider moving patient to room near nurses station  Outcome: Progressing     Problem: DISCHARGE PLANNING  Goal: Discharge to home or other facility with appropriate resources  Description: INTERVENTIONS:  - Identify barriers to discharge w/patient and caregiver  - Arrange for needed discharge resources and transportation as appropriate  - Identify discharge learning needs (meds, wound care, etc )  - Arrange for interpretive services to assist at discharge as needed  - Refer to Case Management Department for coordinating discharge planning if the patient needs post-hospital services based on physician/advanced practitioner order or complex needs related to functional status, cognitive ability, or social support system  Outcome: Progressing     Problem: Knowledge Deficit  Goal: Patient/family/caregiver demonstrates understanding of disease process, treatment plan, medications, and discharge instructions  Description: Complete learning assessment and assess knowledge base    Interventions:  - Provide teaching at level of understanding  - Provide teaching via preferred learning methods  Outcome: Progressing     Problem: Prexisting or High Potential for Compromised Skin Integrity  Goal: Skin integrity is maintained or improved  Description: INTERVENTIONS:  - Identify patients at risk for skin breakdown  - Assess and monitor skin integrity  - Assess and monitor nutrition and hydration status  - Monitor labs   - Assess for incontinence   - Turn and reposition patient  - Assist with mobility/ambulation  - Relieve pressure over bony prominences  - Avoid friction and shearing  - Provide appropriate hygiene as needed including keeping skin clean and dry  - Evaluate need for skin moisturizer/barrier cream  - Collaborate with interdisciplinary team   - Patient/family teaching  - Consider wound care consult   Outcome: Progressing     Problem: MUSCULOSKELETAL - ADULT  Goal: Maintain or return mobility to safest level of function  Description: INTERVENTIONS:  - Assess patient's ability to carry out ADLs; assess patient's baseline for ADL function and identify physical deficits which impact ability to perform ADLs (bathing, care of mouth/teeth, toileting, grooming, dressing, etc )  - Assess/evaluate cause of self-care deficits   - Assess range of motion  - Assess patient's mobility  - Assess patient's need for assistive devices and provide as appropriate  - Encourage maximum independence but intervene and supervise when necessary  - Involve family in performance of ADLs  - Assess for home care needs following discharge   - Consider OT consult to assist with ADL evaluation and planning for discharge  - Provide patient education as appropriate  Outcome: Progressing  Goal: Maintain proper alignment of affected body part  Description: INTERVENTIONS:  - Support, maintain and protect limb and body alignment  - Provide patient/ family with appropriate education  Outcome: Progressing     Problem: Potential for Falls  Goal: Patient will remain free of falls  Description: INTERVENTIONS:  - Educate patient/family on patient safety including physical limitations  - Instruct patient to call for assistance with activity   - Consult OT/PT to assist with strengthening/mobility   - Keep Call bell within reach  - Keep bed low and locked with side rails adjusted as appropriate  - Keep care items and personal belongings within reach  - Initiate and maintain comfort rounds  - Make Fall Risk Sign visible to staff  - Offer Toileting every 2 Hours, in advance of need  - Initiate/Maintain bed alarm  - Obtain necessary fall risk management equipment  - Apply yellow socks and bracelet for high fall risk patients  - Consider moving patient to room near nurses station  Outcome: Progressing     Problem: Nutrition/Hydration-ADULT  Goal: Nutrient/Hydration intake appropriate for improving, restoring or maintaining nutritional needs  Description: Monitor and assess patient's nutrition/hydration status for malnutrition  Collaborate with interdisciplinary team and initiate plan and interventions as ordered  Monitor patient's weight and dietary intake as ordered or per policy  Utilize nutrition screening tool and intervene as necessary  Determine patient's food preferences and provide high-protein, high-caloric foods as appropriate       INTERVENTIONS:  - Monitor oral intake, urinary output, labs, and treatment plans  - Assess nutrition and hydration status and recommend course of action  - Evaluate amount of meals eaten  - Assist patient with eating if necessary   - Allow adequate time for meals  - Recommend/ encourage appropriate diets, oral nutritional supplements, and vitamin/mineral supplements  - Order, calculate, and assess calorie counts as needed  - Recommend, monitor, and adjust tube feedings and TPN/PPN based on assessed needs  - Assess need for intravenous fluids  - Provide specific nutrition/hydration education as appropriate  - Include patient/family/caregiver in decisions related to nutrition  Outcome: Progressing
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
## 2022-03-01 NOTE — PLAN OF CARE
Problem: PHYSICAL THERAPY ADULT  Goal: Performs mobility at highest level of function for planned discharge setting  See evaluation for individualized goals  Description: Treatment/Interventions: Functional transfer training,LE strengthening/ROM,Therapeutic exercise,Endurance training,Patient/family training,Equipment eval/education,Bed mobility,Gait training,Spoke to nursing,OT  Equipment Recommended: [facility]       See flowsheet documentation for full assessment, interventions and recommendations  Outcome: Progressing  Note: Prognosis: Good  Problem List: Decreased strength,Decreased endurance,Impaired balance,Decreased mobility,Pain,Orthopedic restrictions,Obesity,Decreased skin integrity  Assessment: Pt demonstrated significantly improved mobiilty & functional endurance this session  Was able to perform bed mobiilty wiht assist primarily for LLE management due to presence of fixator that got caught in bedding  Otherwise, anticipate pt will be able to manage without assist s/p internalization  Pt did require time to recover EOB due to dizziness initially, which resided & did not return with other activity  Pt was able to ambulate repeated short distances with use of RW as noted above while maintaining WBS throughout  did require standing, then seated rest to recover, primarily due to fatigue as a result of increased effort to perform ambulatory tasks  Encouraged pt to use shoe on RLE to improve LLE foot clearance & curshion R heel during gait, which may reduce effort requried to perform tasks  Pt verbalized understanding & offers no further questions or conerns s/p exercise  Discussed POC and discharge plan with pt at conclusion of session  Based on home set up as previously noted, pt will likely be able to return home with follow up home PT services pending results of internalization surgery when medically appropriate    Plan to continue practicing mobitly tasks & maybe trial steps to assess if pt will be able to access all levels of home upon d/c   Barriers to Discharge: Inaccessible home environment,Decreased caregiver support        PT Discharge Recommendation: Post acute rehabilitation services          See flowsheet documentation for full assessment

## 2022-03-01 NOTE — PHYSICAL THERAPY NOTE
Physical Therapy Progress Note     03/01/22 1055   PT Last Visit   PT Visit Date 03/01/22   Note Type   Note Type Treatment   Pain Assessment   Pain Assessment Tool 0-10   Pain Score 3   Pain Location/Orientation Orientation: Left; Location: Foot   Hospital Pain Intervention(s) Repositioned; Ambulation/increased activity; Elevated; Rest   Restrictions/Precautions   LLE Weight Bearing Per Order NWB   Braces or Orthoses Other (Comment)  (ex fix)   Other Precautions WBS;Pain; Fall Risk   Subjective   Subjective pt encountered supine in bed, pleasant and agreeable to treatment  Reports no pain at rest, and minimal with activity  Does demonstrate dyspnea with activity, but does not report any other change in status  Confirmed that he will not have [facility] home & will have [address], but is agreeable to perform stair training in upcoming sessions to assess ability to reach upstairs full bathroom if he would be able to take a shower  Bed Mobility   Supine to Sit 4  Minimal assistance   Additional items Assist x 1;HOB elevated; Increased time required;LE management   Transfers   Sit to Stand 5  Supervision   Additional items Assist x 1; Armrests; Increased time required   Stand to Sit 5  Supervision   Additional items Assist x 1; Armrests; Increased time required   Ambulation/Elevation   Gait pattern   (3 point gait pattern)   Gait Assistance 4  Minimal assist   Additional items Assist x 1   Assistive Device Bariatric Rolling walker   Distance 30' x 2   Balance   Static Sitting Fair +   Static Standing Fair   Ambulatory Fair -   Activity Tolerance   Activity Tolerance Patient tolerated treatment well;Patient limited by fatigue;Patient limited by pain   Nurse [address], RN   Assessment   Prognosis Good   Problem List Decreased strength;Decreased endurance; Impaired balance;Decreased mobility;Pain;Orthopedic restrictions; Obesity; Decreased skin integrity   Assessment Pt demonstrated significantly improved mobiilty & functional endurance this session  Was able to perform bed mobiilty wiht assist primarily for LLE management due to presence of fixator that got caught in bedding  Otherwise, anticipate pt will be able to manage without assist s/p internalization  Pt did require time to recover EOB due to dizziness initially, which resided & did not return with other activity  Pt was able to ambulate repeated short distances with use of RW as noted above while maintaining WBS throughout  did require standing, then seated rest to recover, primarily due to fatigue as a result of increased effort to perform ambulatory tasks  Encouraged pt to use shoe on RLE to improve LLE foot clearance & curshion R heel during gait, which may reduce effort requried to perform tasks  Pt verbalized understanding & offers no further questions or conerns s/p exercise  Discussed POC and discharge plan with pt at conclusion of session  Based on home set up as previously noted, pt will likely be able to return home with follow up home PT services pending results of internalization surgery when medically appropriate  Plan to continue practicing mobitly tasks & maybe trial steps to assess if pt will be able to access all levels of home upon d/c  Goals   Patient Goals to get better   STG Expiration Date 03/13/22   PT Treatment Day 1   Plan   Treatment/Interventions Functional transfer training;LE strengthening/ROM; Therapeutic exercise;Elevations; Endurance training;Patient/family training;Equipment eval/education; Bed mobility;Gait training   Progress Progressing toward goals   PT Frequency 3-5x/wk   Recommendation   PT Discharge Recommendation Post acute rehabilitation services   Equipment Recommended Walker; Wheelchair   Wheelchair Package Recommended Heavy Duty (pt wt 250 lbs+)   Change or Add item to [employer]? Yes, Change Item   What would you like to CHANGE?  Different Leg Rest Type (Elevating Leg Rests)   Walker Package Recommended HD Bariatric wheeled walker   Sony 8 in Bed Without Bedrails 3   Lying on Back to Sitting on Edge of Flat Bed 3   Moving Bed to Chair 3   Standing Up From Chair 3   Walk in Room 3   Climb 3-5 Stairs 1   Basic Mobility Inpatient Raw Score 16   Basic Mobility Standardized Score 38 32   Highest Level Of Mobility   [facility] Goal 5: Stand one or more mins   The patient's AM-PAC Basic Mobility Inpatient Short Form Raw Score is 16  A Raw score of less than or equal to 16 suggests the patient may benefit from discharge to post-acute rehabilitation services  Please also refer to the recommendation of the Physical Therapist for safe discharge planning            Dustin Escobar, PTA
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
## 2022-03-02 ENCOUNTER — ANESTHESIA EVENT (INPATIENT)
Dept: PERIOP | Facility: HOSPITAL | Age: 27
DRG: 218 | End: 2022-03-02
Payer: OTHER MISCELLANEOUS

## 2022-03-02 LAB
ABO GROUP BLD: NORMAL
BLD GP AB SCN SERPL QL: NEGATIVE
GLUCOSE SERPL-MCNC: 118 MG/DL (ref 65–140)
GLUCOSE SERPL-MCNC: 160 MG/DL (ref 65–140)
GLUCOSE SERPL-MCNC: 208 MG/DL (ref 65–140)
RH BLD: POSITIVE
SPECIMEN EXPIRATION DATE: NORMAL

## 2022-03-02 PROCEDURE — 99232 SBSQ HOSP IP/OBS MODERATE 35: CPT | Performed by: SURGERY

## 2022-03-02 PROCEDURE — 86923 COMPATIBILITY TEST ELECTRIC: CPT

## 2022-03-02 PROCEDURE — 86900 BLOOD TYPING SEROLOGIC ABO: CPT

## 2022-03-02 PROCEDURE — 86850 RBC ANTIBODY SCREEN: CPT

## 2022-03-02 PROCEDURE — NC001 PR NO CHARGE: Performed by: ORTHOPAEDIC SURGERY

## 2022-03-02 PROCEDURE — 82948 REAGENT STRIP/BLOOD GLUCOSE: CPT

## 2022-03-02 PROCEDURE — 30233N1 TRANSFUSION OF NONAUTOLOGOUS RED BLOOD CELLS INTO PERIPHERAL VEIN, PERCUTANEOUS APPROACH: ICD-10-PCS | Performed by: SURGERY

## 2022-03-02 PROCEDURE — 86901 BLOOD TYPING SEROLOGIC RH(D): CPT

## 2022-03-02 RX ORDER — SODIUM CHLORIDE, SODIUM LACTATE, POTASSIUM CHLORIDE, CALCIUM CHLORIDE 600; 310; 30; 20 MG/100ML; MG/100ML; MG/100ML; MG/100ML
75 INJECTION, SOLUTION INTRAVENOUS CONTINUOUS
Status: DISCONTINUED | OUTPATIENT
Start: 2022-03-03 | End: 2022-03-03

## 2022-03-02 RX ADMIN — CARVEDILOL 25 MG: 25 TABLET, FILM COATED ORAL at 17:19

## 2022-03-02 RX ADMIN — ATORVASTATIN CALCIUM 20 MG: 20 TABLET, FILM COATED ORAL at 09:36

## 2022-03-02 RX ADMIN — ENOXAPARIN SODIUM 60 MG: 60 INJECTION SUBCUTANEOUS at 21:26

## 2022-03-02 RX ADMIN — CARVEDILOL 25 MG: 25 TABLET, FILM COATED ORAL at 09:36

## 2022-03-02 RX ADMIN — ENOXAPARIN SODIUM 60 MG: 60 INJECTION SUBCUTANEOUS at 09:37

## 2022-03-02 RX ADMIN — ACETAMINOPHEN 975 MG: 325 TABLET ORAL at 21:26

## 2022-03-02 RX ADMIN — SODIUM CHLORIDE, SODIUM LACTATE, POTASSIUM CHLORIDE, AND CALCIUM CHLORIDE 75 ML/HR: .6; .31; .03; .02 INJECTION, SOLUTION INTRAVENOUS at 23:44

## 2022-03-02 RX ADMIN — ACETAMINOPHEN 975 MG: 325 TABLET ORAL at 12:52

## 2022-03-02 RX ADMIN — SACUBITRIL AND VALSARTAN 1 TABLET: 97; 103 TABLET, FILM COATED ORAL at 09:37

## 2022-03-02 RX ADMIN — SACUBITRIL AND VALSARTAN 1 TABLET: 97; 103 TABLET, FILM COATED ORAL at 17:19

## 2022-03-02 RX ADMIN — ACETAMINOPHEN 975 MG: 325 TABLET ORAL at 05:15

## 2022-03-02 RX ADMIN — SPIRONOLACTONE 25 MG: 25 TABLET ORAL at 09:36
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
## 2022-03-02 NOTE — UTILIZATION REVIEW
Continued Stay Review    Date: 3/2                          Current Patient Class: Inpatient  Current Level of Care: Med surg    HPI:26 y o  male initially admitted on 2/23    Assessment/Plan:   Per Orthopedic; POD 5 left ankle spanning ex fix, doing well from orthopedic perspective  Plan OR for later tomorrow for ORIF L [place]  NPO at [place]  Continue aggressive ice and elevation  Pain control  3/3 Tentative OR - OPEN REDUCTION W/ INTERNAL FIXATION (ORIF) LEFT TIBIA PILON FRACTURE, REMOVAL OF EXTERNAL FIXATOR (Left Leg Lower)    Vital Signs:   03/02/22 1421 98 1 °F (36 7 °C) 100 18 130/82 -- 96 % -- -- --   03/02/22 1003 98 2 °F (36 8 °C) 94 18 134/80 -- 96 % -- -- --   03/02/22 0746 -- -- -- -- -- -- -- -- None (Room air)     Pertinent Labs/Diagnostic Results:   Results from last 7 days   Lab Units 02/25/22  0020   SARS-COV-2  Negative     Results from last 7 days   Lab Units 02/28/22  0738 02/27/22  0553 02/26/22  0522 02/25/22  1308 02/25/22  1308 02/25/22  1043 02/25/22  0505 02/25/22  0505 02/23/22  1501   WBC Thousand/uL 9 46 13 95* 16 50*   < > 11 85*  --    < > 7 81  --    HEMOGLOBIN g/dL 12 6 12 5 11 9*  --  12 7  --   --  12 5  --    I STAT HEMOGLOBIN g/dl  --   --   --   --   --  13 6  --   --    < >   HEMATOCRIT % 40 2 39 7 38 1  --  41 2  --   --  40 8  --    HEMATOCRIT, ISTAT %  --   --   --   --   --  40  --   --    < >   PLATELETS Thousands/uL 254 258 248   < > 261  --    < > 254  --    NEUTROS ABS Thousands/µL 6 33 11 05*  --   --   --   --   --  5 07  --     < > = values in this interval not displayed           Results from last 7 days   Lab Units 02/28/22  0738 02/27/22  0916 02/27/22  0553 02/26/22  0522 02/25/22  1308 02/25/22  1043 02/25/22  0505 02/25/22  0505 02/24/22  0526 02/24/22  0526 02/23/22  1501   SODIUM mmol/L 138 140 135* 137 138  --    < > 140   < > 139  --    POTASSIUM mmol/L 4 1 4 2 5 6* 4 5 4 6  --    < > 4 4   < > 4 3  --    CHLORIDE mmol/L 107 109* 109* 104 108  --    < > 108 < > 107  --    CO2 mmol/L 28 27 21 27 27  --    < > 28   < > 27  --    CO2, I-STAT mmol/L  --   --   --   --   --  29  --   --   --   --    < >   ANION GAP mmol/L 3* 4 5 6 3*  --    < > 4   < > 5  --    BUN mg/dL 13 19 23 23 14  --    < > 11   < > 10  --    CREATININE mg/dL 0 94 1 23 1 10 1 42* 1 32*  --    < > 1 06   < > 1 01  --    EGFR ml/min/1 73sq m 110 79 91 67 73  --    < > 95   < > 101  --    CALCIUM mg/dL 8 9 8 9 8 8 8 8 8 3  --    < > 8 6   < > 8 8  --    CALCIUM, IONIZED, ISTAT mmol/L  --   --   --   --   --  1 18  --   --   --   --   --    MAGNESIUM mg/dL  --   --   --  2 6 2 3  --   --  2 6  --  2 6  --    PHOSPHORUS mg/dL  --   --   --  4 2 2 6*  --   --  3 7  --   --   --     < > = values in this interval not displayed           Results from last 7 days   Lab Units 03/02/22  1130 03/02/22  0732 03/01/22  2034 03/01/22  1613 03/01/22  1128 03/01/22  0824 02/28/22  2053 02/28/22  1719 02/28/22  1125 02/28/22  0726 02/27/22  2149 02/27/22  1708   POC GLUCOSE mg/dl 160* 118 202* 180* 127 140 191* 141* 252* 176* 158* 149*     Results from last 7 days   Lab Units 02/28/22  0738 02/27/22  0916 02/27/22  0553 02/26/22  0522 02/25/22  1308 02/25/22  0505 02/24/22  0526 02/23/22  1502   GLUCOSE RANDOM mg/dL 144* 146* 98 160* 180* 107 116 163*             No results found for: BETA-HYDROXYBUTYRATE   Results from last 7 days   Lab Units 02/25/22  1313   PH ART  7 342*   PCO2 ART mm Hg 50 8*   PO2 ART mm Hg 78 2   HCO3 ART mmol/L 26 9   BASE EXC ART mmol/L 0 4   O2 CONTENT ART mL/dL 18 3   O2 HGB, ARTERIAL % 94 8   ABG SOURCE  Line, Arterial         Results from last 7 days   Lab Units 02/25/22  1043 02/23/22  1501   PH, JAVI I-STAT   --  7 374   PCO2, JAVI ISTAT mm HG  --  49 6   PO2, JAVI ISTAT mm HG  --  32 0*   HCO3, JAVI ISTAT mmol/L  --  29 0   I STAT BASE EXC mmol/L -3* 3   I STAT O2 SAT % 91* 58*   ISTAT PH ART  7 167*  --    I STAT ART PCO2 mm HG 74 6*  --    I STAT ART PO2 mm HG 80 0  --    I STAT ART HCO3 mmol/L 27 0  --          Results from last 7 days   Lab Units 02/23/22  2141 02/23/22  1654 02/23/22  1502   HS TNI 0HR ng/L  --   --  78*   HS TNI 2HR ng/L  --  86*  --    HSTNI D2 ng/L  --  8  --    HS TNI 4HR ng/L 94*  --   --    HSTNI D4 ng/L 16  --   --          Results from last 7 days   Lab Units 02/25/22  0505 02/24/22  0526 02/23/22  1601   PROTIME seconds 13 3 13 4 12 9   INR  1 05 1 06 1 01   PTT seconds 32 26 26             Results from last 7 days   Lab Units 02/25/22  1308   LACTIC ACID mmol/L 1 6         Results from last 7 days   Lab Units 02/25/22  0020   INFLUENZA A PCR  Negative   INFLUENZA B PCR  Negative   RSV PCR  Negative       Medications:   Scheduled Medications:  acetaminophen, 975 mg, Oral, Q8H [address]  atorvastatin, 20 mg, Oral, Daily  carvedilol, 25 mg, Oral, BID With Meals  docusate sodium, 100 mg, Oral, BID  enoxaparin, 60 mg, Subcutaneous, Q12H TREASURE  insulin lispro, 2-12 Units, Subcutaneous, TID With Meals  polyethylene glycol, 17 g, Oral, Daily  sacubitril-valsartan, 1 tablet, Oral, BID  spironolactone, 25 mg, Oral, Daily      Continuous IV Infusions:  [START ON 3/3/2022] lactated ringers, 75 mL/hr, Intravenous, Continuous      PRN Meds:  calcium carbonate, 500 mg, Oral, Daily PRN  hydrALAZINE, 5 mg, Intravenous, Q6H PRN  HYDROmorphone, 0 5 mg, Intravenous, Q4H PRN  oxyCODONE, 10 mg, Oral, Q4H PRN  oxyCODONE, 5 mg, Oral, Q4H PRN        Discharge Plan: D    Network Utilization Review Department  ATTENTION: Please call with any questions or concerns to [phone] and carefully listen to the prompts so that you are directed to the right person  All voicemails are confidential   Tejas De Oliveira all requests for admission clinical reviews, approved or denied determinations and any other requests to dedicated fax number below belonging to the campus where the patient is receiving treatment   List of dedicated fax numbers for the Facilities:  FACILITY NAME UR FAX NUMBER   ADMISSION DENIALS (Administrative/Medical Necessity) [phone]   [address] (Maternity/NICU/Pediatrics) [address] [facility] 40 125 [facility]  [phone]   Amanda Mcgraw 50 [address] [facility] [place] [address] [address] [place] [address] Leonila Connor [address] [facility] [address] [facility] [phone]

## 2022-03-02 NOTE — ASSESSMENT & PLAN NOTE
No results found for: HGBA1C    Recent Labs     03/01/22  1613 03/01/22  2034 03/02/22  0732 03/02/22  1130   POCGLU 180* 202* 118 160*       Blood Sugar Average: Last 72 hrs:  (P) 161     - continue sliding scale insulin  - close glucose control in the setting of orthopedic injury

## 2022-03-02 NOTE — PLAN OF CARE
Problem: MOBILITY - ADULT  Goal: Maintain or return to baseline ADL function  Description: INTERVENTIONS:  -  Assess patient's ability to carry out ADLs; assess patient's baseline for ADL function and identify physical deficits which impact ability to perform ADLs (bathing, care of mouth/teeth, toileting, grooming, dressing, etc )  - Assess/evaluate cause of self-care deficits   - Assess range of motion  - Assess patient's mobility; develop plan if impaired  - Assess patient's need for assistive devices and provide as appropriate  - Encourage maximum independence but intervene and supervise when necessary  - Involve family in performance of ADLs  - Assess for home care needs following discharge   - Consider OT consult to assist with ADL evaluation and planning for discharge  - Provide patient education as appropriate  Outcome: Progressing  Goal: Maintains/Returns to pre admission functional level  Description: INTERVENTIONS:  - Perform BMAT or MOVE assessment daily    - Set and communicate daily mobility goal to care team and patient/family/caregiver     - Collaborate with rehabilitation services on mobility goals if consulted  - Out of bed for toileting  - Record patient progress and toleration of activity level   Outcome: Progressing     Problem: PAIN - ADULT  Goal: Verbalizes/displays adequate comfort level or baseline comfort level  Description: Interventions:  - Encourage patient to monitor pain and request assistance  - Assess pain using appropriate pain scale  - Administer analgesics based on type and severity of pain and evaluate response  - Implement non-pharmacological measures as appropriate and evaluate response  - Consider cultural and social influences on pain and pain management  - Notify physician/advanced practitioner if interventions unsuccessful or patient reports new pain  Outcome: Progressing     Problem: INFECTION - ADULT  Goal: Absence or prevention of progression during hospitalization  Description: INTERVENTIONS:  - Assess and monitor for signs and symptoms of infection  - Monitor lab/diagnostic results  - Monitor all insertion sites, i e  indwelling lines, tubes, and drains  - [place] appropriate cooling/warming therapies per order  - Administer medications as ordered  - Instruct and encourage patient and family to use good hand hygiene technique  - Identify and instruct in appropriate isolation precautions for identified infection/condition  Outcome: Progressing  Goal: Absence of fever/infection during neutropenic period  Description: INTERVENTIONS:  - Monitor WBC    Outcome: Progressing     Problem: SAFETY ADULT  Goal: Maintain or return to baseline ADL function  Description: INTERVENTIONS:  -  Assess patient's ability to carry out ADLs; assess patient's baseline for ADL function and identify physical deficits which impact ability to perform ADLs (bathing, care of mouth/teeth, toileting, grooming, dressing, etc )  - Assess/evaluate cause of self-care deficits   - Assess range of motion  - Assess patient's mobility; develop plan if impaired  - Assess patient's need for assistive devices and provide as appropriate  - Encourage maximum independence but intervene and supervise when necessary  - Involve family in performance of ADLs  - Assess for home care needs following discharge   - Consider OT consult to assist with ADL evaluation and planning for discharge  - Provide patient education as appropriate  Outcome: Progressing  Goal: Maintains/Returns to pre admission functional level  Description: INTERVENTIONS:  - Perform BMAT or MOVE assessment daily    - Set and communicate daily mobility goal to care team and patient/family/caregiver     - Collaborate with rehabilitation services on mobility goals if consulted  - Out of bed for toileting  - Record patient progress and toleration of activity level   Outcome: Progressing  Goal: Patient will remain free of falls  Description: INTERVENTIONS:  - Educate patient/family on patient safety including physical limitations  - Instruct patient to call for assistance with activity   - Consult OT/PT to assist with strengthening/mobility   - Keep Call bell within reach  - Keep bed low and locked with side rails adjusted as appropriate  - Keep care items and personal belongings within reach  - Initiate and maintain comfort rounds  - Make Fall Risk Sign visible to staff  - Offer Toileting every 2 Hours, in advance of need  - Initiate/Maintain bed alarm  - Obtain necessary fall risk management equipment  - Apply yellow socks and bracelet for high fall risk patients  - Consider moving patient to room near nurses station  Outcome: Progressing     Problem: DISCHARGE PLANNING  Goal: Discharge to home or other facility with appropriate resources  Description: INTERVENTIONS:  - Identify barriers to discharge w/patient and caregiver  - Arrange for needed discharge resources and transportation as appropriate  - Identify discharge learning needs (meds, wound care, etc )  - Arrange for interpretive services to assist at discharge as needed  - Refer to Case Management Department for coordinating discharge planning if the patient needs post-hospital services based on physician/advanced practitioner order or complex needs related to functional status, cognitive ability, or social support system  Outcome: Progressing     Problem: Knowledge Deficit  Goal: Patient/family/caregiver demonstrates understanding of disease process, treatment plan, medications, and discharge instructions  Description: Complete learning assessment and assess knowledge base    Interventions:  - Provide teaching at level of understanding  - Provide teaching via preferred learning methods  Outcome: Progressing     Problem: Prexisting or High Potential for Compromised Skin Integrity  Goal: Skin integrity is maintained or improved  Description: INTERVENTIONS:  - Identify patients at risk for skin breakdown  - Assess and monitor skin integrity  - Assess and monitor nutrition and hydration status  - Monitor labs   - Assess for incontinence   - Turn and reposition patient  - Assist with mobility/ambulation  - Relieve pressure over bony prominences  - Avoid friction and shearing  - Provide appropriate hygiene as needed including keeping skin clean and dry  - Evaluate need for skin moisturizer/barrier cream  - Collaborate with interdisciplinary team   - Patient/family teaching  - Consider wound care consult   Outcome: Progressing     Problem: MUSCULOSKELETAL - ADULT  Goal: Maintain or return mobility to safest level of function  Description: INTERVENTIONS:  - Assess patient's ability to carry out ADLs; assess patient's baseline for ADL function and identify physical deficits which impact ability to perform ADLs (bathing, care of mouth/teeth, toileting, grooming, dressing, etc )  - Assess/evaluate cause of self-care deficits   - Assess range of motion  - Assess patient's mobility  - Assess patient's need for assistive devices and provide as appropriate  - Encourage maximum independence but intervene and supervise when necessary  - Involve family in performance of ADLs  - Assess for home care needs following discharge   - Consider OT consult to assist with ADL evaluation and planning for discharge  - Provide patient education as appropriate  Outcome: Progressing  Goal: Maintain proper alignment of affected body part  Description: INTERVENTIONS:  - Support, maintain and protect limb and body alignment  - Provide patient/ family with appropriate education  Outcome: Progressing     Problem: Potential for Falls  Goal: Patient will remain free of falls  Description: INTERVENTIONS:  - Educate patient/family on patient safety including physical limitations  - Instruct patient to call for assistance with activity   - Consult OT/PT to assist with strengthening/mobility   - Keep Call bell within reach  - Keep bed low and locked with side rails adjusted as appropriate  - Keep care items and personal belongings within reach  - Initiate and maintain comfort rounds  - Make Fall Risk Sign visible to staff  - Offer Toileting every 2 Hours, in advance of need  - Initiate/Maintain bed alarm  - Obtain necessary fall risk management equipment  - Apply yellow socks and bracelet for high fall risk patients  - Consider moving patient to room near nurses station  Outcome: Progressing     Problem: Nutrition/Hydration-ADULT  Goal: Nutrient/Hydration intake appropriate for improving, restoring or maintaining nutritional needs  Description: Monitor and assess patient's nutrition/hydration status for malnutrition  Collaborate with interdisciplinary team and initiate plan and interventions as ordered  Monitor patient's weight and dietary intake as ordered or per policy  Utilize nutrition screening tool and intervene as necessary  Determine patient's food preferences and provide high-protein, high-caloric foods as appropriate       INTERVENTIONS:  - Monitor oral intake, urinary output, labs, and treatment plans  - Assess nutrition and hydration status and recommend course of action  - Evaluate amount of meals eaten  - Assist patient with eating if necessary   - Allow adequate time for meals  - Recommend/ encourage appropriate diets, oral nutritional supplements, and vitamin/mineral supplements  - Order, calculate, and assess calorie counts as needed  - Recommend, monitor, and adjust tube feedings and TPN/PPN based on assessed needs  - Assess need for intravenous fluids  - Provide specific nutrition/hydration education as appropriate  - Include patient/family/caregiver in decisions related to nutrition  Outcome: Progressing
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
## 2022-03-02 NOTE — PROGRESS NOTES
Progress Note - Orthopedics   Grabiel Detroit 31 y o  male MRN: [id number]  Unit/Bed#: [facility]PHP 634-01      Subjective:    31 y o male POD 5 ex fi x L pilon fracture  Pt doing well  Pain controlled to left lower extremity      Labs:  0   Lab Value Date/Time    HCT 40 2 02/28/2022 0738    HCT 39 7 02/27/2022 0553    HCT 38 1 02/26/2022 0522    HGB 12 6 02/28/2022 0738    HGB 12 5 02/27/2022 0553    HGB 11 9 (L) 02/26/2022 0522    INR 1 05 02/25/2022 0505    WBC 9 46 02/28/2022 0738    WBC 13 95 (H) 02/27/2022 0553    WBC 16 50 (H) 02/26/2022 0522       Meds:    Current Facility-Administered Medications:     acetaminophen (TYLENOL) tablet 975 mg, 975 mg, Oral, Q8H [facility], Herrera Virk MD, 975 mg at 03/02/22 0515    atorvastatin (LIPITOR) tablet 20 mg, 20 mg, Oral, Daily, Herrera Virk MD, 20 mg at 03/01/22 0945    calcium carbonate (TUMS) chewable tablet 500 mg, 500 mg, Oral, Daily PRN, Herrera Virk MD, 500 mg at 02/23/22 2328    carvedilol (COREG) tablet 25 mg, 25 mg, Oral, BID With Meals, Herrera Virk MD, 25 mg at 03/01/22 1707    docusate sodium (COLACE) capsule 100 mg, 100 mg, Oral, BID, Herrera Virk MD, 100 mg at 02/28/22 0940    enoxaparin (LOVENOX) subcutaneous injection 60 mg, 60 mg, Subcutaneous, Q12H [facility], Herrera Virk MD, 60 mg at 03/01/22 2129    hydrALAZINE (APRESOLINE) injection 5 mg, 5 mg, Intravenous, Q6H PRN, Yuridia Brown PA-C    HYDROmorphone (DILAUDID) injection 0 5 mg, 0 5 mg, Intravenous, Q4H PRN, Herrera Virk MD    insulin lispro (HumaLOG) 100 units/mL subcutaneous injection 2-12 Units, 2-12 Units, Subcutaneous, TID With Meals, 2 Units at 03/01/22 1707 **AND** Fingerstick Glucose (POCT), , , 4x Daily AC and at bedtime, Yuridia Brown PA-C    oxyCODONE (ROXICODONE) immediate release tablet 10 mg, 10 mg, Oral, Q4H PRN, Herrera Virk MD, 10 mg at 02/27/22 0308    oxyCODONE (ROXICODONE) IR tablet 5 mg, 5 mg, Oral, Q4H PRN, Herrera Virk MD, 5 mg at 02/26/22 1810    polyethylene glycol (MIRALAX) packet 17 g, 17 g, Oral, Daily, Lenora Ruiz MD, 17 g at 02/26/22 0829    sacubitril-valsartan (ENTRESTO)  MG per tablet 1 tablet, 1 tablet, Oral, BID, Kylee Bria, DO, 1 tablet at 03/01/22 1706    spironolactone (ALDACTONE) tablet 25 mg, 25 mg, Oral, Daily, Tino Rico PA-C, 25 mg at 03/01/22 0945    Blood Culture:   No results found for: BLOODCX    Wound Culture:   No results found for: WOUNDCULT    Ins and Outs:  I/O last 24 hours: In: 12 [P O :936]  Out: 2250 [Urine:2250]          Physical:  Vitals:    03/02/22 0300   BP: 131/79   Pulse: 86   Resp: 15   Temp: 97 8 °F (36 6 °C)   SpO2: (!) 88%     Musculoskeletal: Left Lower Extremity  · Skin warm, dry, tissues of the lower leg soft and compressible  · Minimal foot swelling with improvement from yesterday  · Dressings and pin sites clean, dry, intact  · Tender to palpation over the distal ankle  · Sensation intact  · Firing of EHL/EHL  · Toes warm and well perfused    Assessment:    26 y o male POD 5 left ankle spanning ex fix, doing well from orthopedic perspective  Plan:  · NWB LLE  · Continue aggressive ice and elevation  · Will plan for OR later tomorrow for ORIF L Pilon  · NPO at midnight  · Pre-op  · No diagnosis of acute blood loss anemia, will monitor and administer IVF/prbc as indicated   · PT/OT  · Pain control  · DVT ppx - recommend lovenox or other now and for 28 days after internalization of fixation construct    · Dispo: Ortho will follow    Karla Boudreaux MD

## 2022-03-03 ENCOUNTER — ANESTHESIA (INPATIENT)
Dept: PERIOP | Facility: HOSPITAL | Age: 27
DRG: 218 | End: 2022-03-03
Payer: OTHER MISCELLANEOUS

## 2022-03-03 ENCOUNTER — APPOINTMENT (INPATIENT)
Dept: RADIOLOGY | Facility: HOSPITAL | Age: 27
DRG: 218 | End: 2022-03-03
Payer: OTHER MISCELLANEOUS

## 2022-03-03 LAB
ANION GAP SERPL CALCULATED.3IONS-SCNC: 3 MMOL/L (ref 4–13)
APTT PPP: 30 SECONDS (ref 23–37)
BASE EXCESS BLDA CALC-SCNC: -1 MMOL/L (ref -2–3)
BASE EXCESS BLDA CALC-SCNC: -1 MMOL/L (ref -2–3)
BASOPHILS # BLD AUTO: 0.05 THOUSANDS/ΜL (ref 0–0.1)
BASOPHILS NFR BLD AUTO: 1 % (ref 0–1)
BUN SERPL-MCNC: 14 MG/DL (ref 5–25)
CA-I BLD-SCNC: 1.14 MMOL/L (ref 1.12–1.32)
CA-I BLD-SCNC: 1.42 MMOL/L (ref 1.12–1.32)
CALCIUM SERPL-MCNC: 9.1 MG/DL (ref 8.3–10.1)
CHLORIDE SERPL-SCNC: 106 MMOL/L (ref 100–108)
CO2 SERPL-SCNC: 29 MMOL/L (ref 21–32)
CREAT SERPL-MCNC: 0.98 MG/DL (ref 0.6–1.3)
EOSINOPHIL # BLD AUTO: 0.32 THOUSAND/ΜL (ref 0–0.61)
EOSINOPHIL NFR BLD AUTO: 4 % (ref 0–6)
ERYTHROCYTE [DISTWIDTH] IN BLOOD BY AUTOMATED COUNT: 13.8 % (ref 11.6–15.1)
GFR SERPL CREATININE-BSD FRML MDRD: 105 ML/MIN/1.73SQ M
GLUCOSE SERPL-MCNC: 107 MG/DL (ref 65–140)
GLUCOSE SERPL-MCNC: 121 MG/DL (ref 65–140)
GLUCOSE SERPL-MCNC: 126 MG/DL (ref 65–140)
GLUCOSE SERPL-MCNC: 182 MG/DL (ref 65–140)
GLUCOSE SERPL-MCNC: 204 MG/DL (ref 65–140)
HCO3 BLDA-SCNC: 25.2 MMOL/L (ref 22–28)
HCO3 BLDA-SCNC: 25.5 MMOL/L (ref 22–28)
HCT VFR BLD AUTO: 39.6 % (ref 36.5–49.3)
HCT VFR BLD CALC: 39 % (ref 36.5–49.3)
HCT VFR BLD CALC: 41 % (ref 36.5–49.3)
HGB BLD-MCNC: 12.7 G/DL (ref 12–17)
HGB BLDA-MCNC: 13.3 G/DL (ref 12–17)
HGB BLDA-MCNC: 13.9 G/DL (ref 12–17)
IMM GRANULOCYTES # BLD AUTO: 0.03 THOUSAND/UL (ref 0–0.2)
IMM GRANULOCYTES NFR BLD AUTO: 0 % (ref 0–2)
INR PPP: 0.97 (ref 0.84–1.19)
LYMPHOCYTES # BLD AUTO: 1.84 THOUSANDS/ΜL (ref 0.6–4.47)
LYMPHOCYTES NFR BLD AUTO: 22 % (ref 14–44)
MCH RBC QN AUTO: 27.3 PG (ref 26.8–34.3)
MCHC RBC AUTO-ENTMCNC: 32.1 G/DL (ref 31.4–37.4)
MCV RBC AUTO: 85 FL (ref 82–98)
MONOCYTES # BLD AUTO: 0.89 THOUSAND/ΜL (ref 0.17–1.22)
MONOCYTES NFR BLD AUTO: 11 % (ref 4–12)
NEUTROPHILS # BLD AUTO: 5.12 THOUSANDS/ΜL (ref 1.85–7.62)
NEUTS SEG NFR BLD AUTO: 62 % (ref 43–75)
NRBC BLD AUTO-RTO: 0 /100 WBCS
PCO2 BLD: 27 MMOL/L (ref 21–32)
PCO2 BLD: 27 MMOL/L (ref 21–32)
PCO2 BLD: 45.5 MM HG (ref 36–44)
PCO2 BLD: 50.1 MM HG (ref 36–44)
PH BLD: 7.31 [PH] (ref 7.35–7.45)
PH BLD: 7.35 [PH] (ref 7.35–7.45)
PLATELET # BLD AUTO: 299 THOUSANDS/UL (ref 149–390)
PMV BLD AUTO: 10.5 FL (ref 8.9–12.7)
PO2 BLD: 105 MM HG (ref 75–129)
PO2 BLD: 132 MM HG (ref 75–129)
POTASSIUM BLD-SCNC: 5 MMOL/L (ref 3.5–5.3)
POTASSIUM BLD-SCNC: 6 MMOL/L (ref 3.5–5.3)
POTASSIUM SERPL-SCNC: 4.5 MMOL/L (ref 3.5–5.3)
PROTHROMBIN TIME: 12.5 SECONDS (ref 11.6–14.5)
RBC # BLD AUTO: 4.66 MILLION/UL (ref 3.88–5.62)
SAO2 % BLD FROM PO2: 97 % (ref 60–85)
SAO2 % BLD FROM PO2: 99 % (ref 60–85)
SODIUM BLD-SCNC: 138 MMOL/L (ref 136–145)
SODIUM BLD-SCNC: 139 MMOL/L (ref 136–145)
SODIUM SERPL-SCNC: 138 MMOL/L (ref 136–145)
SPECIMEN SOURCE: ABNORMAL
SPECIMEN SOURCE: ABNORMAL
WBC # BLD AUTO: 8.25 THOUSAND/UL (ref 4.31–10.16)

## 2022-03-03 PROCEDURE — C1713 ANCHOR/SCREW BN/BN,TIS/BN: HCPCS | Performed by: ORTHOPAEDIC SURGERY

## 2022-03-03 PROCEDURE — 20694 RMVL EXT FIXJ SYS UNDER ANES: CPT | Performed by: PHYSICIAN ASSISTANT

## 2022-03-03 PROCEDURE — 20694 RMVL EXT FIXJ SYS UNDER ANES: CPT | Performed by: ORTHOPAEDIC SURGERY

## 2022-03-03 PROCEDURE — 0QPH05Z REMOVAL OF EXTERNAL FIXATION DEVICE FROM LEFT TIBIA, OPEN APPROACH: ICD-10-PCS | Performed by: ORTHOPAEDIC SURGERY

## 2022-03-03 PROCEDURE — 82948 REAGENT STRIP/BLOOD GLUCOSE: CPT

## 2022-03-03 PROCEDURE — 85014 HEMATOCRIT: CPT

## 2022-03-03 PROCEDURE — NC001 PR NO CHARGE: Performed by: ORTHOPAEDIC SURGERY

## 2022-03-03 PROCEDURE — 85730 THROMBOPLASTIN TIME PARTIAL: CPT | Performed by: SURGERY

## 2022-03-03 PROCEDURE — 27827 TREAT LOWER LEG FRACTURE: CPT | Performed by: ORTHOPAEDIC SURGERY

## 2022-03-03 PROCEDURE — 84295 ASSAY OF SERUM SODIUM: CPT

## 2022-03-03 PROCEDURE — C9290 INJ, BUPIVACAINE LIPOSOME: HCPCS | Performed by: STUDENT IN AN ORGANIZED HEALTH CARE EDUCATION/TRAINING PROGRAM

## 2022-03-03 PROCEDURE — 99232 SBSQ HOSP IP/OBS MODERATE 35: CPT | Performed by: SURGERY

## 2022-03-03 PROCEDURE — 27827 TREAT LOWER LEG FRACTURE: CPT | Performed by: PHYSICIAN ASSISTANT

## 2022-03-03 PROCEDURE — 82947 ASSAY GLUCOSE BLOOD QUANT: CPT

## 2022-03-03 PROCEDURE — 85025 COMPLETE CBC W/AUTO DIFF WBC: CPT | Performed by: SURGERY

## 2022-03-03 PROCEDURE — 84132 ASSAY OF SERUM POTASSIUM: CPT

## 2022-03-03 PROCEDURE — 82803 BLOOD GASES ANY COMBINATION: CPT

## 2022-03-03 PROCEDURE — 82330 ASSAY OF CALCIUM: CPT

## 2022-03-03 PROCEDURE — 85610 PROTHROMBIN TIME: CPT | Performed by: SURGERY

## 2022-03-03 PROCEDURE — 80048 BASIC METABOLIC PNL TOTAL CA: CPT | Performed by: SURGERY

## 2022-03-03 PROCEDURE — 73600 X-RAY EXAM OF ANKLE: CPT

## 2022-03-03 PROCEDURE — 0QSH04Z REPOSITION LEFT TIBIA WITH INTERNAL FIXATION DEVICE, OPEN APPROACH: ICD-10-PCS | Performed by: ORTHOPAEDIC SURGERY

## 2022-03-03 DEVICE — 3.5MM LOCKING SCREW SLF-TPNG W/STARDRIVE(TM) RECESS 40MM: Type: IMPLANTABLE DEVICE | Site: TIBIA | Status: FUNCTIONAL

## 2022-03-03 DEVICE — 3.5MM LCP® ANTEROLATERAL DISTAL TIBIA PL 11 HOLES/LEFT
Type: IMPLANTABLE DEVICE | Site: TIBIA | Status: FUNCTIONAL
Brand: LCP

## 2022-03-03 DEVICE — 3.5MM CORTEX SCREW SELF-TAPPING 34MM: Type: IMPLANTABLE DEVICE | Site: TIBIA | Status: FUNCTIONAL

## 2022-03-03 DEVICE — 2.7MM CORTEX SCREW SELF-TAPPING 32MM: Type: IMPLANTABLE DEVICE | Site: TIBIA | Status: FUNCTIONAL

## 2022-03-03 DEVICE — 2.7MM CORTEX SCREW SELF-TAPPING 45MM: Type: IMPLANTABLE DEVICE | Site: TIBIA | Status: FUNCTIONAL

## 2022-03-03 DEVICE — 3.5MM LCP® METAPHYSEAL PLATE 7 HOLES
Type: IMPLANTABLE DEVICE | Site: TIBIA | Status: FUNCTIONAL
Brand: LCP

## 2022-03-03 DEVICE — 3.5MM CORTEX SCREW SELF-TAPPING 36MM: Type: IMPLANTABLE DEVICE | Site: TIBIA | Status: FUNCTIONAL

## 2022-03-03 DEVICE — 4.0MM CANCELLOUS BONE SCREW FULLY THREADED/50MM: Type: IMPLANTABLE DEVICE | Site: TIBIA | Status: FUNCTIONAL

## 2022-03-03 DEVICE — 3.5MM CORTEX SCREW SELF-TAPPING 46MM: Type: IMPLANTABLE DEVICE | Site: TIBIA | Status: FUNCTIONAL

## 2022-03-03 DEVICE — 2.7MM CORTEX SCREW SELF-TAPPING 26MM: Type: IMPLANTABLE DEVICE | Site: TIBIA | Status: FUNCTIONAL

## 2022-03-03 DEVICE — 3.5MM CORTEX SCREW SELF-TAPPING 32MM: Type: IMPLANTABLE DEVICE | Site: TIBIA | Status: FUNCTIONAL

## 2022-03-03 DEVICE — 2.7MM CORTEX SCREW SELF-TAPPING 50MM: Type: IMPLANTABLE DEVICE | Site: TIBIA | Status: FUNCTIONAL

## 2022-03-03 DEVICE — 3.5MM CORTEX SCREW SELF-TAPPING 44MM: Type: IMPLANTABLE DEVICE | Site: TIBIA | Status: FUNCTIONAL

## 2022-03-03 RX ORDER — ALBUMIN, HUMAN INJ 5% 5 %
SOLUTION INTRAVENOUS CONTINUOUS PRN
Status: DISCONTINUED | OUTPATIENT
Start: 2022-03-03 | End: 2022-03-03

## 2022-03-03 RX ORDER — CEFAZOLIN SODIUM 1 G/3ML
INJECTION, POWDER, FOR SOLUTION INTRAMUSCULAR; INTRAVENOUS AS NEEDED
Status: DISCONTINUED | OUTPATIENT
Start: 2022-03-03 | End: 2022-03-03

## 2022-03-03 RX ORDER — LIDOCAINE HYDROCHLORIDE 10 MG/ML
INJECTION, SOLUTION EPIDURAL; INFILTRATION; INTRACAUDAL; PERINEURAL AS NEEDED
Status: DISCONTINUED | OUTPATIENT
Start: 2022-03-03 | End: 2022-03-03

## 2022-03-03 RX ORDER — GLYCOPYRROLATE 0.2 MG/ML
INJECTION INTRAMUSCULAR; INTRAVENOUS AS NEEDED
Status: DISCONTINUED | OUTPATIENT
Start: 2022-03-03 | End: 2022-03-03

## 2022-03-03 RX ORDER — BUPIVACAINE HYDROCHLORIDE 2.5 MG/ML
INJECTION, SOLUTION EPIDURAL; INFILTRATION; INTRACAUDAL
Status: COMPLETED | OUTPATIENT
Start: 2022-03-03 | End: 2022-03-03

## 2022-03-03 RX ORDER — SODIUM CHLORIDE, SODIUM LACTATE, POTASSIUM CHLORIDE, CALCIUM CHLORIDE 600; 310; 30; 20 MG/100ML; MG/100ML; MG/100ML; MG/100ML
INJECTION, SOLUTION INTRAVENOUS CONTINUOUS PRN
Status: DISCONTINUED | OUTPATIENT
Start: 2022-03-03 | End: 2022-03-03

## 2022-03-03 RX ORDER — DEXTROSE MONOHYDRATE 25 G/50ML
INJECTION, SOLUTION INTRAVENOUS AS NEEDED
Status: DISCONTINUED | OUTPATIENT
Start: 2022-03-03 | End: 2022-03-03

## 2022-03-03 RX ORDER — CEFAZOLIN SODIUM 2 G/50ML
2000 SOLUTION INTRAVENOUS EVERY 8 HOURS
Status: COMPLETED | OUTPATIENT
Start: 2022-03-03 | End: 2022-03-04

## 2022-03-03 RX ORDER — CALCIUM CHLORIDE 100 MG/ML
INJECTION INTRAVENOUS; INTRAVENTRICULAR AS NEEDED
Status: DISCONTINUED | OUTPATIENT
Start: 2022-03-03 | End: 2022-03-03

## 2022-03-03 RX ORDER — CEFAZOLIN SODIUM 2 G/50ML
2000 SOLUTION INTRAVENOUS
Status: DISCONTINUED | OUTPATIENT
Start: 2022-03-04 | End: 2022-03-03 | Stop reason: HOSPADM

## 2022-03-03 RX ORDER — FUROSEMIDE 10 MG/ML
INJECTION INTRAMUSCULAR; INTRAVENOUS AS NEEDED
Status: DISCONTINUED | OUTPATIENT
Start: 2022-03-03 | End: 2022-03-03

## 2022-03-03 RX ORDER — PROPOFOL 10 MG/ML
INJECTION, EMULSION INTRAVENOUS AS NEEDED
Status: DISCONTINUED | OUTPATIENT
Start: 2022-03-03 | End: 2022-03-03

## 2022-03-03 RX ORDER — FENTANYL CITRATE 50 UG/ML
INJECTION, SOLUTION INTRAMUSCULAR; INTRAVENOUS AS NEEDED
Status: DISCONTINUED | OUTPATIENT
Start: 2022-03-03 | End: 2022-03-03

## 2022-03-03 RX ORDER — NEOSTIGMINE METHYLSULFATE 1 MG/ML
INJECTION INTRAVENOUS AS NEEDED
Status: DISCONTINUED | OUTPATIENT
Start: 2022-03-03 | End: 2022-03-03

## 2022-03-03 RX ORDER — MIDAZOLAM HYDROCHLORIDE 2 MG/2ML
INJECTION, SOLUTION INTRAMUSCULAR; INTRAVENOUS AS NEEDED
Status: DISCONTINUED | OUTPATIENT
Start: 2022-03-03 | End: 2022-03-03

## 2022-03-03 RX ORDER — MAGNESIUM HYDROXIDE 1200 MG/15ML
LIQUID ORAL AS NEEDED
Status: DISCONTINUED | OUTPATIENT
Start: 2022-03-03 | End: 2022-03-03 | Stop reason: HOSPADM

## 2022-03-03 RX ORDER — ROCURONIUM BROMIDE 10 MG/ML
INJECTION, SOLUTION INTRAVENOUS AS NEEDED
Status: DISCONTINUED | OUTPATIENT
Start: 2022-03-03 | End: 2022-03-03

## 2022-03-03 RX ORDER — VASOPRESSIN 20 U/ML
INJECTION PARENTERAL AS NEEDED
Status: DISCONTINUED | OUTPATIENT
Start: 2022-03-03 | End: 2022-03-03

## 2022-03-03 RX ORDER — MIDAZOLAM HYDROCHLORIDE 2 MG/2ML
INJECTION, SOLUTION INTRAMUSCULAR; INTRAVENOUS
Status: COMPLETED | OUTPATIENT
Start: 2022-03-03 | End: 2022-03-03

## 2022-03-03 RX ORDER — ETOMIDATE 2 MG/ML
INJECTION INTRAVENOUS AS NEEDED
Status: DISCONTINUED | OUTPATIENT
Start: 2022-03-03 | End: 2022-03-03

## 2022-03-03 RX ORDER — SUCCINYLCHOLINE/SOD CL,ISO/PF 100 MG/5ML
SYRINGE (ML) INTRAVENOUS AS NEEDED
Status: DISCONTINUED | OUTPATIENT
Start: 2022-03-03 | End: 2022-03-03

## 2022-03-03 RX ADMIN — Medication 200 MG: at 11:11

## 2022-03-03 RX ADMIN — SODIUM CHLORIDE, SODIUM LACTATE, POTASSIUM CHLORIDE, AND CALCIUM CHLORIDE: .6; .31; .03; .02 INJECTION, SOLUTION INTRAVENOUS at 11:01

## 2022-03-03 RX ADMIN — CALCIUM CHLORIDE 0.5 G: 100 INJECTION INTRAVENOUS; INTRAVENTRICULAR at 15:36

## 2022-03-03 RX ADMIN — ETOMIDATE 20 MG: 20 INJECTION, SOLUTION INTRAVENOUS at 11:11

## 2022-03-03 RX ADMIN — INSULIN HUMAN 10 UNITS: 100 INJECTION, SOLUTION PARENTERAL at 15:27

## 2022-03-03 RX ADMIN — FENTANYL CITRATE 100 MCG: 50 INJECTION INTRAMUSCULAR; INTRAVENOUS at 11:11

## 2022-03-03 RX ADMIN — CARVEDILOL 25 MG: 25 TABLET, FILM COATED ORAL at 08:19

## 2022-03-03 RX ADMIN — ROCURONIUM BROMIDE 30 MG: 50 INJECTION, SOLUTION INTRAVENOUS at 12:56

## 2022-03-03 RX ADMIN — CARVEDILOL 25 MG: 25 TABLET, FILM COATED ORAL at 20:50

## 2022-03-03 RX ADMIN — MIDAZOLAM HYDROCHLORIDE 2 MG: 2 INJECTION, SOLUTION INTRAMUSCULAR; INTRAVENOUS at 14:43

## 2022-03-03 RX ADMIN — ACETAMINOPHEN 975 MG: 325 TABLET ORAL at 22:43

## 2022-03-03 RX ADMIN — PHENYLEPHRINE HYDROCHLORIDE 50 MCG/MIN: 10 INJECTION INTRAVENOUS at 11:57

## 2022-03-03 RX ADMIN — ROCURONIUM BROMIDE 50 MG: 50 INJECTION, SOLUTION INTRAVENOUS at 11:20

## 2022-03-03 RX ADMIN — BUPIVACAINE HYDROCHLORIDE 30 ML: 2.5 INJECTION, SOLUTION EPIDURAL; INFILTRATION; INTRACAUDAL at 10:56

## 2022-03-03 RX ADMIN — MIDAZOLAM 2 MG: 1 INJECTION INTRAMUSCULAR; INTRAVENOUS at 10:56

## 2022-03-03 RX ADMIN — CEFAZOLIN 3000 MG: 1 INJECTION, POWDER, FOR SOLUTION INTRAMUSCULAR; INTRAVENOUS at 15:27

## 2022-03-03 RX ADMIN — LIDOCAINE HYDROCHLORIDE 100 MG: 10 INJECTION, SOLUTION EPIDURAL; INFILTRATION; INTRACAUDAL; PERINEURAL at 11:11

## 2022-03-03 RX ADMIN — PROPOFOL 50 MG: 10 INJECTION, EMULSION INTRAVENOUS at 11:14

## 2022-03-03 RX ADMIN — ROCURONIUM BROMIDE 20 MG: 50 INJECTION, SOLUTION INTRAVENOUS at 13:48

## 2022-03-03 RX ADMIN — ROCURONIUM BROMIDE 30 MG: 50 INJECTION, SOLUTION INTRAVENOUS at 11:48

## 2022-03-03 RX ADMIN — OXYCODONE HYDROCHLORIDE 5 MG: 5 TABLET ORAL at 18:59

## 2022-03-03 RX ADMIN — FUROSEMIDE 20 MG: 10 INJECTION, SOLUTION INTRAVENOUS at 15:19

## 2022-03-03 RX ADMIN — SACUBITRIL AND VALSARTAN 1 TABLET: 97; 103 TABLET, FILM COATED ORAL at 20:50

## 2022-03-03 RX ADMIN — MIDAZOLAM HYDROCHLORIDE 2 MG: 2 INJECTION, SOLUTION INTRAMUSCULAR; INTRAVENOUS at 15:52

## 2022-03-03 RX ADMIN — ALBUMIN (HUMAN): 12.5 INJECTION, SOLUTION INTRAVENOUS at 14:56

## 2022-03-03 RX ADMIN — SODIUM CHLORIDE, SODIUM LACTATE, POTASSIUM CHLORIDE, AND CALCIUM CHLORIDE: .6; .31; .03; .02 INJECTION, SOLUTION INTRAVENOUS at 13:47

## 2022-03-03 RX ADMIN — SACUBITRIL AND VALSARTAN 1 TABLET: 97; 103 TABLET, FILM COATED ORAL at 08:19

## 2022-03-03 RX ADMIN — CALCIUM CHLORIDE 0.5 G: 100 INJECTION INTRAVENOUS; INTRAVENTRICULAR at 15:48

## 2022-03-03 RX ADMIN — ALBUMIN (HUMAN): 12.5 INJECTION, SOLUTION INTRAVENOUS at 15:15

## 2022-03-03 RX ADMIN — NOREPINEPHRINE BITARTRATE 2 MCG/MIN: 1 INJECTION, SOLUTION, CONCENTRATE INTRAVENOUS at 11:19

## 2022-03-03 RX ADMIN — CEFAZOLIN 3000 MG: 1 INJECTION, POWDER, FOR SOLUTION INTRAMUSCULAR; INTRAVENOUS at 11:27

## 2022-03-03 RX ADMIN — Medication 5 MCG/MIN: at 11:52

## 2022-03-03 RX ADMIN — CEFAZOLIN SODIUM 2000 MG: 2 SOLUTION INTRAVENOUS at 22:43

## 2022-03-03 RX ADMIN — PROPOFOL 50 MG: 10 INJECTION, EMULSION INTRAVENOUS at 11:11

## 2022-03-03 RX ADMIN — VASOPRESSIN 2 UNITS: 20 INJECTION INTRAVENOUS at 14:34

## 2022-03-03 RX ADMIN — GLYCOPYRROLATE 0.6 MG: 0.2 INJECTION, SOLUTION INTRAMUSCULAR; INTRAVENOUS at 16:49

## 2022-03-03 RX ADMIN — SPIRONOLACTONE 25 MG: 25 TABLET ORAL at 08:19

## 2022-03-03 RX ADMIN — ATORVASTATIN CALCIUM 20 MG: 20 TABLET, FILM COATED ORAL at 08:19

## 2022-03-03 RX ADMIN — NEOSTIGMINE METHYLSULFATE 4 MG: 1 INJECTION INTRAVENOUS at 16:49

## 2022-03-03 RX ADMIN — VASOPRESSIN 2 UNITS: 20 INJECTION INTRAVENOUS at 14:37

## 2022-03-03 RX ADMIN — DEXTROSE MONOHYDRATE 12.5 G: 25 INJECTION, SOLUTION INTRAVENOUS at 15:27

## 2022-03-03 RX ADMIN — FUROSEMIDE 20 MG: 10 INJECTION, SOLUTION INTRAVENOUS at 14:37

## 2022-03-03 RX ADMIN — ROCURONIUM BROMIDE 20 MG: 50 INJECTION, SOLUTION INTRAVENOUS at 12:04

## 2022-03-03 NOTE — ASSESSMENT & PLAN NOTE
- left tibial fracture, present on admission   - s/p ex-fix the left lower extremity on 2/25  - Appreciate Orthopedic surgery evaluation, recommendations and interventions as noted  - OR for internalization/definitive fixation today with Orthopedics  - Maintain non weightbearing status on the left lower extremity in external fixator  - Monitor left lower extremity neurovascular exam   - Continue multimodal analgesic regimen   - Continue DVT prophylaxis; 60 mg b i d   - PT and OT evaluation and treatment as indicated

## 2022-03-03 NOTE — ANESTHESIA PROCEDURE NOTES
Arterial Line Insertion  Performed by: Chrissy Romeo CRNA  Authorized by: Brennen John MD   Consent: Verbal consent obtained  Written consent obtained  Consent given by: patient  Time out: Immediately prior to procedure a "time out" was called to verify the correct patient, procedure, equipment, support staff and site/side marked as required    Indications: hemodynamic monitoring  Orientation:  Right  Location: radial artery  Procedure Details:  Needle gauge: 20  Seldinger technique: Seldinger technique used  Number of attempts: 1    Post-procedure:  Post-procedure: dressing applied  Waveform: good waveform  Post-procedure CNS: normal  Patient tolerance: patient tolerated the procedure well with no immediate complications

## 2022-03-03 NOTE — PHYSICAL THERAPY NOTE
Physical Therapy Cancellation Note       03/03/22 0957   Note Type   Note Type Cancelled Session   Cancel Reasons Patient to operating room  (ORIF L ankle)     Breanna Coronel, PASTORA

## 2022-03-03 NOTE — PROGRESS NOTES
Progress Note - Orthopedics   Josh Salas 31 y o  male MRN: [id number]  [facility]      Subjective:    26 y o male POD 6 ex fi x L pilon fracture  Pt doing well  Pain controlled to left lower extremity      Labs:  0   Lab Value Date/Time    HCT 40 2 02/28/2022 0738    HCT 39 7 02/27/2022 0553    HCT 38 1 02/26/2022 0522    HGB 12 6 02/28/2022 0738    HGB 12 5 02/27/2022 0553    HGB 11 9 (L) 02/26/2022 0522    INR 1 05 02/25/2022 0505    WBC 9 46 02/28/2022 0738    WBC 13 95 (H) 02/27/2022 0553    WBC 16 50 (H) 02/26/2022 0522       Meds:    Current Facility-Administered Medications:     acetaminophen (TYLENOL) tablet 975 mg, 975 mg, Oral, Q8H [facility], Rossi Whitfield MD, 975 mg at 03/02/22 2126    atorvastatin (LIPITOR) tablet 20 mg, 20 mg, Oral, Daily, Rossi Whitfield MD, 20 mg at 03/02/22 9980    calcium carbonate (TUMS) chewable tablet 500 mg, 500 mg, Oral, Daily PRN, Rossi Whitfield MD, 500 mg at 02/23/22 2328    carvedilol (COREG) tablet 25 mg, 25 mg, Oral, BID With Meals, Rossi Whitfield MD, 25 mg at 03/02/22 1719    docusate sodium (COLACE) capsule 100 mg, 100 mg, Oral, BID, Rossi Whitfield MD, 100 mg at 02/28/22 0940    enoxaparin (LOVENOX) subcutaneous injection 60 mg, 60 mg, Subcutaneous, Q12H [facility], Rossi Whitfield MD, 60 mg at 03/02/22 2126    hydrALAZINE (APRESOLINE) injection 5 mg, 5 mg, Intravenous, Q6H PRN, Neva Watters PA-C    HYDROmorphone (DILAUDID) injection 0 5 mg, 0 5 mg, Intravenous, Q4H PRN, Rossi Whitfield MD    insulin lispro (HumaLOG) 100 units/mL subcutaneous injection 2-12 Units, 2-12 Units, Subcutaneous, TID With Meals, 4 Units at 03/02/22 1719 **AND** Fingerstick Glucose (POCT), , , 4x Daily AC and at bedtime, Neva Watters PA-C    lactated ringers infusion, 75 mL/hr, Intravenous, Continuous, Arabella Bianchi MD, Last Rate: 75 mL/hr at 03/02/22 2344, 75 mL/hr at 03/02/22 2344    oxyCODONE (ROXICODONE) immediate release tablet 10 mg, 10 mg, Oral, Q4H PRN, Rossi Whitfield, MD, 10 mg at 02/27/22 0308    oxyCODONE (ROXICODONE) IR tablet 5 mg, 5 mg, Oral, Q4H PRN, Mariza Godoy MD, 5 mg at 02/26/22 1810    polyethylene glycol (MIRALAX) packet 17 g, 17 g, Oral, Daily, Mariza Godoy MD, 17 g at 02/26/22 0829    sacubitril-valsartan (ENTRESTO)  MG per tablet 1 tablet, 1 tablet, Oral, BID, Dawn Mayes DO, 1 tablet at 03/02/22 1719    spironolactone (ALDACTONE) tablet 25 mg, 25 mg, Oral, Daily, Gordon Lam PA-C, 25 mg at 03/02/22 6769    Blood Culture:   No results found for: BLOODCX    Wound Culture:   No results found for: WOUNDCULT    Ins and Outs:  I/O last 24 hours: In: 761 [P O :761]  Out: -           Physical:  Vitals:    03/03/22 0548   BP: 130/66   Pulse: 86   Resp: 18   Temp: 98 °F (36 7 °C)   SpO2: (!) 89%     Musculoskeletal: Left Lower Extremity  · Skin warm, dry, tissues of the lower leg soft and compressible  · Minimal foot swelling with improvement from yesterday   · Dressings and pin sites clean, dry, intact  · Tender to palpation over the distal ankle  · Sensation intact  · Firing of EHL/EHL  · Toes warm and well perfused    Assessment:    26 y o male POD 6 left ankle spanning ex fix, doing well from orthopedic perspective  Plan:  · NWB LLE  · Continue aggressive ice and elevation  · To OR for ORIF L [place] Today  · No diagnosis of acute blood loss anemia, will monitor and administer  · PT/OT-post op  · NPO  · Pain control  · DVT ppx - on hold for OR    · Dispo: Ortho will follow    Cranford Hatchet, MD
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
## 2022-03-03 NOTE — ASSESSMENT & PLAN NOTE
No results found for: HGBA1C    Recent Labs     03/02/22  0732 03/02/22  1130 03/02/22  1652 03/03/22  0731   POCGLU 118 160* 208* 107       Blood Sugar Average: Last 72 hrs:  (P) 760 [id number]     - continue sliding scale insulin  - close glucose control in the setting of orthopedic injury

## 2022-03-03 NOTE — UTILIZATION REVIEW
Continued Stay Review    Date: 03/03/2022                         Current Patient Class: Inpatient  Current Level of Care: Med/Surg    HPI:26 y o  male initially admitted on 02/23/2022  57/00/9593  Procedure: APPLICATION EXTERNAL FIXATION DEVICE LOWER EXTREMITY    Assessment/Plan:    POD 6 left ankle spanning ex fix  NWB LLE  Continue aggressive ice & elevation  To OR for ORIF L Pilon today  No diagnosis of acute blood loss anemia will monitor H/H & administer is necessary  PT/OT post -OP  NOP  Pain control PRN  DVT-ppx will hold for OR  Surgery Date:  03/03/2022  Procedure: OPEN REDUCTION W/ INTERNAL FIXATION (ORIF) LEFT TIBIA PILON FRACTURE, REMOVAL OF EXTERNAL FIXATOR  Anesthesia:  General    Vital Signs: /74   Pulse (!) 114   Temp 98 5 °F (36 9 °C)   Resp 18   Ht 5' 10" (1 778 m)   Wt (!) 160 kg (352 lb 11 8 oz)   SpO2 99%   BMI 50 61 kg/m²     Pertinent Labs/Diagnostic Results:   Results from last 7 days   Lab Units 02/25/22  0020   SARS-COV-2  Negative     Results from last 7 days   Lab Units 03/03/22  0739 02/28/22  0738 02/27/22  0553 02/26/22  0522 02/26/22  0522 02/25/22  1308 02/25/22  1308   WBC Thousand/uL 8 25 9 46 13 95*   < > 16 50*   < > 11 85*   HEMOGLOBIN g/dL 12 7 12 6 12 5  --  11 9*  --  12 7   HEMATOCRIT % 39 6 40 2 39 7  --  38 1  --  41 2   PLATELETS Thousands/uL 299 254 258   < > 248   < > 261   NEUTROS ABS Thousands/µL 5 12 6 33 11 05*  --   --   --   --     < > = values in this interval not displayed       Results from last 7 days   Lab Units 03/03/22  0805 02/28/22  0738 02/27/22  0916 02/27/22  0553 02/26/22  0522 02/25/22  1308 02/25/22  1308 02/25/22  1043 02/25/22  0505 02/25/22  0505   SODIUM mmol/L 138 138 140 135* 137   < > 138  --    < > 140   POTASSIUM mmol/L 4 5 4 1 4 2 5 6* 4 5   < > 4 6  --    < > 4 4   CHLORIDE mmol/L 106 107 109* 109* 104   < > 108  --    < > 108   CO2 mmol/L 29 28 27 21 27   < > 27  --    < > 28   CO2, I-STAT mmol/L  --   --   -- --   --   --   --  29  --   --    ANION GAP mmol/L 3* 3* 4 5 6   < > 3*  --    < > 4   BUN mg/dL 14 13 19 23 23   < > 14  --    < > 11   CREATININE mg/dL 0 98 0 94 1 23 1 10 1 42*   < > 1 32*  --    < > 1 06   EGFR ml/min/1 73sq m 105 110 79 91 67   < > 73  --    < > 95   CALCIUM mg/dL 9 1 8 9 8 9 8 8 8 8   < > 8 3  --    < > 8 6   CALCIUM, IONIZED, ISTAT mmol/L  --   --   --   --   --   --   --  1 18  --   --    MAGNESIUM mg/dL  --   --   --   --  2 6  --  2 3  --   --  2 6   PHOSPHORUS mg/dL  --   --   --   --  4 2  --  2 6*  --   --  3 7    < > = values in this interval not displayed       Results from last 7 days   Lab Units 03/03/22  0731 03/02/22  1652 03/02/22  1130 03/02/22  0732 03/01/22  2034 03/01/22  1613 03/01/22  1128 03/01/22  0824 02/28/22  2053 02/28/22  1719 02/28/22  1125 02/28/22  0726   POC GLUCOSE mg/dl 107 208* 160* 118 202* 180* 127 140 191* 141* 252* 176*     Results from last 7 days   Lab Units 03/03/22  0805 02/28/22  0738 02/27/22  0916 02/27/22  0553 02/26/22  0522 02/25/22  1308 02/25/22  0505   GLUCOSE RANDOM mg/dL 121 144* 146* 98 160* 180* 107      Results from last 7 days   Lab Units 02/25/22  1313   PH ART  7 342*   PCO2 ART mm Hg 50 8*   PO2 ART mm Hg 78 2   HCO3 ART mmol/L 26 9   BASE EXC ART mmol/L 0 4   O2 CONTENT ART mL/dL 18 3   O2 HGB, ARTERIAL % 94 8   ABG SOURCE  Line, Arterial     Results from last 7 days   Lab Units 02/25/22  1043   I STAT BASE EXC mmol/L -3*   I STAT O2 SAT % 91*   ISTAT PH ART  7 167*   I STAT ART PCO2 mm HG 74 6*   I STAT ART PO2 mm HG 80 0   I STAT ART HCO3 mmol/L 27 0     Results from last 7 days   Lab Units 03/03/22  0833 02/25/22  0505   PROTIME seconds 12 5 13 3   INR  0 97 1 05   PTT seconds 30 32     Results from last 7 days   Lab Units 02/25/22  1308   LACTIC ACID mmol/L 1 6       Results from last 7 days   Lab Units 02/25/22  0020   INFLUENZA A PCR  Negative   INFLUENZA B PCR  Negative   RSV PCR  Negative     Medications:   Scheduled Medications:  [MAR Hold] acetaminophen, 975 mg, Oral, Q8H [address]  [MAR Hold] atorvastatin, 20 mg, Oral, Daily  [MAR Hold] bupivacaine liposomal, 20 mL, Infiltration, Once  [MAR Hold] carvedilol, 25 mg, Oral, BID With Meals  [MAR Hold] docusate sodium, 100 mg, Oral, BID  [MAR Hold] enoxaparin, 60 mg, Subcutaneous, Q12H [address]  [MAR Hold] insulin lispro, 2-12 Units, Subcutaneous, TID With Meals  [MAR Hold] polyethylene glycol, 17 g, Oral, Daily  [MAR Hold] sacubitril-valsartan, 1 tablet, Oral, BID  [MAR Hold] spironolactone, 25 mg, Oral, Daily      Continuous IV Infusions:  lactated ringers, 75 mL/hr, Intravenous, Continuous      PRN Meds:  [MAR Hold] calcium carbonate, 500 mg, Oral, Daily PRN  [MAR Hold] hydrALAZINE, 5 mg, Intravenous, Q6H PRN  [MAR Hold] HYDROmorphone, 0 5 mg, Intravenous, Q4H PRN  [MAR Hold] oxyCODONE, 10 mg, Oral, Q4H PRN  [MAR Hold] oxyCODONE, 5 mg, Oral, Q4H PRN        Discharge Plan: [facility]    Network Utilization Review Department  ATTENTION: Please call with any questions or concerns to [phone] and carefully listen to the prompts so that you are directed to the right person  All voicemails are confidential   Bethany Hallman all requests for admission clinical reviews, approved or denied determinations and any other requests to dedicated fax number below belonging to the campus where the patient is receiving treatment   List of dedicated fax numbers for the Facilities:  [address] DENIALS (Administrative/Medical Necessity) [phone]   [address] (Maternity/NICU/Pediatrics) [phone]   [address] [place] [address] [place] [address] [address] [place] [address] Moraima Shelton  José Antonio  41  [phone]   187 [facility] 14[address] [facility] [place] [address] [phone]

## 2022-03-03 NOTE — OCCUPATIONAL THERAPY NOTE
Occupational Therapy Treatment Note:       03/03/22 1300   Note Type   Cancel Reasons Patient to operating room  (will follow as appropraite )   Kiera Guthrie

## 2022-03-03 NOTE — ANESTHESIA PROCEDURE NOTES
Peripheral Block    Patient location during procedure: holding area  Start time: 3/3/2022 10:56 AM  Reason for block: at surgeon's request and post-op pain management  Staffing  Performed: Anesthesiologist   Anesthesiologist: Esau Dasilva MD  Preanesthetic Checklist  Completed: patient identified, IV checked, site marked, risks and benefits discussed, surgical consent, monitors and equipment checked, pre-op evaluation and timeout performed  Peripheral Block  Patient position: right lateral decubitus  Prep: ChloraPrep  Patient monitoring: continuous pulse ox, frequent blood pressure checks and heart rate  Block type: popliteal and sciatic  Laterality: left  Injection technique: single-shot  Procedures: ultrasound guided, Ultrasound guidance required for the procedure to increase accuracy and safety of medication placement and decrease risk of complications  bupivacaine (MARCAINE) 0 25 % perineural infiltration, 30 mL  midazolam (VERSED) 2 mg/2 mL IV, 2 mg (mixed with 10 cc of 1 3% liposomal bupivacaine)  Needle  Needle length: 10 cm  Needle localization: ultrasound guidance, nerve stimulator and paresthesias  Test dose: negative  Assessment  Injection assessment: incremental injection, local visualized surrounding nerve on ultrasound, negative aspiration for heme and transient paresthesias  Paresthesia pain: immediately resolved  Heart rate change: no  Slow fractionated injection: yes  Post-procedure:  site cleaned  patient tolerated the procedure well with no immediate complications

## 2022-03-03 NOTE — ANESTHESIA PROCEDURE NOTES
Peripheral Block    Patient location during procedure: holding area  Reason for block: at surgeon's request and post-op pain management  Staffing  Performed: Anesthesiologist   Anesthesiologist: Yumiko Abdullahi MD  Preanesthetic Checklist  Completed: patient identified, IV checked, site marked, risks and benefits discussed, surgical consent, monitors and equipment checked, pre-op evaluation and timeout performed  Peripheral Block  Patient position: supine  Prep: ChloraPrep  Patient monitoring: continuous pulse ox and frequent blood pressure checks  Block type: femoral  Laterality: left  Injection technique: single-shot  Procedures: ultrasound guided, Ultrasound guidance required for the procedure to increase accuracy and safety of medication placement and decrease risk of complications  bupivacaine (MARCAINE) 0 25 % perineural infiltration, 30 mL (mixed with 10 cc of 1 3% liposomal bupivacaine)  Needle  Needle localization: nerve stimulator, paresthesias and ultrasound guidance  Test dose: negative  Assessment  Injection assessment: incremental injection, local visualized surrounding nerve on ultrasound and transient paresthesias  Paresthesia pain: immediately resolved  Heart rate change: no  Slow fractionated injection: yes  Post-procedure:  site cleaned  patient tolerated the procedure well with no immediate complications

## 2022-03-03 NOTE — ANESTHESIA POSTPROCEDURE EVALUATION
Post-Op Assessment Note    CV Status:  Stable  Pain Score: 0    Pain management: adequate     Mental Status:  Alert and awake   Hydration Status:  Euvolemic   PONV Controlled:  Controlled   Airway Patency:  Patent      Post Op Vitals Reviewed: Yes      Staff: CRNA         No complications documented      /96 (03/03/22 1717)    Temp 98 5 °F (36 9 °C) (03/03/22 1717)    Pulse (!) 114 (03/03/22 1717)   Resp 18 (03/03/22 1717)    SpO2 99 % (03/03/22 1717)

## 2022-03-04 LAB
ANION GAP SERPL CALCULATED.3IONS-SCNC: 6 MMOL/L (ref 4–13)
BASOPHILS # BLD AUTO: 0.03 THOUSANDS/ΜL (ref 0–0.1)
BASOPHILS NFR BLD AUTO: 0 % (ref 0–1)
BUN SERPL-MCNC: 16 MG/DL (ref 5–25)
CALCIUM SERPL-MCNC: 9.2 MG/DL (ref 8.3–10.1)
CHLORIDE SERPL-SCNC: 106 MMOL/L (ref 100–108)
CO2 SERPL-SCNC: 25 MMOL/L (ref 21–32)
CREAT SERPL-MCNC: 1.13 MG/DL (ref 0.6–1.3)
EOSINOPHIL # BLD AUTO: 0.26 THOUSAND/ΜL (ref 0–0.61)
EOSINOPHIL NFR BLD AUTO: 2 % (ref 0–6)
ERYTHROCYTE [DISTWIDTH] IN BLOOD BY AUTOMATED COUNT: 13.7 % (ref 11.6–15.1)
GFR SERPL CREATININE-BSD FRML MDRD: 88 ML/MIN/1.73SQ M
GLUCOSE SERPL-MCNC: 131 MG/DL (ref 65–140)
GLUCOSE SERPL-MCNC: 135 MG/DL (ref 65–140)
GLUCOSE SERPL-MCNC: 143 MG/DL (ref 65–140)
GLUCOSE SERPL-MCNC: 169 MG/DL (ref 65–140)
GLUCOSE SERPL-MCNC: 294 MG/DL (ref 65–140)
HCT VFR BLD AUTO: 37.8 % (ref 36.5–49.3)
HGB BLD-MCNC: 12 G/DL (ref 12–17)
IMM GRANULOCYTES # BLD AUTO: 0.06 THOUSAND/UL (ref 0–0.2)
IMM GRANULOCYTES NFR BLD AUTO: 1 % (ref 0–2)
LYMPHOCYTES # BLD AUTO: 1.45 THOUSANDS/ΜL (ref 0.6–4.47)
LYMPHOCYTES NFR BLD AUTO: 13 % (ref 14–44)
MCH RBC QN AUTO: 27 PG (ref 26.8–34.3)
MCHC RBC AUTO-ENTMCNC: 31.7 G/DL (ref 31.4–37.4)
MCV RBC AUTO: 85 FL (ref 82–98)
MONOCYTES # BLD AUTO: 0.93 THOUSAND/ΜL (ref 0.17–1.22)
MONOCYTES NFR BLD AUTO: 8 % (ref 4–12)
NEUTROPHILS # BLD AUTO: 8.34 THOUSANDS/ΜL (ref 1.85–7.62)
NEUTS SEG NFR BLD AUTO: 76 % (ref 43–75)
NRBC BLD AUTO-RTO: 0 /100 WBCS
PLATELET # BLD AUTO: 282 THOUSANDS/UL (ref 149–390)
PMV BLD AUTO: 10.4 FL (ref 8.9–12.7)
POTASSIUM SERPL-SCNC: 4.4 MMOL/L (ref 3.5–5.3)
RBC # BLD AUTO: 4.45 MILLION/UL (ref 3.88–5.62)
SODIUM SERPL-SCNC: 137 MMOL/L (ref 136–145)
WBC # BLD AUTO: 11.07 THOUSAND/UL (ref 4.31–10.16)

## 2022-03-04 PROCEDURE — 80048 BASIC METABOLIC PNL TOTAL CA: CPT | Performed by: PHYSICIAN ASSISTANT

## 2022-03-04 PROCEDURE — 99232 SBSQ HOSP IP/OBS MODERATE 35: CPT | Performed by: NURSE PRACTITIONER

## 2022-03-04 PROCEDURE — 97116 GAIT TRAINING THERAPY: CPT

## 2022-03-04 PROCEDURE — 99232 SBSQ HOSP IP/OBS MODERATE 35: CPT | Performed by: SURGERY

## 2022-03-04 PROCEDURE — 85025 COMPLETE CBC W/AUTO DIFF WBC: CPT | Performed by: PHYSICIAN ASSISTANT

## 2022-03-04 PROCEDURE — NC001 PR NO CHARGE: Performed by: ORTHOPAEDIC SURGERY

## 2022-03-04 PROCEDURE — NC001 PR NO CHARGE: Performed by: NURSE PRACTITIONER

## 2022-03-04 PROCEDURE — 82948 REAGENT STRIP/BLOOD GLUCOSE: CPT

## 2022-03-04 PROCEDURE — 97530 THERAPEUTIC ACTIVITIES: CPT

## 2022-03-04 RX ADMIN — CEFAZOLIN SODIUM 2000 MG: 2 SOLUTION INTRAVENOUS at 06:44

## 2022-03-04 RX ADMIN — ENOXAPARIN SODIUM 60 MG: 60 INJECTION SUBCUTANEOUS at 05:28

## 2022-03-04 RX ADMIN — ACETAMINOPHEN 975 MG: 325 TABLET ORAL at 05:28

## 2022-03-04 RX ADMIN — ATORVASTATIN CALCIUM 20 MG: 20 TABLET, FILM COATED ORAL at 08:12

## 2022-03-04 RX ADMIN — SACUBITRIL AND VALSARTAN 1 TABLET: 97; 103 TABLET, FILM COATED ORAL at 17:10

## 2022-03-04 RX ADMIN — DOCUSATE SODIUM 100 MG: 100 CAPSULE ORAL at 17:10

## 2022-03-04 RX ADMIN — ENOXAPARIN SODIUM 60 MG: 60 INJECTION SUBCUTANEOUS at 17:10

## 2022-03-04 RX ADMIN — ACETAMINOPHEN 975 MG: 325 TABLET ORAL at 21:54

## 2022-03-04 RX ADMIN — OXYCODONE HYDROCHLORIDE 10 MG: 10 TABLET ORAL at 08:19

## 2022-03-04 RX ADMIN — ACETAMINOPHEN 975 MG: 325 TABLET ORAL at 13:52

## 2022-03-04 RX ADMIN — SACUBITRIL AND VALSARTAN 1 TABLET: 97; 103 TABLET, FILM COATED ORAL at 09:39

## 2022-03-04 RX ADMIN — HYDROMORPHONE HYDROCHLORIDE 0.5 MG: 1 INJECTION, SOLUTION INTRAMUSCULAR; INTRAVENOUS; SUBCUTANEOUS at 05:28

## 2022-03-04 RX ADMIN — OXYCODONE HYDROCHLORIDE 10 MG: 10 TABLET ORAL at 20:08

## 2022-03-04 RX ADMIN — DOCUSATE SODIUM 100 MG: 100 CAPSULE ORAL at 08:12

## 2022-03-04 RX ADMIN — CARVEDILOL 25 MG: 25 TABLET, FILM COATED ORAL at 17:10

## 2022-03-04 RX ADMIN — OXYCODONE HYDROCHLORIDE 10 MG: 10 TABLET ORAL at 13:52

## 2022-03-04 RX ADMIN — OXYCODONE HYDROCHLORIDE 10 MG: 10 TABLET ORAL at 04:19

## 2022-03-04 RX ADMIN — SPIRONOLACTONE 25 MG: 25 TABLET ORAL at 08:12

## 2022-03-04 RX ADMIN — CARVEDILOL 25 MG: 25 TABLET, FILM COATED ORAL at 08:12

## 2022-03-04 NOTE — RESTORATIVE TECHNICIAN NOTE
Restorative Technician Note      Patient Name: Gregorio Henriquez     Restorative Tech Visit Date: 03/04/22  Note Type: Mobility  Patient Position Upon Consult: Supine  Activity Performed: Ambulated; Transferred  Assistive Device: Roller walker  Patient Position at End of Consult: Bedside chair;  All needs within reach

## 2022-03-04 NOTE — PROGRESS NOTES
Progress Note - Orthopedics   Donne Los 31 y o  male MRN: [id number]  Unit/Bed#: [facility] 634-01      Subjective:    26 y o male POD 1 removal of LLE ex fix, ORIF L yosef  Doing well, pain controlled         Labs:  0   Lab Value Date/Time    HCT 39 03/03/2022 1559    HCT 41 03/03/2022 1519    HCT 39 6 03/03/2022 0739    HCT 40 2 02/28/2022 0738    HCT 39 7 02/27/2022 0553    HGB 13 3 03/03/2022 1559    HGB 13 9 03/03/2022 1519    HGB 12 7 03/03/2022 0739    HGB 12 6 02/28/2022 0738    HGB 12 5 02/27/2022 0553    INR 0 97 03/03/2022 0833    WBC 8 25 03/03/2022 0739    WBC 9 46 02/28/2022 0738    WBC 13 95 (H) 02/27/2022 0553       Meds:    Current Facility-Administered Medications:     acetaminophen (TYLENOL) tablet 975 mg, 975 mg, Oral, Q8H [facility], Fracisco Elizabeth PA-C, 975 mg at 03/04/22 1117    atorvastatin (LIPITOR) tablet 20 mg, 20 mg, Oral, Daily, Anju Mcclain PA-C, 20 mg at 03/03/22 7940    calcium carbonate (TUMS) chewable tablet 500 mg, 500 mg, Oral, Daily PRN, Anju Mcclain PA-C, 500 mg at 02/23/22 2328    carvedilol (COREG) tablet 25 mg, 25 mg, Oral, BID With Meals, Anju Mcclain PA-C, 25 mg at 03/03/22 2050    docusate sodium (COLACE) capsule 100 mg, 100 mg, Oral, BID, Fracisco Elizabeth PA-C, 100 mg at 02/28/22 0940    enoxaparin (LOVENOX) subcutaneous injection 60 mg, 60 mg, Subcutaneous, Q12H [facility], Fracisco Elizabeth PA-C, 60 mg at 03/04/22 2844    hydrALAZINE (APRESOLINE) injection 5 mg, 5 mg, Intravenous, Q6H PRN, Anju Mcclain PA-C    HYDROmorphone (DILAUDID) injection 0 5 mg, 0 5 mg, Intravenous, Q4H PRN, Anju Mcclain PA-C, 0 5 mg at 03/04/22 0528    insulin lispro (HumaLOG) 100 units/mL subcutaneous injection 2-12 Units, 2-12 Units, Subcutaneous, TID With Meals, 4 Units at 03/02/22 1719 **AND** Fingerstick Glucose (POCT), , , 4x Daily AC and at bedtime, Anju Mcclain PA-C    oxyCODONE (ROXICODONE) immediate release tablet 10 mg, 10 mg, Oral, Q4H PRN, YENIFER Sadler-C, 10 mg at 03/04/22 0419    oxyCODONE (ROXICODONE) IR tablet 5 mg, 5 mg, Oral, Q4H PRN, YENIFER Sadler-C, 5 mg at 03/03/22 1859    polyethylene glycol (MIRALAX) packet 17 g, 17 g, Oral, Daily, Fracisco Elizabeth PA-C, 17 g at 02/26/22 0829    sacubitril-valsartan (ENTRESTO)  MG per tablet 1 tablet, 1 tablet, Oral, BID, YENIFER Sadler-ELTON, 1 tablet at 03/03/22 2050    spironolactone (ALDACTONE) tablet 25 mg, 25 mg, Oral, Daily, Fracisco Elizabeth PA-C, 25 mg at 03/03/22 1678    Blood Culture:   No results found for: BLOODCX    Wound Culture:   No results found for: WOUNDCULT    Ins and Outs:  I/O last 24 hours: In: 3000 [P O :760; I V :1690; IV Piggyback:550]  Out: 1327 [Urine:2525]          Physical:  Vitals:    03/04/22 0718   BP: 144/80   Pulse: (!) 120   Resp: 21   Temp: 99 °F (37 2 °C)   SpO2: 92%     Musculoskeletal: left Lower Extremity  · Splint in place  · TTP ANKLE  · Sensation grossly intact to foot/toes  · Motor intact EHL/FHL  · 2 sec cap refill    Assessment:    26 y o male POD 1 removal of LLE ex fix, ORIF L pilon  Doing well        Plan:  · NWB LLE  · Will monitor for ABLA  · PT/OT  · Pain control  · DVT PPX: lovenox x 28 days post op  · Dispo: Ortho will follow    Dmitry Bingham MD

## 2022-03-04 NOTE — QUICK NOTE
Post op check  Trauma  Stephany Moots 31 y o  male MRN: [id number]  [facility] Encounter: [id number]    Assessment:  31 y o  male now Day of Surgery s/p Procedure(s) (LRB):  OPEN REDUCTION W/ INTERNAL FIXATION (ORIF) LEFT TIBIA PILON FRACTURE, REMOVAL OF EXTERNAL FIXATOR (Left)     Stable VS     Plan:  - Diet Cardiovascular; Cardiac; Lo Cholesterol, Consistent Carbohydrate Diet Level 3 (6 carb servings/90 grams CHO/meal), Fluid Restriction 1800 ML, Sodium 2 GM  - Pain and Nausea control PRN  - Incentive spirometry  - PT/OT  Subjective/Objective     Subjective:  - pain well controlled  - no new complaints at this time  Objective:   Vitals: Blood pressure 145/89, pulse 93, temperature 98 8 °F (37 1 °C), resp  rate 18, height 5' 10" (1 778 m), weight (!) 160 kg (352 lb 11 8 oz), SpO2 90 %  ,Body mass index is 50 61 kg/m²  I/O       03/02 0701 03/03 0700 03/03 0701 03/04 0700    P  O  525 760    I V  (mL/kg)  1650 (10 3)    IV Piggyback  500    Total Intake(mL/kg) 525 (3 3) 2910 (18 2)    Urine (mL/kg/hr) 450 (0 1) 2025 (0 9)    Stool  0    Total Output 450 2025    Net +75 +885          Unmeasured Urine Occurrence 3 x 1 x    Unmeasured Stool Occurrence 0 x 0 x          Physical Exam:  Gen: NAD, Aox3, Comfortable in bed  Chest: Normal work of breathing, no respiratory distress  Abd: Soft, ND, NT  Ext: LLE in above knee cast: preserved sensation and motor function distally at the toes  Skin: Warm, Dry, Intact      Lab, Imaging and other studies:   I have personally reviewed pertinent reports    , CBC with diff:   Lab Results   Component Value Date    WBC 8 25 03/03/2022    HGB 13 3 03/03/2022    HCT 39 03/03/2022    MCV 85 03/03/2022     03/03/2022    MCH 27 3 03/03/2022    MCHC 32 1 03/03/2022    RDW 13 8 03/03/2022    MPV 10 5 03/03/2022    NRBC 0 03/03/2022   , BMP/CMP:   Lab Results   Component Value Date    SODIUM 138 03/03/2022    K 4 5 03/03/2022     03/03/2022    CO2 27 03/03/2022    BUN 14 03/03/2022    CREATININE 0 98 03/03/2022    GLUCOSE 204 (H) 03/03/2022    CALCIUM 9 1 03/03/2022    EGFR 105 03/03/2022     VTE Pharmacologic Prophylaxis: Enoxaparin (Lovenox)  VTE Mechanical Prophylaxis: sequential compression device

## 2022-03-04 NOTE — PHYSICAL THERAPY NOTE
Physical Therapy Progress Note     03/04/22 1425   PT Last Visit   PT Visit Date 03/04/22   Note Type   Note Type Treatment   Pain Assessment   Pain Assessment Tool 0-10   Pain Score 7   Pain Location/Orientation Orientation: Left; Location: Leg   Hospital Pain Intervention(s) Medication (See MAR); Repositioned; Ambulation/increased activity; Elevated; Rest   Restrictions/Precautions   LLE Weight Bearing Per Order NWB   Braces or Orthoses Splint   Other Precautions WBS;Pain; Fall Risk   Subjective   Subjective Pt encountered seated in recliner, pleasant and agreeable to treatment  Reports increased distal LLE pain, so RN was notified & administered pain meds just prior to activity  Pt reports aching pain at rest, then throbbing pain when LLE placed into dependent position  also demonstrated mod dyspnea with activity, but reports no other complaints  appeared more relaxed after returning to supine position  Bed Mobility   Sit to Supine 4  Minimal assistance   Additional items Assist x 1;LE management   Transfers   Sit to Stand 4  Minimal assistance   Additional items Assist x 1; Armrests; Increased time required   Stand to Sit 4  Minimal assistance   Additional items Assist x 1; Armrests; Increased time required   Ambulation/Elevation   Gait pattern   (3 point gait pattern)   Gait Assistance 4  Minimal assist   Additional items Assist x 1   Assistive Device Bariatric Rolling walker   Distance 20' x 2   Balance   Static Sitting Fair +   Static Standing Fair   Ambulatory Fair -   Activity Tolerance   Activity Tolerance Patient limited by pain; Patient tolerated treatment well   Nurse Annalee Ho RN   Assessment   Prognosis Good   Problem List Decreased strength;Decreased endurance; Impaired balance;Decreased mobility;Pain;Orthopedic restrictions; Obesity; Decreased skin integrity   Assessment Pt primarily limited by pain today, requiring increased time to perform all tasks & required extended breaks between tasks such as changing LLE elevation, ambulation & bed mobilty  Pt performed all tasks in similar manner compared to previous session, but ambulatory distances remain limited by pain & related fatigue/dyspnea  Anticipate this will improve as pain resides during remainder of his stay  POC and d/c plan remain appropriate with discharge home with increased social support & home PT follow up to progress to highest level of functional independence until cleared for increased activity  Goals   Patient Goals to have less pain   STG Expiration Date 03/13/22   PT Treatment Day 2   Plan   Treatment/Interventions Functional transfer training;LE strengthening/ROM; Elevations; Therapeutic exercise; Endurance training;Patient/family training;Equipment eval/education; Bed mobility;Gait training   Progress Progressing toward goals   PT Frequency 3-5x/wk   Recommendation   PT Discharge Recommendation Home with home health rehabilitation   Equipment Recommended Walker; Wheelchair   Wheelchair Package Recommended Standard   What would you like to CHANGE? Greater Weight Capacity (300 lb+); Different Leg Rest Type (Elevating Leg Rests)   Walker Package Recommended HD Bariatric wheeled walker   [address] Mobility Inpatient   Turning in Bed Without Bedrails 3   Lying on Back to Sitting on Edge of Flat Bed 3   Moving Bed to Chair 3   Standing Up From Chair 3   Walk in Room 3   Climb 3-5 Stairs 1   Basic Mobility Inpatient Raw Score 16   Basic Mobility Standardized Score 38 32   Highest Level Of Mobility   [facility] Goal 5: Stand one or more mins   The patient's AM-PAC Basic Mobility Inpatient Short Form Raw Score is 16  A Raw score of less than or equal to 16 suggests the patient may benefit from discharge to post-acute rehabilitation services  Please also refer to the recommendation of the Physical Therapist for safe discharge planning            Dillan Stanton PTA
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
## 2022-03-04 NOTE — ASSESSMENT & PLAN NOTE
No results found for: HGBA1C    Recent Labs     03/03/22  0731 03/03/22  2049 03/04/22  0718 03/04/22  1147   POCGLU 107 126 131 143*       Blood Sugar Average: Last 72 hrs:  (P) 149 [id number]     - continue sliding scale insulin  - close glucose control in the setting of orthopedic injury

## 2022-03-04 NOTE — ASSESSMENT & PLAN NOTE
- left tibial fracture, present on admission   - Appreciate Orthopedic surgery evaluation, recommendations and interventions as noted  - 2/25 s/p ex-fix the left lower extremity  - 3/3- ORIF L Tib/Fib  - Maintain non weightbearing status on the left lower extremity in external fixator  - Monitor left lower extremity neurovascular exam   - Continue multimodal analgesic regimen   - Continue DVT prophylaxis; 60 mg b i d   - PT and OT evaluation and treatment as indicated

## 2022-03-04 NOTE — PLAN OF CARE
Problem: MOBILITY - ADULT  Goal: Maintain or return to baseline ADL function  Description: INTERVENTIONS:  -  Assess patient's ability to carry out ADLs; assess patient's baseline for ADL function and identify physical deficits which impact ability to perform ADLs (bathing, care of mouth/teeth, toileting, grooming, dressing, etc )  - Assess/evaluate cause of self-care deficits   - Assess range of motion  - Assess patient's mobility; develop plan if impaired  - Assess patient's need for assistive devices and provide as appropriate  - Encourage maximum independence but intervene and supervise when necessary  - Involve family in performance of ADLs  - Assess for home care needs following discharge   - Consider OT consult to assist with ADL evaluation and planning for discharge  - Provide patient education as appropriate  Outcome: Progressing  Goal: Maintains/Returns to pre admission functional level  Description: INTERVENTIONS:  - Perform BMAT or MOVE assessment daily    - Set and communicate daily mobility goal to care team and patient/family/caregiver     - Collaborate with rehabilitation services on mobility goals if consulted  - Out of bed for toileting  - Record patient progress and toleration of activity level   Outcome: Progressing     Problem: PAIN - ADULT  Goal: Verbalizes/displays adequate comfort level or baseline comfort level  Description: Interventions:  - Encourage patient to monitor pain and request assistance  - Assess pain using appropriate pain scale  - Administer analgesics based on type and severity of pain and evaluate response  - Implement non-pharmacological measures as appropriate and evaluate response  - Consider cultural and social influences on pain and pain management  - Notify physician/advanced practitioner if interventions unsuccessful or patient reports new pain  Outcome: Progressing     Problem: INFECTION - ADULT  Goal: Absence or prevention of progression during hospitalization  Description: INTERVENTIONS:  - Assess and monitor for signs and symptoms of infection  - Monitor lab/diagnostic results  - Monitor all insertion sites, i e  indwelling lines, tubes, and drains  - [place] appropriate cooling/warming therapies per order  - Administer medications as ordered  - Instruct and encourage patient and family to use good hand hygiene technique  - Identify and instruct in appropriate isolation precautions for identified infection/condition  Outcome: Progressing  Goal: Absence of fever/infection during neutropenic period  Description: INTERVENTIONS:  - Monitor WBC    Outcome: Progressing
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
## 2022-03-04 NOTE — OP NOTE
OPERATIVE REPORT  PATIENT NAME: Tiffany Moritz    :  1995  MRN: [id number]  Pt Location: BE OR ROOM 04    SURGERY DATE: 3/3/2022    Surgeon(s) and Role:     * Lin Mcneal MD - Primary     * Mulu Dominguez PA-C - Assisting    Preop Diagnosis:  Displaced L tibia pilon fracture  Retained LLE external fixator    Post-Op Diagnosis Codes:  Same    Procedure(s) (LRB):  OPEN REDUCTION W/ INTERNAL FIXATION (ORIF) LEFT TIBIA PILON FRACTURE, REMOVAL OF EXTERNAL FIXATOR (Left)    Procedures:  ORIF L tibia pilon fracture (CPT 15469)  Removal LLE external fixator under anesthesia (CPT 76823)  Application LLE short leg splint (CPT 32428)    Specimen(s):  * No specimens in log *    Estimated Blood Loss:   Minimal    Drains:  [REMOVED] Urethral Catheter Latex 16 Fr  (Removed)   Number of days: 0       Anesthesia Type:   General    Operative Indications:  Displaced tibial  pilon fracture in ambulatory patient    Operative Findings:  See below    Complications:   None    Implants: Synthes 11 hl anterolateral distal tibia plate, multiple 2 7 mm cortical lag screws, 7 hl 3 5 mm LCP metaphyseal plate    Procedure and Technique:  The patient's operative site, laterality, procedure, consent were verified in the preop area and the patient was transitioned to the operating room  General anesthesia was provided and a nonsterile tourniquet was applied and deflated at proper intervals  A [place] catheter was placed which was removed at the end of the case  The operative extremity and external fixator were prepped and draped in the usual sterile fashion  Any adjustment to the external fixator was accompanied by betadine reprep of components  After a timeout for safety, the coronal cross bar was removed with its clamps  Incision for standard extensile anteromedial approach was made and full thickness flap was created medially to the level of the extensor retinaculum   The retinaculum was incised taking care to preserve the tib ant sheath within itself and the ant compartment musculature was retracted laterally taking care to protect n/v structures  Vertical arthrotomy was made at the area of the primary anterior fracture line  Joint was cleared of hematoma and debris  A small incision was made at the posterior aspect of the fibula for purpose of passing clamps  [place] elevator was used to create path to posterior tibia  Length and alignment was restored and to reconstruct articular surface, clamp was initially placed from anteromedial to posterolateral tibia and compressed across the joint and held with wires  Two 2 7 mm cortical lag screws were placed along this trajectory in standard drilling by technique fashion  The chaput fragment was then maneuvered into place and held with wires  2 7 mm cortical screw was used to compress this fracture line  An additional lag screw was placed anterior to posterior in medial bone stock to create compression perpendicular across the other fracture line at level of joint and to align apex of medial bone fragment with long obliquity of diaphyseal extension of the fracture  The chaput fracture was then maneuvered into place and a 2 7 cortical screw was placed to compress the articular surface  Attention was then turned to the diaphyseal extension/metaphyseal region of the fracture  There were 2-3 pieces of comminution at the metaphysis and a large proximal lateral butterfly component  Given the long obliquity of the proximal piece, decision was made to provide lag fixation of proximal lateral component and to buttress the medial component to prevent medial shear forces  The anterolateral plate was maneuvered into place and balanced fixation took place with cortical screws proximally and distally and a metaphyseal locking screw   The medial plate was then contoured and maneuvered into place (3 5 mm LCP metaphyseal 7 hl plate) to resist the medial shear force and cortical screws were placed proximally to provide buttress effect, cancellous screw distally to reduce plate to bone  Final images were satisfactory and found to show safe placement of screws  Copious irrigation then took place  The extensor retinaculum and fascia were repaired with 0 vicryl sutures, subcutaneous layer closed with 2-0 vicryl sutures, skin closed with 3-0 nylon sutures in true Donati snap-cut fashion  LAteral wound was irrigated and closed with ny niko sutures  Sterile dressings consisted of steri strips, adaptic, gauze, ABD pads, sterile webrill  The remainder of the external fixator components were removed, calc bolt was cut at the level of the heel skin with  and all pins removed without incidence  The patient was placed in a well padded posterior plaster short leg splint with stirrups  All final counts, including but not limited to instrument, sponge, needle counts were correct  I was present for the entire procedure  The patient was awakened, extubated, and transitioned to the pacu in stable condition  There were no immediate complications  There was no qualified resident available for the procedure  Critical assistance from Antoinette Watkins PA-C was required for all components of the case including but not limited to positioning, soft tissue retraction, reduction, passage of instrumentation  This was particularly important given the complex nature of the injury as well asp atient BMI of 50 61  The patient will be nonweight bearing on the operative extremity for 10-12 wks pending bony union  He will require lovenox for 6 wks  We will consider suture removal in 3 weeks      Patient Disposition:  PACU       SIGNATURE: Rosalinda Lambert MD  DATE: March 3, 2022  TIME: 8:22 PM
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
## 2022-03-04 NOTE — CASE MANAGEMENT
Case Management Progress Note    Patient name Tamar Hayward  Location [address] [place] [address] Rd 634/[facility] 653-52 MRN [id number]  : 1995 Date 3/4/2022       LOS (days): 9  Geometric Mean LOS (GMLOS) (days):   Days to GMLOS:        OBJECTIVE:        Current admission status: Inpatient  Preferred Pharmacy:   PATIENT/FAMILY REPORTS NO PREFERRED PHARMACY  No address on file      Primary Care Provider: Enedina Cavanaugh MD    Primary Insurance: WORKERS COMPENSATION  Secondary Insurance: [employer] [place] [employer]    PROGRESS NOTE:    Pt accepted by [facility]

## 2022-03-05 VITALS
TEMPERATURE: 98.9 F | RESPIRATION RATE: 18 BRPM | HEIGHT: 70 IN | SYSTOLIC BLOOD PRESSURE: 133 MMHG | HEART RATE: 94 BPM | DIASTOLIC BLOOD PRESSURE: 67 MMHG | OXYGEN SATURATION: 94 % | WEIGHT: 315 LBS | BODY MASS INDEX: 45.1 KG/M2

## 2022-03-05 LAB
ABO GROUP BLD BPU: NORMAL
ABO GROUP BLD BPU: NORMAL
BPU ID: NORMAL
BPU ID: NORMAL
CROSSMATCH: NORMAL
CROSSMATCH: NORMAL
GLUCOSE SERPL-MCNC: 131 MG/DL (ref 65–140)
GLUCOSE SERPL-MCNC: 138 MG/DL (ref 65–140)
UNIT DISPENSE STATUS: NORMAL
UNIT DISPENSE STATUS: NORMAL
UNIT PRODUCT CODE: NORMAL
UNIT PRODUCT CODE: NORMAL
UNIT PRODUCT VOLUME: 350 ML
UNIT PRODUCT VOLUME: 350 ML
UNIT RH: NORMAL
UNIT RH: NORMAL

## 2022-03-05 PROCEDURE — 99238 HOSP IP/OBS DSCHRG MGMT 30/<: CPT | Performed by: NURSE PRACTITIONER

## 2022-03-05 PROCEDURE — 82948 REAGENT STRIP/BLOOD GLUCOSE: CPT

## 2022-03-05 PROCEDURE — NC001 PR NO CHARGE: Performed by: ORTHOPAEDIC SURGERY

## 2022-03-05 RX ORDER — OXYCODONE HYDROCHLORIDE 10 MG/1
TABLET ORAL
Qty: 21 TABLET | Refills: 0 | Status: SHIPPED | OUTPATIENT
Start: 2022-03-05

## 2022-03-05 RX ORDER — ACETAMINOPHEN 325 MG/1
975 TABLET ORAL EVERY 8 HOURS PRN
Refills: 0
Start: 2022-03-05

## 2022-03-05 RX ORDER — SENNA AND DOCUSATE SODIUM 50; 8.6 MG/1; MG/1
1 TABLET, FILM COATED ORAL 2 TIMES DAILY
Qty: 28 TABLET | Refills: 0 | Status: SHIPPED | OUTPATIENT
Start: 2022-03-05 | End: 2022-03-19

## 2022-03-05 RX ADMIN — ATORVASTATIN CALCIUM 20 MG: 20 TABLET, FILM COATED ORAL at 09:41

## 2022-03-05 RX ADMIN — DOCUSATE SODIUM 100 MG: 100 CAPSULE ORAL at 09:41

## 2022-03-05 RX ADMIN — CARVEDILOL 25 MG: 25 TABLET, FILM COATED ORAL at 09:41

## 2022-03-05 RX ADMIN — ENOXAPARIN SODIUM 60 MG: 60 INJECTION SUBCUTANEOUS at 05:07

## 2022-03-05 RX ADMIN — OXYCODONE HYDROCHLORIDE 10 MG: 10 TABLET ORAL at 06:41

## 2022-03-05 RX ADMIN — SACUBITRIL AND VALSARTAN 1 TABLET: 97; 103 TABLET, FILM COATED ORAL at 09:41

## 2022-03-05 RX ADMIN — ACETAMINOPHEN 975 MG: 325 TABLET ORAL at 05:07

## 2022-03-05 RX ADMIN — ACETAMINOPHEN 975 MG: 325 TABLET ORAL at 13:29

## 2022-03-05 RX ADMIN — SPIRONOLACTONE 25 MG: 25 TABLET ORAL at 09:41

## 2022-03-05 RX ADMIN — OXYCODONE HYDROCHLORIDE 10 MG: 10 TABLET ORAL at 14:55

## 2022-03-05 NOTE — INCIDENTAL FINDINGS
The following findings require follow up:  Radiographic finding   Findin 2 cm nonspecific left adrenal gland nodule  It was discussed with patient that there was a 1 2 cm nonspecific left adrenal nodule noted on CT scan  Patient verbally confirmed understanding that the type of nodule is unknown and he is to follow-up with his primary care doctor regarding continued monitoring     Follow up required: PCP follow-up   Follow up should be done within 1 week(s)    Please notify the following clinician to assist with the follow up:   Dr Rafael Arango MD

## 2022-03-05 NOTE — PROGRESS NOTES
Progress Note - Orthopedics   Severa Hocking 31 y o  male MRN: [id number]  [facility]      Subjective:    26 y o male  No acute events, no complaints  Pt doing well  Pain controlled  Denies fevers chills, CP, SOB  Does have some numbness and tinlging in toes, received multiple exparel blocks yesterday for pain control      Labs:  0   Lab Value Date/Time    HCT 37 8 03/04/2022 0721    HCT 39 03/03/2022 1559    HCT 41 03/03/2022 1519    HCT 39 6 03/03/2022 0739    HCT 40 2 02/28/2022 0738    HGB 12 0 03/04/2022 0721    HGB 13 3 03/03/2022 1559    HGB 13 9 03/03/2022 1519    HGB 12 7 03/03/2022 0739    HGB 12 6 02/28/2022 0738    INR 0 97 03/03/2022 0833    WBC 11 07 (H) 03/04/2022 0721    WBC 8 25 03/03/2022 0739    WBC 9 46 02/28/2022 0738       Meds:    Current Facility-Administered Medications:     acetaminophen (TYLENOL) tablet 975 mg, 975 mg, Oral, Q8H [address], Fracisco Elizabeth PA-C, 975 mg at 03/05/22 0507    atorvastatin (LIPITOR) tablet 20 mg, 20 mg, Oral, Daily, Eloise Keyes PA-C, 20 mg at 03/04/22 1150    calcium carbonate (TUMS) chewable tablet 500 mg, 500 mg, Oral, Daily PRN, YENIFER Baker-C, 500 mg at 02/23/22 2328    carvedilol (COREG) tablet 25 mg, 25 mg, Oral, BID With Meals, Eloise Keyes PA-C, 25 mg at 03/04/22 1710    docusate sodium (COLACE) capsule 100 mg, 100 mg, Oral, BID, Fracisco Elizabeth PA-C, 100 mg at 03/04/22 1710    enoxaparin (LOVENOX) subcutaneous injection 60 mg, 60 mg, Subcutaneous, Q12H [address], Fracisco Elizabeth PA-C, 60 mg at 03/05/22 0507    hydrALAZINE (APRESOLINE) injection 5 mg, 5 mg, Intravenous, Q6H PRN, Avon Creamer, PA-C    HYDROmorphone (DILAUDID) injection 0 5 mg, 0 5 mg, Intravenous, Q4H PRN, Avon Creamer, PA-C, 0 5 mg at 03/04/22 0528    insulin lispro (HumaLOG) 100 units/mL subcutaneous injection 2-12 Units, 2-12 Units, Subcutaneous, TID With Meals, 6 Units at 03/04/22 1710 **AND** Fingerstick Glucose (POCT), , , 4x Daily AC and at bedtime, Stephie Gayle PA-C    oxyCODONE (ROXICODONE) immediate release tablet 10 mg, 10 mg, Oral, Q4H PRN, YENIFER Bradford-C, 10 mg at 03/05/22 0641    oxyCODONE (ROXICODONE) IR tablet 5 mg, 5 mg, Oral, Q4H PRN, YENIFER Bradford-C, 5 mg at 03/03/22 1859    polyethylene glycol (MIRALAX) packet 17 g, 17 g, Oral, Daily, YENIFER Qureshi-C, 17 g at 02/26/22 0829    sacubitril-valsartan (ENTRESTO)  MG per tablet 1 tablet, 1 tablet, Oral, BID, Stephie Gayle PA-C, 1 tablet at 03/04/22 1710    spironolactone (ALDACTONE) tablet 25 mg, 25 mg, Oral, Daily, Fracisco Elizabeth PA-C, 25 mg at 03/04/22 3968    Blood Culture:   No results found for: BLOODCX    Wound Culture:   No results found for: WOUNDCULT    Ins and Outs:  I/O last 24 hours: In: 2080 [P O :1840; I V :190; IV Piggyback:50]  Out: 3444 [Urine:1850]          Physical:  Vitals:    03/05/22 0313   BP: 101/53   Pulse: 84   Resp: 20   Temp: 98 °F (36 7 °C)   SpO2: 95%     Musculoskeletal: left Lower Extremity  · Skin warm dry tissues of lower leg soft depressible  · Tolerates passive stretch of toes without pain  · Dressing clean dry intact  · Mild TTP armen-incisionally  · SILT s/s/sp/dp/t  Weakness with EHL, good FHL  Ankle motion not tested due to splint  Toes warm brisk cap refill    Assessment:    26 y o male POD 1 left tibial pilon fracture ORIF, doing well      Plan:  · NWB LLE in splint  · No diagnosis of acute blood loss anemia, will monitor and administer IVF/prbc as indicated   · Ancef x 2 doses postop  · PT/OT  · Pain control  · DVT ppx - lovenox for 28 days postoperatively  · Dispo: Ortho will follow    Roxy Fortune MD

## 2022-03-05 NOTE — ASSESSMENT & PLAN NOTE
No results found for: HGBA1C    Recent Labs     03/04/22  1700 03/04/22  2143 03/05/22  0859 03/05/22  1207   POCGLU 294* 169* 131 138       Blood Sugar Average: Last 72 hrs:  (P) 156 [id number]     - continue sliding scale insulin  - close glucose control in the setting of orthopedic injury
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
## 2022-03-05 NOTE — DISCHARGE INSTRUCTIONS
Discharge Instructions - Orthopedics  Gm New 31 y o  male MRN: [id number]  Unit/Bed#: [facility] 634-01    Weight Bearing Status: The patient will be nonweight bearing on the left lower extremity for 10-12 wks     DVT prophylaxis    Lovenox for 6 weeks    Pain:  Continue analgesics as directed  · Tylenol up to 1000 mg every 8 hours for mild to moderate pain  · Ice - 20 minutes on and 20 minutes off  · Elevation - foot should be above level of heart to minimize swelling    Dressing Instructions:   Please keep clean, dry and intact until follow up     Appt Instructions:    If you do not have your appointment, please call the clinic at [phone] to see Dr Srinivas Steinberg in 2-3 weeks  Otherwise followup as scheduled     Contact the office sooner if you experience any increased numbness/tingling in the extremities, uncontrolled pain

## 2022-03-05 NOTE — ASSESSMENT & PLAN NOTE
- left tibial fracture, present on admission   - Appreciate Orthopedic surgery evaluation, recommendations and interventions as noted  - 2/25 s/p ex-fix the left lower extremity  - 3/3- ORIF L Tib/Fib  - Maintain non weightbearing status on the left lower extremity in external fixator  - Monitor left lower extremity neurovascular exam   - Continue multimodal analgesic regimen   - Continue DVT prophylaxis; 60 mg b i d  x28 days- post op  - PT and OT evaluation and treatment as indicated

## 2022-03-07 LAB
DME PARACHUTE DELIVERY DATE ACTUAL: NORMAL
DME PARACHUTE DELIVERY DATE REQUESTED: NORMAL
DME PARACHUTE ITEM DESCRIPTION: NORMAL
DME PARACHUTE ITEM DESCRIPTION: NORMAL
DME PARACHUTE ORDER STATUS: NORMAL
DME PARACHUTE SUPPLIER NAME: NORMAL
DME PARACHUTE SUPPLIER PHONE: NORMAL

## 2022-03-09 ENCOUNTER — TELEPHONE (OUTPATIENT)
Dept: OBGYN CLINIC | Facility: HOSPITAL | Age: 27
End: 2022-03-09

## 2022-03-09 NOTE — TELEPHONE ENCOUNTER
DR Nolon Bamberger  RE: [facility] OT  CB: [phone] for [facility] called stating patient is at max WB potential   She would like to let physician know when he is able to bear more weight they will need to reorder OT if necessary

## 2022-03-11 ENCOUNTER — TELEPHONE (OUTPATIENT)
Dept: OBGYN CLINIC | Facility: MEDICAL CENTER | Age: 27
End: 2022-03-11

## 2022-03-11 NOTE — TELEPHONE ENCOUNTER
Patient sees Dr David Way at the [facility] is calling about this patient is cleared for any range of motion on his left leg? She wants to know if needs to be in the boot for any range of motion?        # [phone]
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
## 2022-03-13 DIAGNOSIS — S82.892A CLOSED FRACTURE OF LEFT ANKLE, INITIAL ENCOUNTER: Primary | ICD-10-CM

## 2022-03-18 ENCOUNTER — HOSPITAL ENCOUNTER (OUTPATIENT)
Dept: RADIOLOGY | Facility: HOSPITAL | Age: 27
Discharge: HOME | End: 2022-03-18
Attending: ORTHOPAEDIC SURGERY

## 2022-03-18 ENCOUNTER — OFFICE VISIT (OUTPATIENT)
Dept: OBGYN CLINIC | Facility: HOSPITAL | Age: 27
End: 2022-03-18

## 2022-03-18 VITALS
SYSTOLIC BLOOD PRESSURE: 150 MMHG | BODY MASS INDEX: 45.1 KG/M2 | DIASTOLIC BLOOD PRESSURE: 103 MMHG | HEART RATE: 103 BPM | HEIGHT: 70 IN | WEIGHT: 315 LBS

## 2022-03-18 DIAGNOSIS — Z87.81 S/P ORIF (OPEN REDUCTION INTERNAL FIXATION) FRACTURE: Primary | ICD-10-CM

## 2022-03-18 DIAGNOSIS — S82.892A CLOSED FRACTURE OF LEFT ANKLE, INITIAL ENCOUNTER: ICD-10-CM

## 2022-03-18 DIAGNOSIS — Z98.890 S/P ORIF (OPEN REDUCTION INTERNAL FIXATION) FRACTURE: Primary | ICD-10-CM

## 2022-03-18 PROCEDURE — 73610 X-RAY EXAM OF ANKLE: CPT

## 2022-03-18 PROCEDURE — 99024 POSTOP FOLLOW-UP VISIT: CPT | Performed by: ORTHOPAEDIC SURGERY

## 2022-03-18 RX ORDER — OXYCODONE HYDROCHLORIDE 5 MG/1
5 TABLET ORAL EVERY 6 HOURS PRN
Qty: 28 TABLET | Refills: 0 | Status: SHIPPED | OUTPATIENT
Start: 2022-03-18 | End: 2022-03-25

## 2022-03-18 RX ORDER — CEPHALEXIN 500 MG/1
500 CAPSULE ORAL 2 TIMES DAILY
Qty: 14 CAPSULE | Refills: 0 | Status: SHIPPED | OUTPATIENT
Start: 2022-03-18 | End: 2022-03-25

## 2022-03-18 NOTE — PROGRESS NOTES
Orthopaedics Office Visit - Post-op Patient Visit    ASSESSMENT/PLAN:    Assessment:   3 weeks s/p application of external fixator left ankle, DOS 2/25/22  15 days s/p ORIF left tibia pilon fracture, removal of external fixator, DOS 3/3/22    Plan:   · X-rays were performed in the office today and reviewed   · Post op dressing/steri strips were removed   · Lateral sutures were removed in the office today, remaining sutures will remain for 1 more week as there is some serous drainage   · He does have mild drainage, Keflex 500 MG was prescribed and sent to his pharmacy electronically, to be taken 2x a day for 7 days    · 5 MG of Oxycodone was prescribed and sent to pharmacy electronically to be taken every 6 hours as needed for pain control, advised to take OTC pain medication as well    · Fit with a podous boot, may work on ankle ROM, advised to sleep in the boot   · Continue NWB to LLE   · Continue Lovenox injections for 4 more weeks for DVT prophylaxis  · Follow up in 1 weeks time for a wound check and suture removal      To Do Next Visit:  Suture removal     _____________________________________________________  CHIEF COMPLAINT:  Chief Complaint   Patient presents with    Left Ankle - Post-op         SUBJECTIVE:  Stan Gresham  is a 31 y o  male who presents to the office 3 weeks s/p application of external fixator LLE, DOS 2/25/22 and 15 days s/p ORIF left tibia pilon fracture, removal of external fixator, DOS 3/3/22  Denies any fevers or chills, no chest tightness or SOB out of baseline  He has been NWB to LLE  He denies any significant pain today  He is taking Oxycodone as needed for pain control  He is performing Lovenox injections 2x a day for DVT prophylaxis  SOCIAL HISTORY:  Social History     Tobacco Use    Smoking status: Never Smoker    Smokeless tobacco: Never Used   Vaping Use    Vaping Use: Never used   Substance Use Topics    Alcohol use: No    Drug use:  No MEDICATIONS:    Current Outpatient Medications:     acetaminophen (TYLENOL) 325 mg tablet, Take 3 tablets (975 mg total) by mouth every 8 (eight) hours as needed for mild pain, Disp: , Rfl: 0    Albuterol (PROVENTIL IN), Inhale, Disp: , Rfl:     atenolol (TENORMIN) 25 mg tablet, Take 1 tablet (25 mg total) by mouth daily, Disp: 30 tablet, Rfl: 5    atorvastatin (LIPITOR) 20 mg tablet, Take 20 mg by mouth daily, Disp: , Rfl:     carvedilol (COREG) 25 mg tablet, Take 25 mg by mouth 2 (two) times a day with meals, Disp: , Rfl:     Empagliflozin-metFORMIN HCl (Synjardy) 12 5-500 MG TABS, Take by mouth 2 (two) times a day, Disp: , Rfl:     enalapril (VASOTEC) 5 mg tablet, Take 1 tablet (5 mg total) by mouth 2 (two) times a day, Disp: 30 tablet, Rfl: 5    enoxaparin (LOVENOX) 60 mg/0 6 mL, Inject 0 6 mL (60 mg total) under the skin every 12 (twelve) hours, Disp: 50 4 mL, Rfl: 0    isosorbide-hydrALAZINE (BIDIL) 20-37 5 MG per tablet, Take 2 tablets by mouth 3 (three) times a day, Disp: , Rfl:     ivabradine HCl (Corlanor) 5 MG tablet, Take 5 mg by mouth 2 (two) times a day, Disp: , Rfl:     oxyCODONE (ROXICODONE) 10 MG TABS, You may take 5 mg (0 5 tab) for moderate pain or 10 mg (1 tab) for severe pain, every 4 hours, as needed  , Disp: 21 tablet, Rfl: 0    sacubitril-valsartan (Entresto)  MG TABS, Take 1 tablet by mouth 2 (two) times a day, Disp: , Rfl:     Semaglutide (OZEMPIC, 0 25 OR 0 5 MG/DOSE, SC), Inject 0 25 mg under the skin every 7 days, Disp: , Rfl:     senna-docusate sodium (SENOKOT-S) 8 6-50 mg per tablet, Take 1 tablet by mouth 2 (two) times a day for 14 days Continue to take while taking Dilaudid , Disp: 28 tablet, Rfl: 0    spironolactone (ALDACTONE) 25 mg tablet, Take 25 mg by mouth daily, Disp: , Rfl:     REVIEW OF SYSTEMS:  MSK: as noted in HPI  Neuro: WNL's   Pertinent items are otherwise noted in HPI    A comprehensive review of systems was otherwise negative     _____________________________________________________  PHYSICAL EXAMINATION:  Vital signs: BP (!) 150/103   Pulse 103   Ht 5' 10" (1 778 m)   Wt (!) 160 kg (352 lb)   BMI 50 51 kg/m²   General: No acute distress, awake and alert  Psychiatric: Mood and affect appear appropriate  HEENT: Trachea Midline, No torticollis, no apparent facial trauma  Cardiovascular: No audible murmurs; Extremities appear perfused  Pulmonary: No audible wheezing or stridor  Skin: No open lesions; see further details (if any) below    MUSCULOSKELETAL EXAMINATION:    Extremities: Left ankle:   No erythema or ecchymosis   Edema noted   Sutures are intact   Serous drainage noted   Able to wiggle toes   Skin appears warm and well perfused   Presents in a wheelchair       _____________________________________________________  STUDIES REVIEWED:  I personally reviewed the images and interpretation is as follows: x-ray left ankle demonstrates orthopedic hardware in good alignment and position, no concern for loosening or failure          PROCEDURES PERFORMED:  Procedures      Scribe Attestation    I,:  Yasmine Cabrales am acting as a scribe while in the presence of the attending physician :       I,:  Tariq Zhu MD personally performed the services described in this documentation    as scribed in my presence :

## 2022-03-21 ENCOUNTER — TELEPHONE (OUTPATIENT)
Dept: OBGYN CLINIC | Facility: HOSPITAL | Age: 27
End: 2022-03-21

## 2022-03-21 NOTE — TELEPHONE ENCOUNTER
Patient sees Dr Taniya Aguilera is calling in from Melissa Benites the nurse  wanting to get the office notes from 3/18 faxed over to her at [phone]  Also wanted to know if this patient has any upcoming appointments made

## 2022-03-23 ENCOUNTER — TELEPHONE (OUTPATIENT)
Dept: OBGYN CLINIC | Facility: HOSPITAL | Age: 27
End: 2022-03-23

## 2022-03-23 NOTE — TELEPHONE ENCOUNTER
Kavitha Greene with [facility]  had scheduled to see the patient last Friday, but he cancelled the appointment  She only saw him once last week  Additionally, she would like clarification regarding the patient's boot  She knows he should wear it for sleep  Should he be wearing his podous boot during any other activities during the day? Please advise Chaya Larkin's cb # [phone]

## 2022-03-25 ENCOUNTER — OFFICE VISIT (OUTPATIENT)
Dept: OBGYN CLINIC | Facility: HOSPITAL | Age: 27
End: 2022-03-25

## 2022-03-25 VITALS — WEIGHT: 315 LBS | HEIGHT: 70 IN | BODY MASS INDEX: 45.1 KG/M2

## 2022-03-25 DIAGNOSIS — Z98.890 S/P ORIF (OPEN REDUCTION INTERNAL FIXATION) FRACTURE: Primary | ICD-10-CM

## 2022-03-25 DIAGNOSIS — Z87.81 S/P ORIF (OPEN REDUCTION INTERNAL FIXATION) FRACTURE: Primary | ICD-10-CM

## 2022-03-25 PROCEDURE — 99024 POSTOP FOLLOW-UP VISIT: CPT | Performed by: ORTHOPAEDIC SURGERY

## 2022-03-25 NOTE — PROGRESS NOTES
Orthopaedics Office Visit - Post op Patient Visit    ASSESSMENT/PLAN:    Assessment:   4 weeks s/p application of external fixator left ankle, DOS 2/25/2022  3 weeks s/p ORIF left tibia pilon fracture, removal of external fixator, DOS 3/3/2022    Plan:   Remaining sutures were removed today and steri strips applied  Continue use of potus boot  Continue to work on ankle ROM in PT/HEP  Will remain NWB to LLE  Continue use of prescribed Keflex for another 3 days  Follow up in 3 weeks for re evaluation of current issue    To Do Next Visit:  Repeat x rays of the left ankle    _____________________________________________________  CHIEF COMPLAINT:  Chief Complaint   Patient presents with    Left Ankle - Post-op         SUBJECTIVE:  Tess West  is a 31 y o  male who presents  Today 3 weeks status post above-mentioned surgical procedure  Today, patient states that he has been compliant with his nonweightbearing status as well as the use of his Podus boot  He also states that he has been taking his Keflex as prescribed and has not noticed any drainage since his last visit  No numbness or tingling  No fevers or chills      PAST MEDICAL HISTORY:  Past Medical History:   Diagnosis Date    Enchondroma of hand bone     last assessed: 4/21/2015    Heart trouble     Hypertension     Palpitations        PAST SURGICAL HISTORY:  Past Surgical History:   Procedure Laterality Date    EXTERNAL FIXATOR APPLICATION Left 9/12/8646    Procedure: APPLICATION EXTERNAL FIXATION DEVICE LOWER EXTREMITY;  Surgeon: Oksana Benitez MD;  [facility];  Service: Orthopedics    NO PAST SURGERIES      ORIF TIBIA FRACTURE Left 3/3/2022    Procedure: OPEN REDUCTION W/ INTERNAL FIXATION (ORIF) LEFT TIBIA PILON FRACTURE, REMOVAL OF EXTERNAL FIXATOR;  Surgeon: Oksana Benitez MD;  [facility];  Service: Orthopedics       FAMILY HISTORY:  Family History   Problem Relation Age of Onset    No Known Problems Mother  No Known Problems Father     Autism Brother         autistic diosrder    Diabetes type II Paternal Grandmother         mellitus    Autism Brother         autistic diosrder    Depression Other        SOCIAL HISTORY:  Social History     Tobacco Use    Smoking status: Never Smoker    Smokeless tobacco: Never Used   Vaping Use    Vaping Use: Never used   Substance Use Topics    Alcohol use: No    Drug use: No       MEDICATIONS:    Current Outpatient Medications:     acetaminophen (TYLENOL) 325 mg tablet, Take 3 tablets (975 mg total) by mouth every 8 (eight) hours as needed for mild pain, Disp: , Rfl: 0    Albuterol (PROVENTIL IN), Inhale, Disp: , Rfl:     atenolol (TENORMIN) 25 mg tablet, Take 1 tablet (25 mg total) by mouth daily, Disp: 30 tablet, Rfl: 5    atorvastatin (LIPITOR) 20 mg tablet, Take 20 mg by mouth daily, Disp: , Rfl:     carvedilol (COREG) 25 mg tablet, Take 25 mg by mouth 2 (two) times a day with meals, Disp: , Rfl:     cephalexin (KEFLEX) 500 mg capsule, Take 1 capsule (500 mg total) by mouth 2 (two) times a day for 7 days, Disp: 14 capsule, Rfl: 0    Empagliflozin-metFORMIN HCl (Synjardy) 12 5-500 MG TABS, Take by mouth 2 (two) times a day, Disp: , Rfl:     enalapril (VASOTEC) 5 mg tablet, Take 1 tablet (5 mg total) by mouth 2 (two) times a day, Disp: 30 tablet, Rfl: 5    enoxaparin (LOVENOX) 60 mg/0 6 mL, Inject 0 6 mL (60 mg total) under the skin every 12 (twelve) hours, Disp: 50 4 mL, Rfl: 0    isosorbide-hydrALAZINE (BIDIL) 20-37 5 MG per tablet, Take 2 tablets by mouth 3 (three) times a day, Disp: , Rfl:     ivabradine HCl (Corlanor) 5 MG tablet, Take 5 mg by mouth 2 (two) times a day, Disp: , Rfl:     oxyCODONE (ROXICODONE) 10 MG TABS, You may take 5 mg (0 5 tab) for moderate pain or 10 mg (1 tab) for severe pain, every 4 hours, as needed  , Disp: 21 tablet, Rfl: 0    oxyCODONE (Roxicodone) 5 immediate release tablet, Take 1 tablet (5 mg total) by mouth every 6 (six) hours as needed for moderate pain for up to 7 days Max Daily Amount: 20 mg, Disp: 28 tablet, Rfl: 0    sacubitril-valsartan (Entresto)  MG TABS, Take 1 tablet by mouth 2 (two) times a day, Disp: , Rfl:     Semaglutide (OZEMPIC, 0 25 OR 0 5 MG/DOSE, SC), Inject 0 25 mg under the skin every 7 days, Disp: , Rfl:     spironolactone (ALDACTONE) 25 mg tablet, Take 25 mg by mouth daily, Disp: , Rfl:     senna-docusate sodium (SENOKOT-S) 8 6-50 mg per tablet, Take 1 tablet by mouth 2 (two) times a day for 14 days Continue to take while taking Dilaudid , Disp: 28 tablet, Rfl: 0    ALLERGIES:  Allergies   Allergen Reactions    Banana - Food Allergy Other (See Comments)          Bee Venom Other (See Comments)          Pollen Extract        REVIEW OF SYSTEMS:  MSK: Left Ankle pain  Neuro: WNL  Pertinent items are otherwise noted in HPI  A comprehensive review of systems was otherwise negative  LABS:  HgA1c:   Lab Results   Component Value Date    HGBA1C 7 3 (H) 07/22/2021     BMP:   Lab Results   Component Value Date    GLUCOSE 204 (H) 03/03/2022    CALCIUM 9 2 03/04/2022    K 4 4 03/04/2022    CO2 25 03/04/2022     03/04/2022    BUN 16 03/04/2022    CREATININE 1 13 03/04/2022     CBC: No components found for: CBC    _____________________________________________________  PHYSICAL EXAMINATION:  Vital signs: Ht 5' 10" (1 778 m)   Wt (!) 160 kg (352 lb)   BMI 50 51 kg/m²   General: No acute distress, awake and alert  Psychiatric: Mood and affect appear appropriate  HEENT: Trachea Midline, No torticollis, no apparent facial trauma  Cardiovascular: No audible murmurs; Extremities appear perfused  Pulmonary: No audible wheezing or stridor  Skin: No open lesions; see further details (if any) below    MUSCULOSKELETAL EXAMINATION:  Extremities: Left Ankle:  Skin is intact  No erythema or ecchymosis  No active drainage  Incision is C/D/I without signs of infection  Able to wiggle toes     Skin appears warm and well perfused  Presents in wheelchair       _____________________________________________________  STUDIES REVIEWED:  I personally reviewed the images and interpretation is as follows:     No new imaging performed today    PROCEDURES PERFORMED:  Procedures   None performed today    Scribe Attestation    I,:  Max Dong am acting as a scribe while in the presence of the attending physician :       I,:  Damion Watkins MD personally performed the services described in this documentation    as scribed in my presence :

## 2022-03-28 ENCOUNTER — TELEPHONE (OUTPATIENT)
Dept: OBGYN CLINIC | Facility: HOSPITAL | Age: 27
End: 2022-03-28

## 2022-03-28 NOTE — TELEPHONE ENCOUNTER
Danae called from [facility] for the OVN   Faxed to [phone]    Please provide a work status and fax to [phone]

## 2022-03-31 ENCOUNTER — TELEPHONE (OUTPATIENT)
Dept: OBGYN CLINIC | Facility: HOSPITAL | Age: 27
End: 2022-03-31

## 2022-03-31 DIAGNOSIS — Z87.81 S/P ORIF (OPEN REDUCTION INTERNAL FIXATION) FRACTURE: Primary | ICD-10-CM

## 2022-03-31 DIAGNOSIS — Z98.890 S/P ORIF (OPEN REDUCTION INTERNAL FIXATION) FRACTURE: Primary | ICD-10-CM

## 2022-03-31 RX ORDER — SULFAMETHOXAZOLE AND TRIMETHOPRIM 800; 160 MG/1; MG/1
1 TABLET ORAL EVERY 12 HOURS SCHEDULED
Qty: 20 TABLET | Refills: 0 | Status: SHIPPED | OUTPATIENT
Start: 2022-03-31 | End: 2022-04-10

## 2022-03-31 NOTE — TELEPHONE ENCOUNTER
Spoke to patient  Advised to take the bactrim as prescribed once he is finished with the keflex  Understanding verbalized  No further questions or concerns at this time

## 2022-03-31 NOTE — TELEPHONE ENCOUNTER
Spoke to patient  He reports that he noticed a slight odor coming from his incision when he was showering today  Denies purulent drainage, redness, heat to touch, or irritation of the incision  Stated he does have a small amount of clear yellow drainage coming from the incision  Advised that he should cover incision if draining  He stated he is still taking keflex, he has 3 pills left  Please advise

## 2022-03-31 NOTE — TELEPHONE ENCOUNTER
Patient is calling to report a slight odor  Patient states Dr Jered Kemp asked him to call to report anything different  Call transferred to the triage nurse

## 2022-04-06 ENCOUNTER — TELEPHONE (OUTPATIENT)
Dept: OBGYN CLINIC | Facility: CLINIC | Age: 27
End: 2022-04-06

## 2022-04-06 DIAGNOSIS — S82.892A CLOSED FRACTURE OF LEFT ANKLE, INITIAL ENCOUNTER: ICD-10-CM

## 2022-04-06 DIAGNOSIS — Z98.890 S/P ORIF (OPEN REDUCTION INTERNAL FIXATION) FRACTURE: Primary | ICD-10-CM

## 2022-04-06 DIAGNOSIS — Z87.81 S/P ORIF (OPEN REDUCTION INTERNAL FIXATION) FRACTURE: Primary | ICD-10-CM

## 2022-04-06 NOTE — TELEPHONE ENCOUNTER
Dr Sherren Rolling from [facility] called to let you know that [facility] and the next one he cx'd due to illness; his next appt is 4/7/22  Since he is allowed ROM she wants to send more orders for PT since the current ones have run out for your signature and return    Any questions you may call her [phone]  Thank you

## 2022-04-09 DIAGNOSIS — Z98.890 S/P ORIF (OPEN REDUCTION INTERNAL FIXATION) FRACTURE: Primary | ICD-10-CM

## 2022-04-09 DIAGNOSIS — Z87.81 S/P ORIF (OPEN REDUCTION INTERNAL FIXATION) FRACTURE: Primary | ICD-10-CM

## 2022-04-11 ENCOUNTER — TELEPHONE (OUTPATIENT)
Dept: OBGYN CLINIC | Facility: MEDICAL CENTER | Age: 27
End: 2022-04-11

## 2022-04-11 NOTE — TELEPHONE ENCOUNTER
Patient sees Dr Jeremy Dowell at [facility] release patient from Physical therapy [address] on Friday 4/8/2022  She is recommending further Op Physical Therapy once he is cleared to start putting weight on left leg      # [phone]

## 2022-04-15 ENCOUNTER — HOSPITAL ENCOUNTER (OUTPATIENT)
Dept: RADIOLOGY | Facility: HOSPITAL | Age: 27
Discharge: HOME | End: 2022-04-15
Attending: ORTHOPAEDIC SURGERY
Payer: MEDICARE

## 2022-04-15 ENCOUNTER — OFFICE VISIT (OUTPATIENT)
Dept: OBGYN CLINIC | Facility: HOSPITAL | Age: 27
End: 2022-04-15

## 2022-04-15 VITALS
BODY MASS INDEX: 45.1 KG/M2 | HEIGHT: 70 IN | WEIGHT: 315 LBS | SYSTOLIC BLOOD PRESSURE: 151 MMHG | DIASTOLIC BLOOD PRESSURE: 98 MMHG | HEART RATE: 99 BPM

## 2022-04-15 DIAGNOSIS — Z87.81 S/P ORIF (OPEN REDUCTION INTERNAL FIXATION) FRACTURE: Primary | ICD-10-CM

## 2022-04-15 DIAGNOSIS — Z87.81 S/P ORIF (OPEN REDUCTION INTERNAL FIXATION) FRACTURE: ICD-10-CM

## 2022-04-15 DIAGNOSIS — Z98.890 S/P ORIF (OPEN REDUCTION INTERNAL FIXATION) FRACTURE: ICD-10-CM

## 2022-04-15 DIAGNOSIS — Z98.890 S/P ORIF (OPEN REDUCTION INTERNAL FIXATION) FRACTURE: Primary | ICD-10-CM

## 2022-04-15 PROCEDURE — 73610 X-RAY EXAM OF ANKLE: CPT

## 2022-04-15 PROCEDURE — 99024 POSTOP FOLLOW-UP VISIT: CPT | Performed by: ORTHOPAEDIC SURGERY

## 2022-04-15 NOTE — PROGRESS NOTES
Orthopaedics Office Visit - Post op Patient Visit    ASSESSMENT/PLAN:    Assessment:   7 weeks status post application of external fixator left ankle performed on 02/25/2022   6 weeks status post ORIF left P line fracture, removal of external fixator performed on 03/03/2022    Plan:   Continue use of Podus boot  Continue to be nonweightbearing  Follow-up in 4 weeks  To Do Next Visit:  Will begin formal physical therapy for range of motion strengthening exercise as tolerated  Will likely transition to weight-bearing as tolerated and Cam boot  X-rays of the left ankle be performed at this time    _____________________________________________________  CHIEF COMPLAINT:  Chief Complaint   Patient presents with    Left Ankle - Post-op         SUBJECTIVE:  King Segura  is a 31 y o  male who presents  Today 6 weeks status post above-mentioned surgical procedures  Today, he notes that his pain is well controlled  He continues to be compliant with his nonweightbearing status in the Podus boot  He denies any drainage of the anterior incision  No numbness or tingling  No fevers or chills      PAST MEDICAL HISTORY:  Past Medical History:   Diagnosis Date    Enchondroma of hand bone     last assessed: 4/21/2015    Heart trouble     Hypertension     Palpitations        PAST SURGICAL HISTORY:  Past Surgical History:   Procedure Laterality Date    EXTERNAL FIXATOR APPLICATION Left 3/61/7669    Procedure: APPLICATION EXTERNAL FIXATION DEVICE LOWER EXTREMITY;  Surgeon: Bg Lyon MD;  [facility];  Service: Orthopedics    NO PAST SURGERIES      ORIF TIBIA FRACTURE Left 3/3/2022    Procedure: OPEN REDUCTION W/ INTERNAL FIXATION (ORIF) LEFT TIBIA PILON FRACTURE, REMOVAL OF EXTERNAL FIXATOR;  Surgeon: Bg Lyon MD;  [facility];  Service: Orthopedics       FAMILY HISTORY:  Family History   Problem Relation Age of Onset    No Known Problems Mother     No Known Problems Father  Autism Brother         autistic diosrder    Diabetes type II Paternal Grandmother         mellitus    Autism Brother         autistic diosrder    Depression Other        SOCIAL HISTORY:  Social History     Tobacco Use    Smoking status: Never Smoker    Smokeless tobacco: Never Used   Vaping Use    Vaping Use: Never used   Substance Use Topics    Alcohol use: No    Drug use: No       MEDICATIONS:    Current Outpatient Medications:     acetaminophen (TYLENOL) 325 mg tablet, Take 3 tablets (975 mg total) by mouth every 8 (eight) hours as needed for mild pain, Disp: , Rfl: 0    Albuterol (PROVENTIL IN), Inhale, Disp: , Rfl:     atenolol (TENORMIN) 25 mg tablet, Take 1 tablet (25 mg total) by mouth daily, Disp: 30 tablet, Rfl: 5    atorvastatin (LIPITOR) 20 mg tablet, Take 20 mg by mouth daily, Disp: , Rfl:     carvedilol (COREG) 25 mg tablet, Take 25 mg by mouth 2 (two) times a day with meals, Disp: , Rfl:     Empagliflozin-metFORMIN HCl (Synjardy) 12 5-500 MG TABS, Take by mouth 2 (two) times a day, Disp: , Rfl:     enalapril (VASOTEC) 5 mg tablet, Take 1 tablet (5 mg total) by mouth 2 (two) times a day, Disp: 30 tablet, Rfl: 5    enoxaparin (LOVENOX) 60 mg/0 6 mL, Inject 0 6 mL (60 mg total) under the skin every 12 (twelve) hours, Disp: 50 4 mL, Rfl: 0    isosorbide-hydrALAZINE (BIDIL) 20-37 5 MG per tablet, Take 2 tablets by mouth 3 (three) times a day, Disp: , Rfl:     ivabradine HCl (Corlanor) 5 MG tablet, Take 5 mg by mouth 2 (two) times a day, Disp: , Rfl:     oxyCODONE (ROXICODONE) 10 MG TABS, You may take 5 mg (0 5 tab) for moderate pain or 10 mg (1 tab) for severe pain, every 4 hours, as needed  , Disp: 21 tablet, Rfl: 0    sacubitril-valsartan (Entresto)  MG TABS, Take 1 tablet by mouth 2 (two) times a day, Disp: , Rfl:     Semaglutide (OZEMPIC, 0 25 OR 0 5 MG/DOSE, SC), Inject 0 25 mg under the skin every 7 days, Disp: , Rfl:     senna-docusate sodium (SENOKOT-S) 8 6-50 mg per tablet, Take 1 tablet by mouth 2 (two) times a day for 14 days Continue to take while taking Dilaudid , Disp: 28 tablet, Rfl: 0    spironolactone (ALDACTONE) 25 mg tablet, Take 25 mg by mouth daily, Disp: , Rfl:     ALLERGIES:  Allergies   Allergen Reactions    Banana - Food Allergy Other (See Comments)          Bee Venom Other (See Comments)          Pollen Extract        REVIEW OF SYSTEMS:  MSK: Left ankle pain  Neuro: WNL  Pertinent items are otherwise noted in HPI  A comprehensive review of systems was otherwise negative  LABS:  HgA1c:   Lab Results   Component Value Date    HGBA1C 6 6 (H) 04/09/2022     BMP:   Lab Results   Component Value Date    GLUCOSE 204 (H) 03/03/2022    CALCIUM 9 2 03/04/2022    K 4 4 03/04/2022    CO2 25 03/04/2022     03/04/2022    BUN 16 03/04/2022    CREATININE 1 13 03/04/2022     CBC: No components found for: CBC    _____________________________________________________  PHYSICAL EXAMINATION:  Vital signs: There were no vitals taken for this visit  General: No acute distress, awake and alert  Psychiatric: Mood and affect appear appropriate  HEENT: Trachea Midline, No torticollis, no apparent facial trauma  Cardiovascular: No audible murmurs; Extremities appear perfused  Pulmonary: No audible wheezing or stridor  Skin: No open lesions; see further details (if any) below    MUSCULOSKELETAL EXAMINATION:  Extremities:  Left ankle examination  Skin is intact  No erythema or ecchymosis  Fibronus tissue noted about the anterior incision  No active drainage  ROM of the ankle limited but without pain  Sensation is intact distally  2+ DP Pulse     _____________________________________________________  STUDIES REVIEWED:  I personally reviewed the images and interpretation is as follows:  X-rays of the left ankle 04/15/2022 demonstrates anatomic alignment of the ankle mortise with orthopedic hardware in stable alignment position    Proper healing/callus formation present      PROCEDURES PERFORMED:  Procedures   None performed today    Scribe Attestation    I,:  Carlos Manzano am acting as a scribe while in the presence of the attending physician :       I,:  Andrea Dela Cruz MD personally performed the services described in this documentation    as scribed in my presence :

## 2022-04-19 ENCOUNTER — TELEPHONE (OUTPATIENT)
Dept: OBGYN CLINIC | Facility: HOSPITAL | Age: 27
End: 2022-04-19

## 2022-04-19 NOTE — TELEPHONE ENCOUNTER
Electronically faxed 4/15 OVN to [address] [place] [address] from [facility]      Fax # [phone]

## 2022-05-04 ENCOUNTER — TRANSCRIBE ORDERS (OUTPATIENT)
Dept: HOME HEALTH SERVICES | Facility: HOME HEALTHCARE | Age: 27
End: 2022-05-04

## 2022-05-04 ENCOUNTER — HOME HEALTH ADMISSION (OUTPATIENT)
Dept: HOME HEALTH SERVICES | Facility: HOME HEALTHCARE | Age: 27
End: 2022-05-04

## 2022-05-04 DIAGNOSIS — I10 ESSENTIAL HYPERTENSION, MALIGNANT: Primary | ICD-10-CM

## 2022-05-07 DIAGNOSIS — Z98.890 S/P ORIF (OPEN REDUCTION INTERNAL FIXATION) FRACTURE: Primary | ICD-10-CM

## 2022-05-07 DIAGNOSIS — Z87.81 S/P ORIF (OPEN REDUCTION INTERNAL FIXATION) FRACTURE: Primary | ICD-10-CM

## 2022-05-13 ENCOUNTER — HOSPITAL ENCOUNTER (OUTPATIENT)
Dept: RADIOLOGY | Facility: HOSPITAL | Age: 27
Discharge: HOME | End: 2022-05-13
Attending: ORTHOPAEDIC SURGERY
Payer: MEDICARE

## 2022-05-13 ENCOUNTER — OFFICE VISIT (OUTPATIENT)
Dept: OBGYN CLINIC | Facility: HOSPITAL | Age: 27
End: 2022-05-13

## 2022-05-13 VITALS
DIASTOLIC BLOOD PRESSURE: 113 MMHG | HEART RATE: 123 BPM | BODY MASS INDEX: 45.1 KG/M2 | HEIGHT: 70 IN | WEIGHT: 315 LBS | SYSTOLIC BLOOD PRESSURE: 142 MMHG

## 2022-05-13 DIAGNOSIS — Z87.81 S/P ORIF (OPEN REDUCTION INTERNAL FIXATION) FRACTURE: Primary | ICD-10-CM

## 2022-05-13 DIAGNOSIS — Z98.890 S/P ORIF (OPEN REDUCTION INTERNAL FIXATION) FRACTURE: Primary | ICD-10-CM

## 2022-05-13 DIAGNOSIS — Z98.890 S/P ORIF (OPEN REDUCTION INTERNAL FIXATION) FRACTURE: ICD-10-CM

## 2022-05-13 DIAGNOSIS — Z87.81 S/P ORIF (OPEN REDUCTION INTERNAL FIXATION) FRACTURE: ICD-10-CM

## 2022-05-13 PROCEDURE — 99024 POSTOP FOLLOW-UP VISIT: CPT | Performed by: ORTHOPAEDIC SURGERY

## 2022-05-13 PROCEDURE — 73610 X-RAY EXAM OF ANKLE: CPT

## 2022-05-13 RX ORDER — OXYCODONE HYDROCHLORIDE 5 MG/1
5 TABLET ORAL EVERY 6 HOURS PRN
Qty: 20 TABLET | Refills: 0 | Status: SHIPPED | OUTPATIENT
Start: 2022-05-13

## 2022-05-13 NOTE — PROGRESS NOTES
Orthopaedics Office Visit - Post-op Patient Visit    ASSESSMENT/PLAN:    Assessment:   11 weeks status post application of external fixator left ankle performed on 02/25/2022   10 weeks status post ORIF left Pilon fracture, removal of external fixator performed on 03/03/2022      Plan:   -   Partial weight-bearing left lower extremity for 2 weeks as directed  May begin weight-bearing as tolerated after that time  -   High tide cam walker provided for patient  Maintain as directed  May transition out of cam walker in 2 weeks time  -  1 time oxycodone script provided for patient, will transition to tramadol after that  - Over the counter analgesics as needed / directed   - Ice / heat as directed   -   Follow-up 6 weeks with repeat x-ray  To Do Next Visit:  Left ankle XR     _____________________________________________________  CHIEF COMPLAINT:  Chief Complaint   Patient presents with    Left Ankle - Post-op         SUBJECTIVE:  Noble Mendez  is a 31 y o  male who presents  11 weeks status post application of external fixator left ankle performed on 02/25/2022   10 weeks status post ORIF left P line fracture, removal of external fixator performed on 03/03/2022  Patient states that his ankle is doing well overall  Patient states that he has minimal pain in his ankle currently  Patient states he has been compliant with nonweightbearing instructions of the left lower extremity  Patient states he has been maintaining his post but as directed with no complaints  Patient denies any numbness tingling in the leg  Patient offers no other complaints at this time  Patient states that his wound is healing well overall with no active drainage present         SOCIAL HISTORY:  Social History     Tobacco Use    Smoking status: Never Smoker    Smokeless tobacco: Never Used   Vaping Use    Vaping Use: Never used   Substance Use Topics    Alcohol use: No    Drug use: No       MEDICATIONS:    Current Outpatient Medications:     acetaminophen (TYLENOL) 325 mg tablet, Take 3 tablets (975 mg total) by mouth every 8 (eight) hours as needed for mild pain, Disp: , Rfl: 0    Albuterol (PROVENTIL IN), Inhale, Disp: , Rfl:     atenolol (TENORMIN) 25 mg tablet, Take 1 tablet (25 mg total) by mouth daily, Disp: 30 tablet, Rfl: 5    atorvastatin (LIPITOR) 20 mg tablet, Take 20 mg by mouth daily, Disp: , Rfl:     carvedilol (COREG) 25 mg tablet, Take 25 mg by mouth 2 (two) times a day with meals, Disp: , Rfl:     Empagliflozin-metFORMIN HCl (Synjardy) 12 5-500 MG TABS, Take by mouth 2 (two) times a day, Disp: , Rfl:     enalapril (VASOTEC) 5 mg tablet, Take 1 tablet (5 mg total) by mouth 2 (two) times a day, Disp: 30 tablet, Rfl: 5    isosorbide-hydrALAZINE (BIDIL) 20-37 5 MG per tablet, Take 2 tablets by mouth 3 (three) times a day, Disp: , Rfl:     ivabradine HCl (Corlanor) 5 MG tablet, Take 5 mg by mouth 2 (two) times a day, Disp: , Rfl:     oxyCODONE (ROXICODONE) 10 MG TABS, You may take 5 mg (0 5 tab) for moderate pain or 10 mg (1 tab) for severe pain, every 4 hours, as needed  , Disp: 21 tablet, Rfl: 0    sacubitril-valsartan (Entresto)  MG TABS, Take 1 tablet by mouth 2 (two) times a day, Disp: , Rfl:     Semaglutide (OZEMPIC, 0 25 OR 0 5 MG/DOSE, SC), Inject 0 25 mg under the skin every 7 days, Disp: , Rfl:     spironolactone (ALDACTONE) 25 mg tablet, Take 25 mg by mouth daily, Disp: , Rfl:     enoxaparin (LOVENOX) 60 mg/0 6 mL, Inject 0 6 mL (60 mg total) under the skin every 12 (twelve) hours, Disp: 50 4 mL, Rfl: 0    senna-docusate sodium (SENOKOT-S) 8 6-50 mg per tablet, Take 1 tablet by mouth 2 (two) times a day for 14 days Continue to take while taking Dilaudid , Disp: 28 tablet, Rfl: 0    REVIEW OF SYSTEMS:  MSK: left ankle pain   Neuro: WNL   Pertinent items are otherwise noted in HPI    A comprehensive review of systems was otherwise negative     _____________________________________________________  PHYSICAL EXAMINATION:  Vital signs: BP (!) 142/113   Pulse (!) 123   Ht 5' 10" (1 778 m)   Wt (!) 160 kg (352 lb)   BMI 50 51 kg/m²   General: No acute distress, awake and alert  Psychiatric: Mood and affect appear appropriate  HEENT: Trachea Midline, No torticollis, no apparent facial trauma  Cardiovascular: No audible murmurs; Extremities appear perfused  Pulmonary: No audible wheezing or stridor  Skin: No open lesions; see further details (if any) below      MUSCULOSKELETAL EXAMINATION:  Left ankle examination:  - Patient sitting comfortably in the office in no acute distress   -  Healed incision site noted over the anterior aspect of the ankle with no surrounding erythema or ecchymosis present  Extremity appears well-perfused overall   -   No bony or soft tissue tenderness palpation noted at this time   - NV intact      _____________________________________________________  STUDIES REVIEWED:  I personally reviewed the images and interpretation is as follows:  Left ankle XR 3 views:    Healing distal tibia fracture in acceptable anatomic alignment with hardware intact  PROCEDURES PERFORMED:  No procedures were performed at this time  Mary Park PA-C - assisting    Andrea Dela Cruz MD            Portions of the record may have been created with voice recognition software  Occasional wrong word or "sound a like" substitutions may have occurred due to the inherent limitations of voice recognition software  Read the chart carefully and recognize, using context, where substitutions have occurred

## 2022-05-13 NOTE — PATIENT INSTRUCTIONS
-   Partial weight-bearing left lower extremity for 2 weeks as directed  May begin weight-bearing as tolerated after that time  -   High tide cam walker provided for patient  Maintain as directed  May transition out of cam walker in 2 weeks time  -  1 time oxycodone script provided for patient  - Over the counter analgesics as needed / directed   - Ice / heat as directed   -   Follow-up 6 weeks with repeat x-ray  Color consistent with ethnicity/race, warm, dry intact, resilient.

## 2022-05-27 ENCOUNTER — TELEPHONE (OUTPATIENT)
Dept: OBGYN CLINIC | Facility: HOSPITAL | Age: 27
End: 2022-05-27

## 2022-05-27 NOTE — TELEPHONE ENCOUNTER
Dutch Mars @[employer] requesting current work status letter from 5/13/22 office visit      Fax to: [phone]

## 2022-06-03 ENCOUNTER — TELEPHONE (OUTPATIENT)
Dept: OBGYN CLINIC | Facility: HOSPITAL | Age: 27
End: 2022-06-03

## 2022-06-03 DIAGNOSIS — Z87.81 S/P ORIF (OPEN REDUCTION INTERNAL FIXATION) FRACTURE: Primary | ICD-10-CM

## 2022-06-03 DIAGNOSIS — Z98.890 S/P ORIF (OPEN REDUCTION INTERNAL FIXATION) FRACTURE: Primary | ICD-10-CM

## 2022-06-03 NOTE — TELEPHONE ENCOUNTER
Patient sees Dr Ayala Westbrook  Patients mother Justin Apodaca is calling in he had surgery on his left ankle and she stated that he reached out them and was advised they cannot schedule him due to no referral from the Dr as they are not seeing one  He has been waiting a few weeks to have this started and mom is asking if we have a phone number for this or what further we can do for this  As per last referral note he has already met the goal for home therapy and wanted to know if outpatient PT can be provided  Mom is asking that we contact the patient and let him aware once the order is placed and his next steps so he is able to begin therapy as soon as possible             Call back# [phone]

## 2022-06-07 NOTE — PLAN OF CARE
Problem: PHYSICAL THERAPY ADULT  Goal: Performs mobility at highest level of function for planned discharge setting  See evaluation for individualized goals  Description: Treatment/Interventions: Functional transfer training,LE strengthening/ROM,Therapeutic exercise,Endurance training,Patient/family training,Equipment eval/education,Bed mobility,Gait training,Spoke to nursing,OT  Equipment Re[facility]: [facility]       See flowsheet documentation for full assessment, interventions and recommendations  Outcome: Progressing  Note: Prognosis: Good  Problem List: Decreased strength,Decreased endurance,Impaired balance,Decreased mobility,Pain,Orthopedic restrictions,Obesity,Decreased skin integrity  Assessment: Pt primarily limited by pain today, requiring increased time to perform all tasks & required extended breaks between tasks such as changing LLE elevation, ambulation & bed mobilty  Pt performed all tasks in similar manner compared to previous session, but ambulatory distances remain limited by pain & related fatigue/dyspnea  Anticipate this will improve as pain resides during remainder of his stay  POC and d/c plan remain appropriate with discharge home with increased social support & home PT follow up to progress to highest level of functional independence until cleared for increased activity  Barriers to Discharge: Inaccessible home environment,Decreased caregiver support        PT Discharge Recommendation: Home with home health rehabilitation          See flowsheet documentation for full assessment  Peng Advancement Flap Text: The defect edges were debeveled with a #15 scalpel blade.  Given the location of the defect, shape of the defect and the proximity to free margins a Peng advancement flap was deemed most appropriate.  Using a sterile surgical marker, an appropriate advancement flap was drawn incorporating the defect and placing the expected incisions within the relaxed skin tension lines where possible. The area thus outlined was incised deep to adipose tissue with a #15 scalpel blade.  The skin margins were undermined to an appropriate distance in all directions utilizing iris scissors.

## 2022-06-13 ENCOUNTER — EVALUATION (OUTPATIENT)
Dept: PHYSICAL THERAPY | Facility: CLINIC | Age: 27
End: 2022-06-13
Payer: OTHER MISCELLANEOUS

## 2022-06-13 DIAGNOSIS — Z98.890 S/P ORIF (OPEN REDUCTION INTERNAL FIXATION) FRACTURE: Primary | ICD-10-CM

## 2022-06-13 DIAGNOSIS — Z87.81 S/P ORIF (OPEN REDUCTION INTERNAL FIXATION) FRACTURE: Primary | ICD-10-CM

## 2022-06-13 PROCEDURE — 97162 PT EVAL MOD COMPLEX 30 MIN: CPT

## 2022-06-13 PROCEDURE — 97110 THERAPEUTIC EXERCISES: CPT
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
## 2022-06-15 ENCOUNTER — TELEPHONE (OUTPATIENT)
Dept: OBGYN CLINIC | Facility: HOSPITAL | Age: 27
End: 2022-06-15

## 2022-06-15 NOTE — TELEPHONE ENCOUNTER
Patient sees Dr Elis Orozco is calling in from [address] [place] [address] wanting to know if we received the orders that were faxed over to us today on 6/15 relating to home health resumption of care orders? She is asking if this was received as they are trying to get this back as soon as possible        Call back if needed#[phone]

## 2022-06-18 DIAGNOSIS — Z98.890 S/P ORIF (OPEN REDUCTION INTERNAL FIXATION) FRACTURE: Primary | ICD-10-CM

## 2022-06-18 DIAGNOSIS — Z87.81 S/P ORIF (OPEN REDUCTION INTERNAL FIXATION) FRACTURE: Primary | ICD-10-CM

## 2022-06-20 ENCOUNTER — OFFICE VISIT (OUTPATIENT)
Dept: PHYSICAL THERAPY | Facility: CLINIC | Age: 27
End: 2022-06-20
Payer: OTHER MISCELLANEOUS

## 2022-06-20 DIAGNOSIS — Z87.81 S/P ORIF (OPEN REDUCTION INTERNAL FIXATION) FRACTURE: Primary | ICD-10-CM

## 2022-06-20 DIAGNOSIS — Z98.890 S/P ORIF (OPEN REDUCTION INTERNAL FIXATION) FRACTURE: Primary | ICD-10-CM

## 2022-06-20 PROCEDURE — 97112 NEUROMUSCULAR REEDUCATION: CPT

## 2022-06-20 PROCEDURE — 97140 MANUAL THERAPY 1/> REGIONS: CPT

## 2022-06-20 PROCEDURE — 97530 THERAPEUTIC ACTIVITIES: CPT

## 2022-06-20 PROCEDURE — 97110 THERAPEUTIC EXERCISES: CPT
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
## 2022-06-24 ENCOUNTER — HOSPITAL ENCOUNTER (OUTPATIENT)
Dept: RADIOLOGY | Facility: HOSPITAL | Age: 27
Discharge: HOME | End: 2022-06-24
Attending: ORTHOPAEDIC SURGERY
Payer: MEDICARE

## 2022-06-24 ENCOUNTER — OFFICE VISIT (OUTPATIENT)
Dept: PHYSICAL THERAPY | Facility: CLINIC | Age: 27
End: 2022-06-24
Payer: OTHER MISCELLANEOUS

## 2022-06-24 ENCOUNTER — OFFICE VISIT (OUTPATIENT)
Dept: OBGYN CLINIC | Facility: HOSPITAL | Age: 27
End: 2022-06-24
Payer: OTHER MISCELLANEOUS

## 2022-06-24 VITALS
WEIGHT: 315 LBS | HEIGHT: 70 IN | HEART RATE: 99 BPM | SYSTOLIC BLOOD PRESSURE: 137 MMHG | BODY MASS INDEX: 45.1 KG/M2 | DIASTOLIC BLOOD PRESSURE: 87 MMHG

## 2022-06-24 DIAGNOSIS — Z87.81 S/P ORIF (OPEN REDUCTION INTERNAL FIXATION) FRACTURE: Primary | ICD-10-CM

## 2022-06-24 DIAGNOSIS — Z98.890 S/P ORIF (OPEN REDUCTION INTERNAL FIXATION) FRACTURE: Primary | ICD-10-CM

## 2022-06-24 DIAGNOSIS — S82.892A CLOSED FRACTURE OF LEFT ANKLE, INITIAL ENCOUNTER: ICD-10-CM

## 2022-06-24 DIAGNOSIS — S82.872A CLOSED DISPLACED PILON FRACTURE OF LEFT TIBIA, INITIAL ENCOUNTER: ICD-10-CM

## 2022-06-24 DIAGNOSIS — Z87.81 S/P ORIF (OPEN REDUCTION INTERNAL FIXATION) FRACTURE: ICD-10-CM

## 2022-06-24 DIAGNOSIS — Z98.890 S/P ORIF (OPEN REDUCTION INTERNAL FIXATION) FRACTURE: ICD-10-CM

## 2022-06-24 PROCEDURE — 97140 MANUAL THERAPY 1/> REGIONS: CPT

## 2022-06-24 PROCEDURE — 99213 OFFICE O/P EST LOW 20 MIN: CPT | Performed by: ORTHOPAEDIC SURGERY

## 2022-06-24 PROCEDURE — 97110 THERAPEUTIC EXERCISES: CPT

## 2022-06-24 PROCEDURE — 97530 THERAPEUTIC ACTIVITIES: CPT

## 2022-06-24 PROCEDURE — 73610 X-RAY EXAM OF ANKLE: CPT

## 2022-06-24 PROCEDURE — 97112 NEUROMUSCULAR REEDUCATION: CPT

## 2022-06-24 NOTE — PROGRESS NOTES
Orthopaedics Office Visit - Follow up Patient Visit    ASSESSMENT/PLAN:    Assessment:   17 weeks status post application of external fixator left ankle performed on 02/25/2022   16 weeks status post ORIF left Pilon fracture, removal of external fixator performed on 03/03/2022    Plan:   · X-rays were performed in the office and reviewed   · Lace up ankle brace was fit in the office today, to be worn with a normal supportive sneaker   · May continue the use of the cam walker boot for longer distances, this is not needed at this time and will cause stiffness  · Advised to continue formal therapy for ROM, stretching, strengthening and proprioceptic exercises, start up pain and stifness is normal at this point    · Unable to return to work at this time, note was provided   · Follow up in 4 weeks time for re-evaluation and repeat left ankle x-rays     To Do Next Visit:  Re-evaluation, re-discuss return to work, x-ray left ankle     _____________________________________________________  CHIEF COMPLAINT:  Chief Complaint   Patient presents with    Left Ankle - Follow-up         SUBJECTIVE:  Jessy Muro  is a 31 y o  male who presents for follow up s/p 17 weeks status post application of external fixator left ankle performed on 02/25/2022 and 16 weeks status post ORIF left Pilon fracture, removal of external fixator performed on 03/03/2022 started PT last week  He notes start up pain as well as stiffness  Pain is to the lateral aspect  He is currently out of work, 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       PAST MEDICAL HISTORY:  Past Medical History:   Diagnosis Date    Enchondroma of hand bone     last assessed: 4/21/2015    Heart trouble     Hypertension     Palpitations        PAST SURGICAL HISTORY:  Past Surgical History:   Procedure Laterality Date    EXTERNAL FIXATOR APPLICATION Left 3/21/0107    Procedure: APPLICATION EXTERNAL FIXATION DEVICE LOWER EXTREMITY;  Surgeon: Meron Aguiar MD; Location: [facility];  Service: Orthopedics    NO PAST SURGERIES      ORIF TIBIA FRACTURE Left 3/3/2022    Procedure: OPEN REDUCTION W/ INTERNAL FIXATION (ORIF) LEFT TIBIA PILON FRACTURE, REMOVAL OF EXTERNAL FIXATOR;  Surgeon: Shawna Hoyos MD;  Location: [facility];  Service: Orthopedics       FAMILY HISTORY:  Family History   Problem Relation Age of Onset    No Known Problems Mother     No Known Problems Father     Autism Brother         autistic diosrder    Diabetes type II Paternal Grandmother         mellitus    Autism Brother         autistic diosrder    Depression Other        SOCIAL HISTORY:  Social History     Tobacco Use    Smoking status: Never Smoker    Smokeless tobacco: Never Used   Vaping Use    Vaping Use: Never used   Substance Use Topics    Alcohol use: No    Drug use: No       MEDICATIONS:    Current Outpatient Medications:     acetaminophen (TYLENOL) 325 mg tablet, Take 3 tablets (975 mg total) by mouth every 8 (eight) hours as needed for mild pain, Disp: , Rfl: 0    Albuterol (PROVENTIL IN), Inhale, Disp: , Rfl:     atenolol (TENORMIN) 25 mg tablet, Take 1 tablet (25 mg total) by mouth daily, Disp: 30 tablet, Rfl: 5    atorvastatin (LIPITOR) 20 mg tablet, Take 20 mg by mouth daily, Disp: , Rfl:     carvedilol (COREG) 25 mg tablet, Take 25 mg by mouth 2 (two) times a day with meals, Disp: , Rfl:     Empagliflozin-metFORMIN HCl (Synjardy) 12 5-500 MG TABS, Take by mouth 2 (two) times a day, Disp: , Rfl:     enalapril (VASOTEC) 5 mg tablet, Take 1 tablet (5 mg total) by mouth 2 (two) times a day, Disp: 30 tablet, Rfl: 5    isosorbide-hydrALAZINE (BIDIL) 20-37 5 MG per tablet, Take 2 tablets by mouth 3 (three) times a day, Disp: , Rfl:     ivabradine HCl (Corlanor) 5 MG tablet, Take 5 mg by mouth 2 (two) times a day, Disp: , Rfl:     oxyCODONE (Roxicodone) 5 immediate release tablet, Take 1 tablet (5 mg total) by mouth every 6 (six) hours as needed for moderate pain Max Daily Amount: 20 mg, Disp: 20 tablet, Rfl: 0    sacubitril-valsartan (Entresto)  MG TABS, Take 1 tablet by mouth 2 (two) times a day, Disp: , Rfl:     Semaglutide (OZEMPIC, 0 25 OR 0 5 MG/DOSE, SC), Inject 0 25 mg under the skin every 7 days, Disp: , Rfl:     spironolactone (ALDACTONE) 25 mg tablet, Take 25 mg by mouth daily, Disp: , Rfl:     enoxaparin (LOVENOX) 60 mg/0 6 mL, Inject 0 6 mL (60 mg total) under the skin every 12 (twelve) hours, Disp: 50 4 mL, Rfl: 0    oxyCODONE (ROXICODONE) 10 MG TABS, You may take 5 mg (0 5 tab) for moderate pain or 10 mg (1 tab) for severe pain, every 4 hours, as needed  (Patient not taking: Reported on 6/24/2022), Disp: 21 tablet, Rfl: 0    senna-docusate sodium (SENOKOT-S) 8 6-50 mg per tablet, Take 1 tablet by mouth 2 (two) times a day for 14 days Continue to take while taking Dilaudid , Disp: 28 tablet, Rfl: 0    ALLERGIES:  Allergies   Allergen Reactions    Banana - Food Allergy Other (See Comments)          Bee Venom Other (See Comments)          Pollen Extract        REVIEW OF SYSTEMS:  MSK: as noted in HPI   Neuro: WNL's  Pertinent items are otherwise noted in HPI  A comprehensive review of systems was otherwise negative  LABS:  HgA1c:   Lab Results   Component Value Date    HGBA1C 6 6 (H) 04/09/2022     BMP:   Lab Results   Component Value Date    GLUCOSE 204 (H) 03/03/2022    CALCIUM 9 2 03/04/2022    K 4 4 03/04/2022    CO2 25 03/04/2022     03/04/2022    BUN 16 03/04/2022    CREATININE 1 13 03/04/2022     CBC: No components found for: CBC    _____________________________________________________  PHYSICAL EXAMINATION:  Vital signs: /87   Pulse 99   Ht 5' 10" (1 778 m)   Wt (!) 148 kg (326 lb)   BMI 46 78 kg/m²   General: No acute distress, awake and alert  Psychiatric: Mood and affect appear appropriate  HEENT: Trachea Midline, No torticollis, no apparent facial trauma  Cardiovascular: No audible murmurs;  Extremities appear perfused  Pulmonary: No audible wheezing or stridor  Skin: No open lesions; see further details (if any) below    MUSCULOSKELETAL EXAMINATION:    Extremities:  LLE:   No erythema, ecchymosis or significant edema  Well healed surgical incision   Non tender to palpation   Limited ankle ROM, plantar flexion, dorsi flexion, inversion and eversion  Extremity appears warm and well perfused       _____________________________________________________  STUDIES REVIEWED:  I personally reviewed the images and interpretation is as follows:  X-ray left ankle demonstrates orthopedic hardware in good alignment and position without signs of failure or loosening  Healed distal fibula fracture          PROCEDURES PERFORMED:  Procedures    Scribe Attestation    I,:  Tracey Yuen am acting as a scribe while in the presence of the attending physician :       I,:  Flavia Scott MD personally performed the services described in this documentation    as scribed in my presence :
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
## 2022-06-24 NOTE — LETTER
June 24, 2022     Patient: Emory Ramírez  YOB: 1995  Date of Visit: 6/24/2022      To Whom it May Concern:    Hyacinth Plunkett is under my professional care  Alanna Carter was seen in my office on 6/24/2022  Alanna Carter is not yet cleared to return to work  He will be re-evaluated in 4 weeks time  If you have any questions or concerns, please don't hesitate to call           Sincerely,          Desire Altamirano MD

## 2022-06-27 ENCOUNTER — OFFICE VISIT (OUTPATIENT)
Dept: PHYSICAL THERAPY | Facility: CLINIC | Age: 27
End: 2022-06-27
Payer: OTHER MISCELLANEOUS

## 2022-06-27 ENCOUNTER — TELEPHONE (OUTPATIENT)
Dept: OBGYN CLINIC | Facility: HOSPITAL | Age: 27
End: 2022-06-27

## 2022-06-27 DIAGNOSIS — Z98.890 S/P ORIF (OPEN REDUCTION INTERNAL FIXATION) FRACTURE: Primary | ICD-10-CM

## 2022-06-27 DIAGNOSIS — Z87.81 S/P ORIF (OPEN REDUCTION INTERNAL FIXATION) FRACTURE: Primary | ICD-10-CM

## 2022-06-27 PROCEDURE — 97530 THERAPEUTIC ACTIVITIES: CPT

## 2022-06-27 PROCEDURE — 97110 THERAPEUTIC EXERCISES: CPT

## 2022-06-27 PROCEDURE — 97140 MANUAL THERAPY 1/> REGIONS: CPT

## 2022-06-27 PROCEDURE — 97112 NEUROMUSCULAR REEDUCATION: CPT

## 2022-06-27 NOTE — TELEPHONE ENCOUNTER
[facility] called for the 06/24/2022 OVN and work status to be faxed to them at [phone], ref claim [id number]    Faxed, as requested
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
## 2022-06-29 ENCOUNTER — APPOINTMENT (OUTPATIENT)
Dept: PHYSICAL THERAPY | Facility: CLINIC | Age: 27
End: 2022-06-29
Payer: OTHER MISCELLANEOUS

## 2022-07-06 ENCOUNTER — OFFICE VISIT (OUTPATIENT)
Dept: PHYSICAL THERAPY | Facility: CLINIC | Age: 27
End: 2022-07-06
Payer: OTHER MISCELLANEOUS

## 2022-07-06 DIAGNOSIS — Z87.81 S/P ORIF (OPEN REDUCTION INTERNAL FIXATION) FRACTURE: Primary | ICD-10-CM

## 2022-07-06 DIAGNOSIS — Z98.890 S/P ORIF (OPEN REDUCTION INTERNAL FIXATION) FRACTURE: Primary | ICD-10-CM

## 2022-07-06 PROCEDURE — 97140 MANUAL THERAPY 1/> REGIONS: CPT

## 2022-07-06 PROCEDURE — 97530 THERAPEUTIC ACTIVITIES: CPT

## 2022-07-06 PROCEDURE — 97110 THERAPEUTIC EXERCISES: CPT

## 2022-07-06 PROCEDURE — 97112 NEUROMUSCULAR REEDUCATION: CPT
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
## 2022-07-11 ENCOUNTER — OFFICE VISIT (OUTPATIENT)
Dept: PHYSICAL THERAPY | Facility: CLINIC | Age: 27
End: 2022-07-11
Payer: OTHER MISCELLANEOUS

## 2022-07-11 DIAGNOSIS — Z98.890 S/P ORIF (OPEN REDUCTION INTERNAL FIXATION) FRACTURE: Primary | ICD-10-CM

## 2022-07-11 DIAGNOSIS — Z87.81 S/P ORIF (OPEN REDUCTION INTERNAL FIXATION) FRACTURE: Primary | ICD-10-CM

## 2022-07-11 PROCEDURE — 97530 THERAPEUTIC ACTIVITIES: CPT

## 2022-07-11 PROCEDURE — 97140 MANUAL THERAPY 1/> REGIONS: CPT

## 2022-07-11 PROCEDURE — 97110 THERAPEUTIC EXERCISES: CPT

## 2022-07-11 PROCEDURE — 97112 NEUROMUSCULAR REEDUCATION: CPT
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
## 2022-07-14 ENCOUNTER — APPOINTMENT (OUTPATIENT)
Dept: PHYSICAL THERAPY | Facility: CLINIC | Age: 27
End: 2022-07-14
Payer: OTHER MISCELLANEOUS

## 2022-07-15 ENCOUNTER — APPOINTMENT (OUTPATIENT)
Dept: PHYSICAL THERAPY | Facility: CLINIC | Age: 27
End: 2022-07-15
Payer: OTHER MISCELLANEOUS

## 2022-07-15 ENCOUNTER — OFFICE VISIT (OUTPATIENT)
Dept: PHYSICAL THERAPY | Facility: CLINIC | Age: 27
End: 2022-07-15
Payer: OTHER MISCELLANEOUS

## 2022-07-15 DIAGNOSIS — Z87.81 S/P ORIF (OPEN REDUCTION INTERNAL FIXATION) FRACTURE: Primary | ICD-10-CM

## 2022-07-15 DIAGNOSIS — Z98.890 S/P ORIF (OPEN REDUCTION INTERNAL FIXATION) FRACTURE: Primary | ICD-10-CM

## 2022-07-15 PROCEDURE — 97112 NEUROMUSCULAR REEDUCATION: CPT

## 2022-07-15 PROCEDURE — 97530 THERAPEUTIC ACTIVITIES: CPT

## 2022-07-15 PROCEDURE — 97110 THERAPEUTIC EXERCISES: CPT

## 2022-07-15 PROCEDURE — 97140 MANUAL THERAPY 1/> REGIONS: CPT
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
## 2022-07-18 ENCOUNTER — OFFICE VISIT (OUTPATIENT)
Dept: PHYSICAL THERAPY | Facility: CLINIC | Age: 27
End: 2022-07-18
Payer: OTHER MISCELLANEOUS

## 2022-07-18 DIAGNOSIS — Z98.890 S/P ORIF (OPEN REDUCTION INTERNAL FIXATION) FRACTURE: Primary | ICD-10-CM

## 2022-07-18 DIAGNOSIS — Z87.81 S/P ORIF (OPEN REDUCTION INTERNAL FIXATION) FRACTURE: Primary | ICD-10-CM

## 2022-07-18 PROCEDURE — 97140 MANUAL THERAPY 1/> REGIONS: CPT

## 2022-07-18 PROCEDURE — 97530 THERAPEUTIC ACTIVITIES: CPT

## 2022-07-18 PROCEDURE — 97112 NEUROMUSCULAR REEDUCATION: CPT

## 2022-07-18 PROCEDURE — 97110 THERAPEUTIC EXERCISES: CPT
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
## 2022-07-22 ENCOUNTER — OFFICE VISIT (OUTPATIENT)
Dept: PHYSICAL THERAPY | Facility: CLINIC | Age: 27
End: 2022-07-22
Payer: OTHER MISCELLANEOUS

## 2022-07-22 DIAGNOSIS — Z98.890 S/P ORIF (OPEN REDUCTION INTERNAL FIXATION) FRACTURE: Primary | ICD-10-CM

## 2022-07-22 DIAGNOSIS — Z87.81 S/P ORIF (OPEN REDUCTION INTERNAL FIXATION) FRACTURE: Primary | ICD-10-CM

## 2022-07-22 PROCEDURE — 97112 NEUROMUSCULAR REEDUCATION: CPT

## 2022-07-22 PROCEDURE — 97110 THERAPEUTIC EXERCISES: CPT

## 2022-07-22 PROCEDURE — 97140 MANUAL THERAPY 1/> REGIONS: CPT
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
## 2022-07-23 DIAGNOSIS — Z87.81 S/P ORIF (OPEN REDUCTION INTERNAL FIXATION) FRACTURE: Primary | ICD-10-CM

## 2022-07-23 DIAGNOSIS — Z98.890 S/P ORIF (OPEN REDUCTION INTERNAL FIXATION) FRACTURE: Primary | ICD-10-CM

## 2022-07-25 ENCOUNTER — EVALUATION (OUTPATIENT)
Dept: PHYSICAL THERAPY | Facility: CLINIC | Age: 27
End: 2022-07-25
Payer: OTHER MISCELLANEOUS

## 2022-07-25 DIAGNOSIS — Z87.81 S/P ORIF (OPEN REDUCTION INTERNAL FIXATION) FRACTURE: Primary | ICD-10-CM

## 2022-07-25 DIAGNOSIS — Z98.890 S/P ORIF (OPEN REDUCTION INTERNAL FIXATION) FRACTURE: Primary | ICD-10-CM

## 2022-07-25 PROCEDURE — 97164 PT RE-EVAL EST PLAN CARE: CPT

## 2022-07-25 PROCEDURE — 97140 MANUAL THERAPY 1/> REGIONS: CPT

## 2022-07-25 PROCEDURE — 97110 THERAPEUTIC EXERCISES: CPT

## 2022-07-25 PROCEDURE — 97112 NEUROMUSCULAR REEDUCATION: CPT
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
## 2022-07-29 ENCOUNTER — OFFICE VISIT (OUTPATIENT)
Dept: OBGYN CLINIC | Facility: HOSPITAL | Age: 27
End: 2022-07-29
Payer: OTHER MISCELLANEOUS

## 2022-07-29 ENCOUNTER — HOSPITAL ENCOUNTER (OUTPATIENT)
Dept: RADIOLOGY | Facility: HOSPITAL | Age: 27
Discharge: HOME | End: 2022-07-29
Attending: ORTHOPAEDIC SURGERY
Payer: MEDICARE

## 2022-07-29 VITALS
SYSTOLIC BLOOD PRESSURE: 131 MMHG | HEIGHT: 70 IN | DIASTOLIC BLOOD PRESSURE: 89 MMHG | BODY MASS INDEX: 45.1 KG/M2 | WEIGHT: 315 LBS | HEART RATE: 101 BPM

## 2022-07-29 DIAGNOSIS — Z87.81 S/P ORIF (OPEN REDUCTION INTERNAL FIXATION) FRACTURE: ICD-10-CM

## 2022-07-29 DIAGNOSIS — S82.872D CLOSED DISPLACED PILON FRACTURE OF LEFT TIBIA WITH ROUTINE HEALING, SUBSEQUENT ENCOUNTER: Primary | ICD-10-CM

## 2022-07-29 DIAGNOSIS — Z98.890 S/P ORIF (OPEN REDUCTION INTERNAL FIXATION) FRACTURE: ICD-10-CM

## 2022-07-29 PROCEDURE — 99213 OFFICE O/P EST LOW 20 MIN: CPT | Performed by: ORTHOPAEDIC SURGERY

## 2022-07-29 PROCEDURE — 73610 X-RAY EXAM OF ANKLE: CPT

## 2022-07-29 NOTE — PROGRESS NOTES
Orthopaedics Office Visit - Follow up Patient Visit    ASSESSMENT/PLAN:    Assessment:   Just under 5 months sp ORIF L tibia pilon fx with removal of external fixator    Plan:   -continue physical therapy with focus on strengthening range of motion of the ankle  -continue to weightbear as tolerated and progress himself out of the boot with the assistance of physical therapy  -may progress to working activities as tolerated and as he progresses with physical therapy  -use oral analgesics over-the-counter as needed  -follow-up in 3 months time for repeat clinical evaluation and repeat x-ray    To Do Next Visit:  Repeat x-ray left ankle    _____________________________________________________  CHIEF COMPLAINT:  Chief Complaint   Patient presents with    Left Ankle - Follow-up         SUBJECTIVE:  Kylie Cee  is a 31 y o  male who presents for follow up evaluation of the left ankle  He is 5 months 4 days status post application of external fixator left tibia pilon fracture performed on 02/25/2022 and 4 months 26 days status post ORIF left Pilon fracture, removal of external fixator performed on 03/03/2022  He states that he is doing well overall and has been able to ambulate with the assistance of his tall Cam walker boot  He has been participating in physical therapy and states he does appreciate incremental improvements overall  He states that he does experience soreness of the ankle after physical therapy  He notes that there was a recent increase in intensity of physical therapy which did exacerbate some pain into his ankle  He notes that he did utilize oxycodone to help with his pain  He denies any recurrence of swelling  He also denies any signs of infection his redness or heat  He denies any distal paresthesias    He has been out of work up to this point    [address]:  Past Medical History:   Diagnosis Date    Enchondroma of hand bone     last assessed: 4/21/2015    Heart trouble  Hypertension     Palpitations        PAST SURGICAL HISTORY:  Past Surgical History:   Procedure Laterality Date    EXTERNAL FIXATOR APPLICATION Left 7/44/6770    Procedure: APPLICATION EXTERNAL FIXATION DEVICE LOWER EXTREMITY;  Surgeon: Gaurav Hobson MD;  Location: [facility];  Service: Orthopedics    NO PAST SURGERIES      ORIF TIBIA FRACTURE Left 3/3/2022    Procedure: OPEN REDUCTION W/ INTERNAL FIXATION (ORIF) LEFT TIBIA PILON FRACTURE, REMOVAL OF EXTERNAL FIXATOR;  Surgeon: Gaurav Hobson MD;  Location: [facility];  Service: Orthopedics       FAMILY HISTORY:  Family History   Problem Relation Age of Onset    No Known Problems Mother     No Known Problems Father     Autism Brother         autistic diosrder    Diabetes type II Paternal Grandmother         mellitus    Autism Brother         autistic diosrder    Depression Other        SOCIAL HISTORY:  Social History     Tobacco Use    Smoking status: Never Smoker    Smokeless tobacco: Never Used   Vaping Use    Vaping Use: Never used   Substance Use Topics    Alcohol use: No    Drug use: No       MEDICATIONS:    Current Outpatient Medications:     acetaminophen (TYLENOL) 325 mg tablet, Take 3 tablets (975 mg total) by mouth every 8 (eight) hours as needed for mild pain, Disp: , Rfl: 0    Albuterol (PROVENTIL IN), Inhale, Disp: , Rfl:     atenolol (TENORMIN) 25 mg tablet, Take 1 tablet (25 mg total) by mouth daily, Disp: 30 tablet, Rfl: 5    atorvastatin (LIPITOR) 20 mg tablet, Take 20 mg by mouth daily, Disp: , Rfl:     carvedilol (COREG) 25 mg tablet, Take 25 mg by mouth 2 (two) times a day with meals, Disp: , Rfl:     Empagliflozin-metFORMIN HCl (Synjardy) 12 5-500 MG TABS, Take by mouth 2 (two) times a day, Disp: , Rfl:     enalapril (VASOTEC) 5 mg tablet, Take 1 tablet (5 mg total) by mouth 2 (two) times a day, Disp: 30 tablet, Rfl: 5    isosorbide-hydrALAZINE (BIDIL) 20-37 5 MG per tablet, Take 2 tablets by mouth 3 (three) times a day, Disp: , Rfl:     ivabradine HCl (Corlanor) 5 MG tablet, Take 5 mg by mouth 2 (two) times a day, Disp: , Rfl:     oxyCODONE (ROXICODONE) 10 MG TABS, You may take 5 mg (0 5 tab) for moderate pain or 10 mg (1 tab) for severe pain, every 4 hours, as needed  , Disp: 21 tablet, Rfl: 0    oxyCODONE (Roxicodone) 5 immediate release tablet, Take 1 tablet (5 mg total) by mouth every 6 (six) hours as needed for moderate pain Max Daily Amount: 20 mg, Disp: 20 tablet, Rfl: 0    sacubitril-valsartan (Entresto)  MG TABS, Take 1 tablet by mouth 2 (two) times a day, Disp: , Rfl:     Semaglutide (OZEMPIC, 0 25 OR 0 5 MG/DOSE, SC), Inject 0 25 mg under the skin every 7 days, Disp: , Rfl:     spironolactone (ALDACTONE) 25 mg tablet, Take 25 mg by mouth daily, Disp: , Rfl:     enoxaparin (LOVENOX) 60 mg/0 6 mL, Inject 0 6 mL (60 mg total) under the skin every 12 (twelve) hours, Disp: 50 4 mL, Rfl: 0    senna-docusate sodium (SENOKOT-S) 8 6-50 mg per tablet, Take 1 tablet by mouth 2 (two) times a day for 14 days Continue to take while taking Dilaudid , Disp: 28 tablet, Rfl: 0    ALLERGIES:  Allergies   Allergen Reactions    Banana - Food Allergy Other (See Comments)          Bee Venom Other (See Comments)          Pollen Extract        REVIEW OF SYSTEMS:  MSK:  Left ankle  Neuro:  Within normal limits  Pertinent items are otherwise noted in HPI  A comprehensive review of systems was otherwise negative      LABS:  HgA1c:   Lab Results   Component Value Date    HGBA1C 6 6 (H) 04/09/2022     BMP:   Lab Results   Component Value Date    GLUCOSE 204 (H) 03/03/2022    CALCIUM 9 2 03/04/2022    K 4 4 03/04/2022    CO2 25 03/04/2022     03/04/2022    BUN 16 03/04/2022    CREATININE 1 13 03/04/2022     CBC: No components found for: CBC    _____________________________________________________  PHYSICAL EXAMINATION:  Vital signs: /89   Pulse 101   Ht 5' 10" (1 778 m)   Wt (!) 148 kg (326 lb)   BMI 46 78 kg/m²   General: No acute distress, awake and alert  Psychiatric: Mood and affect appear appropriate  HEENT: Trachea Midline, No torticollis, no apparent facial trauma  Cardiovascular: No audible murmurs;  Extremities appear perfused  Pulmonary: No audible wheezing or stridor  Skin: No open lesions; see further details (if any) below    MUSCULOSKELETAL EXAMINATION:  Left ankle exam  -mild diffuse swelling of the lower leg, ankle and foot  -surgical incisions well healed  -sensations intact  -2+ dorsal pedal pulse  -range of motion limited secondary to stiffness and weakness  -dorsiflexion: 4+/5  -plantar flexion: 5/5    _____________________________________________________  STUDIES REVIEWED:  I personally reviewed the images and interpretation is as follows:    X-rays of the left ankle obtained today 7/29/2022 demonstrates a healing and stable ankle ORIF with no evidence of surgical hardware failure    PROCEDURES PERFORMED:  No procedures performed today    Scribe Attestation    I,:  Ileana Mosqueda am acting as a scribe while in the presence of the attending physician :       I,:  Duncan Llamas MD personally performed the services described in this documentation    as scribed in my presence :

## 2022-08-01 ENCOUNTER — OFFICE VISIT (OUTPATIENT)
Dept: PHYSICAL THERAPY | Facility: CLINIC | Age: 27
End: 2022-08-01
Payer: OTHER MISCELLANEOUS

## 2022-08-01 DIAGNOSIS — Z98.890 S/P ORIF (OPEN REDUCTION INTERNAL FIXATION) FRACTURE: Primary | ICD-10-CM

## 2022-08-01 DIAGNOSIS — Z87.81 S/P ORIF (OPEN REDUCTION INTERNAL FIXATION) FRACTURE: Primary | ICD-10-CM

## 2022-08-01 PROCEDURE — 97110 THERAPEUTIC EXERCISES: CPT

## 2022-08-01 PROCEDURE — 97112 NEUROMUSCULAR REEDUCATION: CPT

## 2022-08-01 PROCEDURE — 97140 MANUAL THERAPY 1/> REGIONS: CPT
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
## 2022-08-05 ENCOUNTER — OFFICE VISIT (OUTPATIENT)
Dept: PHYSICAL THERAPY | Facility: CLINIC | Age: 27
End: 2022-08-05
Payer: OTHER MISCELLANEOUS

## 2022-08-05 DIAGNOSIS — Z98.890 S/P ORIF (OPEN REDUCTION INTERNAL FIXATION) FRACTURE: Primary | ICD-10-CM

## 2022-08-05 DIAGNOSIS — Z87.81 S/P ORIF (OPEN REDUCTION INTERNAL FIXATION) FRACTURE: Primary | ICD-10-CM

## 2022-08-05 PROCEDURE — 97530 THERAPEUTIC ACTIVITIES: CPT

## 2022-08-05 PROCEDURE — 97140 MANUAL THERAPY 1/> REGIONS: CPT

## 2022-08-05 PROCEDURE — 97110 THERAPEUTIC EXERCISES: CPT

## 2022-08-05 PROCEDURE — 97112 NEUROMUSCULAR REEDUCATION: CPT
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
## 2022-08-08 ENCOUNTER — OFFICE VISIT (OUTPATIENT)
Dept: PHYSICAL THERAPY | Facility: CLINIC | Age: 27
End: 2022-08-08
Payer: OTHER MISCELLANEOUS

## 2022-08-08 DIAGNOSIS — Z98.890 S/P ORIF (OPEN REDUCTION INTERNAL FIXATION) FRACTURE: Primary | ICD-10-CM

## 2022-08-08 DIAGNOSIS — Z87.81 S/P ORIF (OPEN REDUCTION INTERNAL FIXATION) FRACTURE: Primary | ICD-10-CM

## 2022-08-08 PROCEDURE — 97112 NEUROMUSCULAR REEDUCATION: CPT

## 2022-08-08 PROCEDURE — 97110 THERAPEUTIC EXERCISES: CPT

## 2022-08-08 PROCEDURE — 97140 MANUAL THERAPY 1/> REGIONS: CPT
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
## 2022-08-12 ENCOUNTER — OFFICE VISIT (OUTPATIENT)
Dept: PHYSICAL THERAPY | Facility: CLINIC | Age: 27
End: 2022-08-12
Payer: OTHER MISCELLANEOUS

## 2022-08-12 DIAGNOSIS — Z98.890 S/P ORIF (OPEN REDUCTION INTERNAL FIXATION) FRACTURE: Primary | ICD-10-CM

## 2022-08-12 DIAGNOSIS — Z87.81 S/P ORIF (OPEN REDUCTION INTERNAL FIXATION) FRACTURE: Primary | ICD-10-CM

## 2022-08-12 PROCEDURE — 97112 NEUROMUSCULAR REEDUCATION: CPT

## 2022-08-12 PROCEDURE — 97140 MANUAL THERAPY 1/> REGIONS: CPT

## 2022-08-12 PROCEDURE — 97110 THERAPEUTIC EXERCISES: CPT
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
## 2022-08-15 ENCOUNTER — OFFICE VISIT (OUTPATIENT)
Dept: PHYSICAL THERAPY | Facility: CLINIC | Age: 27
End: 2022-08-15
Payer: OTHER MISCELLANEOUS

## 2022-08-15 DIAGNOSIS — Z98.890 S/P ORIF (OPEN REDUCTION INTERNAL FIXATION) FRACTURE: Primary | ICD-10-CM

## 2022-08-15 DIAGNOSIS — Z87.81 S/P ORIF (OPEN REDUCTION INTERNAL FIXATION) FRACTURE: Primary | ICD-10-CM

## 2022-08-15 PROCEDURE — 97110 THERAPEUTIC EXERCISES: CPT

## 2022-08-15 PROCEDURE — 97112 NEUROMUSCULAR REEDUCATION: CPT

## 2022-08-15 PROCEDURE — 97140 MANUAL THERAPY 1/> REGIONS: CPT

## 2022-08-15 PROCEDURE — 97530 THERAPEUTIC ACTIVITIES: CPT
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
## 2022-08-19 ENCOUNTER — OFFICE VISIT (OUTPATIENT)
Dept: PHYSICAL THERAPY | Facility: CLINIC | Age: 27
End: 2022-08-19
Payer: OTHER MISCELLANEOUS

## 2022-08-19 DIAGNOSIS — Z87.81 S/P ORIF (OPEN REDUCTION INTERNAL FIXATION) FRACTURE: Primary | ICD-10-CM

## 2022-08-19 DIAGNOSIS — Z98.890 S/P ORIF (OPEN REDUCTION INTERNAL FIXATION) FRACTURE: Primary | ICD-10-CM

## 2022-08-19 PROCEDURE — 97140 MANUAL THERAPY 1/> REGIONS: CPT

## 2022-08-19 PROCEDURE — 97110 THERAPEUTIC EXERCISES: CPT

## 2022-08-19 PROCEDURE — 97530 THERAPEUTIC ACTIVITIES: CPT

## 2022-08-19 PROCEDURE — 97112 NEUROMUSCULAR REEDUCATION: CPT
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
## 2022-08-22 ENCOUNTER — OFFICE VISIT (OUTPATIENT)
Dept: PHYSICAL THERAPY | Facility: CLINIC | Age: 27
End: 2022-08-22
Payer: OTHER MISCELLANEOUS

## 2022-08-22 DIAGNOSIS — Z98.890 S/P ORIF (OPEN REDUCTION INTERNAL FIXATION) FRACTURE: Primary | ICD-10-CM

## 2022-08-22 DIAGNOSIS — Z87.81 S/P ORIF (OPEN REDUCTION INTERNAL FIXATION) FRACTURE: Primary | ICD-10-CM

## 2022-08-22 PROCEDURE — 97140 MANUAL THERAPY 1/> REGIONS: CPT

## 2022-08-22 PROCEDURE — 97112 NEUROMUSCULAR REEDUCATION: CPT

## 2022-08-22 PROCEDURE — 97110 THERAPEUTIC EXERCISES: CPT

## 2022-08-22 PROCEDURE — 97530 THERAPEUTIC ACTIVITIES: CPT
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
## 2022-08-26 ENCOUNTER — OFFICE VISIT (OUTPATIENT)
Dept: PHYSICAL THERAPY | Facility: CLINIC | Age: 27
End: 2022-08-26
Payer: OTHER MISCELLANEOUS

## 2022-08-26 DIAGNOSIS — Z98.890 S/P ORIF (OPEN REDUCTION INTERNAL FIXATION) FRACTURE: Primary | ICD-10-CM

## 2022-08-26 DIAGNOSIS — Z87.81 S/P ORIF (OPEN REDUCTION INTERNAL FIXATION) FRACTURE: Primary | ICD-10-CM

## 2022-08-26 PROCEDURE — 97110 THERAPEUTIC EXERCISES: CPT

## 2022-08-26 PROCEDURE — 97112 NEUROMUSCULAR REEDUCATION: CPT

## 2022-08-26 PROCEDURE — 97530 THERAPEUTIC ACTIVITIES: CPT
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
## 2022-08-29 ENCOUNTER — OFFICE VISIT (OUTPATIENT)
Dept: PHYSICAL THERAPY | Facility: CLINIC | Age: 27
End: 2022-08-29
Payer: OTHER MISCELLANEOUS

## 2022-08-29 DIAGNOSIS — Z87.81 S/P ORIF (OPEN REDUCTION INTERNAL FIXATION) FRACTURE: Primary | ICD-10-CM

## 2022-08-29 DIAGNOSIS — Z98.890 S/P ORIF (OPEN REDUCTION INTERNAL FIXATION) FRACTURE: Primary | ICD-10-CM

## 2022-08-29 PROCEDURE — 97140 MANUAL THERAPY 1/> REGIONS: CPT

## 2022-08-29 PROCEDURE — 97110 THERAPEUTIC EXERCISES: CPT

## 2022-08-29 PROCEDURE — 97112 NEUROMUSCULAR REEDUCATION: CPT
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
## 2022-09-02 ENCOUNTER — OFFICE VISIT (OUTPATIENT)
Dept: PHYSICAL THERAPY | Facility: CLINIC | Age: 27
End: 2022-09-02
Payer: OTHER MISCELLANEOUS

## 2022-09-02 DIAGNOSIS — Z98.890 S/P ORIF (OPEN REDUCTION INTERNAL FIXATION) FRACTURE: Primary | ICD-10-CM

## 2022-09-02 DIAGNOSIS — Z87.81 S/P ORIF (OPEN REDUCTION INTERNAL FIXATION) FRACTURE: Primary | ICD-10-CM

## 2022-09-02 PROCEDURE — 97530 THERAPEUTIC ACTIVITIES: CPT

## 2022-09-02 PROCEDURE — 97110 THERAPEUTIC EXERCISES: CPT

## 2022-09-02 PROCEDURE — 97112 NEUROMUSCULAR REEDUCATION: CPT
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
## 2022-09-09 ENCOUNTER — OFFICE VISIT (OUTPATIENT)
Dept: PHYSICAL THERAPY | Facility: CLINIC | Age: 27
End: 2022-09-09
Payer: OTHER MISCELLANEOUS

## 2022-09-09 DIAGNOSIS — Z98.890 S/P ORIF (OPEN REDUCTION INTERNAL FIXATION) FRACTURE: Primary | ICD-10-CM

## 2022-09-09 DIAGNOSIS — Z87.81 S/P ORIF (OPEN REDUCTION INTERNAL FIXATION) FRACTURE: Primary | ICD-10-CM

## 2022-09-09 PROCEDURE — 97110 THERAPEUTIC EXERCISES: CPT

## 2022-09-09 PROCEDURE — 97112 NEUROMUSCULAR REEDUCATION: CPT

## 2022-09-09 PROCEDURE — 97530 THERAPEUTIC ACTIVITIES: CPT
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
## 2022-09-14 ENCOUNTER — OFFICE VISIT (OUTPATIENT)
Dept: PHYSICAL THERAPY | Facility: CLINIC | Age: 27
End: 2022-09-14
Payer: OTHER MISCELLANEOUS

## 2022-09-14 DIAGNOSIS — Z87.81 S/P ORIF (OPEN REDUCTION INTERNAL FIXATION) FRACTURE: Primary | ICD-10-CM

## 2022-09-14 DIAGNOSIS — Z98.890 S/P ORIF (OPEN REDUCTION INTERNAL FIXATION) FRACTURE: Primary | ICD-10-CM

## 2022-09-14 PROCEDURE — 97112 NEUROMUSCULAR REEDUCATION: CPT

## 2022-09-14 PROCEDURE — 97140 MANUAL THERAPY 1/> REGIONS: CPT

## 2022-09-14 PROCEDURE — 97110 THERAPEUTIC EXERCISES: CPT
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
## 2022-09-16 ENCOUNTER — OFFICE VISIT (OUTPATIENT)
Dept: PHYSICAL THERAPY | Facility: CLINIC | Age: 27
End: 2022-09-16
Payer: OTHER MISCELLANEOUS

## 2022-09-16 DIAGNOSIS — Z98.890 S/P ORIF (OPEN REDUCTION INTERNAL FIXATION) FRACTURE: Primary | ICD-10-CM

## 2022-09-16 DIAGNOSIS — Z87.81 S/P ORIF (OPEN REDUCTION INTERNAL FIXATION) FRACTURE: Primary | ICD-10-CM

## 2022-09-16 PROCEDURE — 97140 MANUAL THERAPY 1/> REGIONS: CPT

## 2022-09-16 PROCEDURE — 97110 THERAPEUTIC EXERCISES: CPT

## 2022-09-16 PROCEDURE — 97112 NEUROMUSCULAR REEDUCATION: CPT
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
## 2022-09-19 ENCOUNTER — OFFICE VISIT (OUTPATIENT)
Dept: PHYSICAL THERAPY | Facility: CLINIC | Age: 27
End: 2022-09-19
Payer: OTHER MISCELLANEOUS

## 2022-09-19 DIAGNOSIS — Z98.890 S/P ORIF (OPEN REDUCTION INTERNAL FIXATION) FRACTURE: Primary | ICD-10-CM

## 2022-09-19 DIAGNOSIS — Z87.81 S/P ORIF (OPEN REDUCTION INTERNAL FIXATION) FRACTURE: Primary | ICD-10-CM

## 2022-09-19 PROCEDURE — 97112 NEUROMUSCULAR REEDUCATION: CPT

## 2022-09-19 PROCEDURE — 97110 THERAPEUTIC EXERCISES: CPT

## 2022-09-19 PROCEDURE — 97140 MANUAL THERAPY 1/> REGIONS: CPT
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
## 2022-09-23 ENCOUNTER — OFFICE VISIT (OUTPATIENT)
Dept: PHYSICAL THERAPY | Facility: CLINIC | Age: 27
End: 2022-09-23
Payer: OTHER MISCELLANEOUS

## 2022-09-23 DIAGNOSIS — Z98.890 S/P ORIF (OPEN REDUCTION INTERNAL FIXATION) FRACTURE: Primary | ICD-10-CM

## 2022-09-23 DIAGNOSIS — Z87.81 S/P ORIF (OPEN REDUCTION INTERNAL FIXATION) FRACTURE: Primary | ICD-10-CM

## 2022-09-23 PROCEDURE — 97140 MANUAL THERAPY 1/> REGIONS: CPT

## 2022-09-23 PROCEDURE — 97110 THERAPEUTIC EXERCISES: CPT

## 2022-09-23 PROCEDURE — 97112 NEUROMUSCULAR REEDUCATION: CPT
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
## 2022-09-26 ENCOUNTER — OFFICE VISIT (OUTPATIENT)
Dept: PHYSICAL THERAPY | Facility: CLINIC | Age: 27
End: 2022-09-26
Payer: OTHER MISCELLANEOUS

## 2022-09-26 DIAGNOSIS — Z98.890 S/P ORIF (OPEN REDUCTION INTERNAL FIXATION) FRACTURE: Primary | ICD-10-CM

## 2022-09-26 DIAGNOSIS — Z87.81 S/P ORIF (OPEN REDUCTION INTERNAL FIXATION) FRACTURE: Primary | ICD-10-CM

## 2022-09-26 PROCEDURE — 97164 PT RE-EVAL EST PLAN CARE: CPT
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
## 2022-09-28 ENCOUNTER — TELEPHONE (OUTPATIENT)
Dept: OBGYN CLINIC | Facility: HOSPITAL | Age: 27
End: 2022-09-28

## 2022-09-28 NOTE — TELEPHONE ENCOUNTER
LM to inform pt that Dr Yovana Wick would not be available for appt on 10/28  Asked that pt please call back in order to reschedule their appt

## 2022-09-30 ENCOUNTER — OFFICE VISIT (OUTPATIENT)
Dept: PHYSICAL THERAPY | Facility: CLINIC | Age: 27
End: 2022-09-30
Payer: OTHER MISCELLANEOUS

## 2022-09-30 DIAGNOSIS — Z98.890 S/P ORIF (OPEN REDUCTION INTERNAL FIXATION) FRACTURE: Primary | ICD-10-CM

## 2022-09-30 DIAGNOSIS — Z87.81 S/P ORIF (OPEN REDUCTION INTERNAL FIXATION) FRACTURE: Primary | ICD-10-CM

## 2022-09-30 PROCEDURE — 97112 NEUROMUSCULAR REEDUCATION: CPT

## 2022-09-30 PROCEDURE — 97110 THERAPEUTIC EXERCISES: CPT

## 2022-09-30 PROCEDURE — 97140 MANUAL THERAPY 1/> REGIONS: CPT
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
## 2022-10-03 ENCOUNTER — OFFICE VISIT (OUTPATIENT)
Dept: PHYSICAL THERAPY | Facility: CLINIC | Age: 27
End: 2022-10-03
Payer: OTHER MISCELLANEOUS

## 2022-10-03 DIAGNOSIS — Z98.890 S/P ORIF (OPEN REDUCTION INTERNAL FIXATION) FRACTURE: Primary | ICD-10-CM

## 2022-10-03 DIAGNOSIS — Z87.81 S/P ORIF (OPEN REDUCTION INTERNAL FIXATION) FRACTURE: Primary | ICD-10-CM

## 2022-10-03 PROCEDURE — 97110 THERAPEUTIC EXERCISES: CPT

## 2022-10-03 PROCEDURE — 97112 NEUROMUSCULAR REEDUCATION: CPT

## 2022-10-03 PROCEDURE — 97140 MANUAL THERAPY 1/> REGIONS: CPT
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
## 2022-10-05 NOTE — ASSESSMENT & PLAN NOTE
- Continue current medication regimen   - Outpatient follow-up with PCP  Addended by: Paul George on: 10/5/2022 03:13 PM     Modules accepted: Orders

## 2022-10-07 ENCOUNTER — OFFICE VISIT (OUTPATIENT)
Dept: PHYSICAL THERAPY | Facility: CLINIC | Age: 27
End: 2022-10-07
Payer: OTHER MISCELLANEOUS

## 2022-10-07 DIAGNOSIS — Z87.81 S/P ORIF (OPEN REDUCTION INTERNAL FIXATION) FRACTURE: Primary | ICD-10-CM

## 2022-10-07 DIAGNOSIS — Z98.890 S/P ORIF (OPEN REDUCTION INTERNAL FIXATION) FRACTURE: Primary | ICD-10-CM

## 2022-10-07 PROCEDURE — 97140 MANUAL THERAPY 1/> REGIONS: CPT

## 2022-10-07 PROCEDURE — 97110 THERAPEUTIC EXERCISES: CPT
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
## 2022-10-12 ENCOUNTER — OFFICE VISIT (OUTPATIENT)
Dept: PHYSICAL THERAPY | Facility: CLINIC | Age: 27
End: 2022-10-12
Payer: OTHER MISCELLANEOUS

## 2022-10-12 DIAGNOSIS — Z98.890 S/P ORIF (OPEN REDUCTION INTERNAL FIXATION) FRACTURE: Primary | ICD-10-CM

## 2022-10-12 DIAGNOSIS — Z87.81 S/P ORIF (OPEN REDUCTION INTERNAL FIXATION) FRACTURE: Primary | ICD-10-CM

## 2022-10-12 PROCEDURE — 97112 NEUROMUSCULAR REEDUCATION: CPT

## 2022-10-12 PROCEDURE — 97110 THERAPEUTIC EXERCISES: CPT

## 2022-10-12 PROCEDURE — 97535 SELF CARE MNGMENT TRAINING: CPT

## 2022-10-12 PROCEDURE — 97140 MANUAL THERAPY 1/> REGIONS: CPT
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
## 2022-10-14 ENCOUNTER — OFFICE VISIT (OUTPATIENT)
Dept: PHYSICAL THERAPY | Facility: CLINIC | Age: 27
End: 2022-10-14
Payer: OTHER MISCELLANEOUS

## 2022-10-14 DIAGNOSIS — Z87.81 S/P ORIF (OPEN REDUCTION INTERNAL FIXATION) FRACTURE: Primary | ICD-10-CM

## 2022-10-14 DIAGNOSIS — Z98.890 S/P ORIF (OPEN REDUCTION INTERNAL FIXATION) FRACTURE: Primary | ICD-10-CM

## 2022-10-14 PROCEDURE — 97110 THERAPEUTIC EXERCISES: CPT

## 2022-10-14 PROCEDURE — 97112 NEUROMUSCULAR REEDUCATION: CPT

## 2022-10-14 PROCEDURE — 97530 THERAPEUTIC ACTIVITIES: CPT
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
## 2022-10-17 ENCOUNTER — OFFICE VISIT (OUTPATIENT)
Dept: PHYSICAL THERAPY | Facility: CLINIC | Age: 27
End: 2022-10-17
Payer: OTHER MISCELLANEOUS

## 2022-10-17 DIAGNOSIS — Z87.81 S/P ORIF (OPEN REDUCTION INTERNAL FIXATION) FRACTURE: Primary | ICD-10-CM

## 2022-10-17 DIAGNOSIS — Z98.890 S/P ORIF (OPEN REDUCTION INTERNAL FIXATION) FRACTURE: Primary | ICD-10-CM

## 2022-10-17 PROCEDURE — 97140 MANUAL THERAPY 1/> REGIONS: CPT

## 2022-10-17 PROCEDURE — 97530 THERAPEUTIC ACTIVITIES: CPT

## 2022-10-17 PROCEDURE — 97112 NEUROMUSCULAR REEDUCATION: CPT

## 2022-10-17 PROCEDURE — 97110 THERAPEUTIC EXERCISES: CPT
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
## 2022-10-21 ENCOUNTER — OFFICE VISIT (OUTPATIENT)
Dept: PHYSICAL THERAPY | Facility: CLINIC | Age: 27
End: 2022-10-21
Payer: OTHER MISCELLANEOUS

## 2022-10-21 DIAGNOSIS — Z87.81 S/P ORIF (OPEN REDUCTION INTERNAL FIXATION) FRACTURE: Primary | ICD-10-CM

## 2022-10-21 DIAGNOSIS — Z98.890 S/P ORIF (OPEN REDUCTION INTERNAL FIXATION) FRACTURE: Primary | ICD-10-CM

## 2022-10-21 PROCEDURE — 97530 THERAPEUTIC ACTIVITIES: CPT

## 2022-10-21 PROCEDURE — 97110 THERAPEUTIC EXERCISES: CPT

## 2022-10-21 PROCEDURE — 97112 NEUROMUSCULAR REEDUCATION: CPT

## 2022-10-26 ENCOUNTER — OFFICE VISIT (OUTPATIENT)
Dept: PHYSICAL THERAPY | Facility: CLINIC | Age: 27
End: 2022-10-26
Payer: OTHER MISCELLANEOUS

## 2022-10-26 DIAGNOSIS — Z98.890 S/P ORIF (OPEN REDUCTION INTERNAL FIXATION) FRACTURE: Primary | ICD-10-CM

## 2022-10-26 DIAGNOSIS — Z87.81 S/P ORIF (OPEN REDUCTION INTERNAL FIXATION) FRACTURE: Primary | ICD-10-CM

## 2022-10-26 PROCEDURE — 97530 THERAPEUTIC ACTIVITIES: CPT

## 2022-10-26 PROCEDURE — 97110 THERAPEUTIC EXERCISES: CPT

## 2022-10-26 PROCEDURE — 97014 ELECTRIC STIMULATION THERAPY: CPT

## 2022-10-26 PROCEDURE — 97112 NEUROMUSCULAR REEDUCATION: CPT
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
## 2022-10-28 ENCOUNTER — OFFICE VISIT (OUTPATIENT)
Dept: PHYSICAL THERAPY | Facility: CLINIC | Age: 27
End: 2022-10-28
Payer: OTHER MISCELLANEOUS

## 2022-10-28 DIAGNOSIS — Z98.890 S/P ORIF (OPEN REDUCTION INTERNAL FIXATION) FRACTURE: Primary | ICD-10-CM

## 2022-10-28 DIAGNOSIS — Z87.81 S/P ORIF (OPEN REDUCTION INTERNAL FIXATION) FRACTURE: Primary | ICD-10-CM

## 2022-10-28 PROCEDURE — 97140 MANUAL THERAPY 1/> REGIONS: CPT

## 2022-10-28 PROCEDURE — 97110 THERAPEUTIC EXERCISES: CPT

## 2022-10-28 PROCEDURE — 97112 NEUROMUSCULAR REEDUCATION: CPT

## 2022-10-28 PROCEDURE — 97530 THERAPEUTIC ACTIVITIES: CPT
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
## 2022-11-14 ENCOUNTER — APPOINTMENT (OUTPATIENT)
Dept: PHYSICAL THERAPY | Facility: CLINIC | Age: 27
End: 2022-11-14
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
## 2022-11-17 ENCOUNTER — TELEPHONE (OUTPATIENT)
Dept: OBGYN CLINIC | Facility: MEDICAL CENTER | Age: 27
End: 2022-11-17

## 2022-11-17 NOTE — TELEPHONE ENCOUNTER
Caller: Sera Beatty at [address]: Susan Flores    Reason for call:     Confirming appointments, confirmed      Call back#: n/a

## 2022-11-28 ENCOUNTER — EVALUATION (OUTPATIENT)
Dept: PHYSICAL THERAPY | Facility: CLINIC | Age: 27
End: 2022-11-28

## 2022-11-28 DIAGNOSIS — Z87.81 S/P ORIF (OPEN REDUCTION INTERNAL FIXATION) FRACTURE: Primary | ICD-10-CM

## 2022-11-28 DIAGNOSIS — Z98.890 S/P ORIF (OPEN REDUCTION INTERNAL FIXATION) FRACTURE: Primary | ICD-10-CM
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
## 2022-12-05 ENCOUNTER — APPOINTMENT (OUTPATIENT)
Dept: PHYSICAL THERAPY | Facility: CLINIC | Age: 27
End: 2022-12-05

## 2022-12-06 ENCOUNTER — OFFICE VISIT (OUTPATIENT)
Dept: PHYSICAL THERAPY | Facility: CLINIC | Age: 27
End: 2022-12-06

## 2022-12-06 DIAGNOSIS — Z87.81 S/P ORIF (OPEN REDUCTION INTERNAL FIXATION) FRACTURE: Primary | ICD-10-CM

## 2022-12-06 DIAGNOSIS — Z98.890 S/P ORIF (OPEN REDUCTION INTERNAL FIXATION) FRACTURE: Primary | ICD-10-CM

## 2022-12-08 ENCOUNTER — OFFICE VISIT (OUTPATIENT)
Dept: PHYSICAL THERAPY | Facility: CLINIC | Age: 27
End: 2022-12-08

## 2022-12-08 DIAGNOSIS — Z98.890 S/P ORIF (OPEN REDUCTION INTERNAL FIXATION) FRACTURE: Primary | ICD-10-CM

## 2022-12-08 DIAGNOSIS — Z87.81 S/P ORIF (OPEN REDUCTION INTERNAL FIXATION) FRACTURE: Primary | ICD-10-CM
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
## 2022-12-10 DIAGNOSIS — Z87.81 S/P ORIF (OPEN REDUCTION INTERNAL FIXATION) FRACTURE: Primary | ICD-10-CM

## 2022-12-10 DIAGNOSIS — Z98.890 S/P ORIF (OPEN REDUCTION INTERNAL FIXATION) FRACTURE: Primary | ICD-10-CM

## 2022-12-12 ENCOUNTER — OFFICE VISIT (OUTPATIENT)
Dept: PHYSICAL THERAPY | Facility: CLINIC | Age: 27
End: 2022-12-12

## 2022-12-12 DIAGNOSIS — Z87.81 S/P ORIF (OPEN REDUCTION INTERNAL FIXATION) FRACTURE: Primary | ICD-10-CM

## 2022-12-12 DIAGNOSIS — Z98.890 S/P ORIF (OPEN REDUCTION INTERNAL FIXATION) FRACTURE: Primary | ICD-10-CM

## 2022-12-14 ENCOUNTER — OFFICE VISIT (OUTPATIENT)
Dept: OBGYN CLINIC | Facility: HOSPITAL | Age: 27
End: 2022-12-14

## 2022-12-14 ENCOUNTER — HOSPITAL ENCOUNTER (OUTPATIENT)
Dept: RADIOLOGY | Facility: HOSPITAL | Age: 27
Discharge: HOME | End: 2022-12-14
Attending: ORTHOPAEDIC SURGERY

## 2022-12-14 VITALS
HEIGHT: 70 IN | DIASTOLIC BLOOD PRESSURE: 99 MMHG | SYSTOLIC BLOOD PRESSURE: 139 MMHG | BODY MASS INDEX: 45.1 KG/M2 | HEART RATE: 90 BPM | WEIGHT: 315 LBS

## 2022-12-14 DIAGNOSIS — M79.89 SWELLING OF LEFT LOWER EXTREMITY: ICD-10-CM

## 2022-12-14 DIAGNOSIS — S82.872S CLOSED DISPLACED PILON FRACTURE OF LEFT TIBIA, SEQUELA: Primary | ICD-10-CM

## 2022-12-14 DIAGNOSIS — Z87.81 S/P ORIF (OPEN REDUCTION INTERNAL FIXATION) FRACTURE: ICD-10-CM

## 2022-12-14 DIAGNOSIS — M25.672 STIFFNESS OF LEFT ANKLE JOINT: ICD-10-CM

## 2022-12-14 DIAGNOSIS — Z98.890 S/P ORIF (OPEN REDUCTION INTERNAL FIXATION) FRACTURE: ICD-10-CM

## 2022-12-14 DIAGNOSIS — R29.898 ANKLE WEAKNESS: ICD-10-CM

## 2022-12-14 NOTE — LETTER
December 14, 2022     Patient: Paty Fink  YOB: 1995  Date of Visit: 12/14/2022      To Whom it May Concern:    Barbara Lemus is under my professional care  Bren Misael was seen in my office on 12/14/2022  Bren Misael is able to return to work on sedentary duty only  If you have any questions or concerns, please don't hesitate to call           Sincerely,          Makenna Zavaleta MD        CC: No Recipients

## 2022-12-14 NOTE — PROGRESS NOTES
Assessment:   Diagnosis ICD-10-CM Associated Orders   1  Closed displaced pilon fracture of left tibia, sequela  S82 872S Ambulatory Referral to Physical Therapy     Compression Stocking      2  S/P ORIF (open reduction internal fixation) fracture  Z98 890 Ambulatory Referral to Physical Therapy    Z87 81 Compression Stocking      3  Stiffness of left ankle joint  M25 672 Ambulatory Referral to Physical Therapy      4  Ankle weakness  R29 898 Ambulatory Referral to Physical Therapy      5  Swelling of left lower extremity  M79 89 Compression Stocking        9 months sp ORIF L pilon, removal of exfix    Plan:  • X-rays taken and reviewed, physical exam performed  • Overall doing well but still progressing  • Continue PT  • Script given for compression stockings  • WBAT  • Return to work: sedentary duty only - only seated positions      To do next visit:  Return in about 2 months (around 2/14/2023) for re-check with x-rays left ankle  The above stated was discussed in layman's terms and the patient expressed understanding  All questions were answered to the patient's satisfaction  Scribe Attestation    I,:  Matthew Santiago am acting as a scribe while in the presence of the attending physician :       I,:  Joseph Elias MD personally performed the services described in this documentation    as scribed in my presence :             Subjective:   Yumiko Gonzales  is a 28 y o  male who presents with his mother for repeat evaluation of his left ankle 9 months s/p ORIF left ankle pilon fracture, 3/3/2022  Overall he is doing well  He notes pain, swelling, and fatigue with prolonged weight bearing  He has not yet returned to work  He has since underwent his cardiac procedure through [facility]      He presents today in the office wearing [employer] slides      Review of systems negative unless otherwise specified in HPI  Review of Systems    Past Medical History:   Diagnosis Date   • Enchondroma of hand bone     last assessed: 4/21/2015   • Heart trouble    • Hypertension    • Palpitations        Past Surgical History:   Procedure Laterality Date   • EXTERNAL FIXATOR APPLICATION Left 7/87/6939    Procedure: APPLICATION EXTERNAL FIXATION DEVICE LOWER EXTREMITY;  Surgeon: Xiao Ludwig MD;  Location: [facility];  Service: Orthopedics   • NO PAST SURGERIES     • ORIF TIBIA FRACTURE Left 3/3/2022    Procedure: OPEN REDUCTION W/ INTERNAL FIXATION (ORIF) LEFT TIBIA PILON FRACTURE, REMOVAL OF EXTERNAL FIXATOR;  Surgeon: Xiao Ludwig MD;  Location: [facility];  Service: Orthopedics       Family History   Problem Relation Age of Onset   • No Known Problems Mother    • No Known Problems Father    • Autism Brother         autistic diosrder   • Diabetes type II Paternal Grandmother         mellitus   • Autism Brother         autistic diosrder   • Depression Other        Social History     Occupational History   • Not on file   Tobacco Use   • Smoking status: Never   • Smokeless tobacco: Never   Vaping Use   • Vaping Use: Never used   Substance and Sexual Activity   • Alcohol use: No   • Drug use: No   • Sexual activity: Not on file         Current Outpatient Medications:   •  acetaminophen (TYLENOL) 325 mg tablet, Take 3 tablets (975 mg total) by mouth every 8 (eight) hours as needed for mild pain, Disp: , Rfl: 0  •  Albuterol (PROVENTIL IN), Inhale, Disp: , Rfl:   •  atenolol (TENORMIN) 25 mg tablet, Take 1 tablet (25 mg total) by mouth daily, Disp: 30 tablet, Rfl: 5  •  atorvastatin (LIPITOR) 20 mg tablet, Take 20 mg by mouth daily, Disp: , Rfl:   •  carvedilol (COREG) 25 mg tablet, Take 25 mg by mouth 2 (two) times a day with meals, Disp: , Rfl:   •  Empagliflozin-metFORMIN HCl (Synjardy) 12 5-500 MG TABS, Take by mouth 2 (two) times a day, Disp: , Rfl:   •  enalapril (VASOTEC) 5 mg tablet, Take 1 tablet (5 mg total) by mouth 2 (two) times a day, Disp: 30 tablet, Rfl: 5  •  isosorbide-hydrALAZINE (BIDIL) 20-37 5 MG per tablet, Take 2 tablets by mouth 3 (three) times a day, Disp: , Rfl:   •  ivabradine HCl (Corlanor) 5 MG tablet, Take 5 mg by mouth 2 (two) times a day, Disp: , Rfl:   •  oxyCODONE (Roxicodone) 5 immediate release tablet, Take 1 tablet (5 mg total) by mouth every 6 (six) hours as needed for moderate pain Max Daily Amount: 20 mg, Disp: 20 tablet, Rfl: 0  •  sacubitril-valsartan (Entresto)  MG TABS, Take 1 tablet by mouth 2 (two) times a day, Disp: , Rfl:   •  Semaglutide (OZEMPIC, 0 25 OR 0 5 MG/DOSE, SC), Inject 0 25 mg under the skin every 7 days, Disp: , Rfl:   •  spironolactone (ALDACTONE) 25 mg tablet, Take 25 mg by mouth daily, Disp: , Rfl:   •  enoxaparin (LOVENOX) 60 mg/0 6 mL, Inject 0 6 mL (60 mg total) under the skin every 12 (twelve) hours, Disp: 50 4 mL, Rfl: 0  •  oxyCODONE (ROXICODONE) 10 MG TABS, You may take 5 mg (0 5 tab) for moderate pain or 10 mg (1 tab) for severe pain, every 4 hours, as needed  (Patient not taking: Reported on 12/14/2022), Disp: 21 tablet, Rfl: 0  •  senna-docusate sodium (SENOKOT-S) 8 6-50 mg per tablet, Take 1 tablet by mouth 2 (two) times a day for 14 days Continue to take while taking Dilaudid , Disp: 28 tablet, Rfl: 0    Allergies   Allergen Reactions   • Banana - Food Allergy Other (See Comments)         • Bee Venom Other (See Comments)         • Pollen Extract             Vitals:    12/14/22 1138   BP: 139/99   Pulse: 90       Objective:                    Left Ankle Exam     Tenderness   The patient is experiencing no tenderness  Left ankle swelling: modest     Range of Motion   Dorsiflexion: 10   Plantar flexion: 30     Other   Erythema: absent  Sensation: normal    Comments:    Healed incisions, no warmth, no signs of infection  Calf is soft and non-tender, no signs of DVT  Fairly good ROM which is painless  Fairly good strength however has quick fatigue  Diagnostics, reviewed and taken today if performed as documented:     The attending physician has personally reviewed the pertinent films in PACS and interpretation is as follows:    Left ankle x-rays taken and reviewed in the office today and show: well healed pilon fracture with abundant callous/osseous formation present  Hardware remains in excellent position and alignment with evidence of hardware failure  Procedures, if performed today:    Procedures    None performed      Portions of the record may have been created with voice recognition software  Occasional wrong word or "sound a like" substitutions may have occurred due to the inherent limitations of voice recognition software  Read the chart carefully and recognize, using context, where substitutions have occurred

## 2022-12-14 NOTE — PATIENT INSTRUCTIONS
Diagnosis ICD-10-CM Associated Orders   1  Closed displaced pilon fracture of left tibia, sequela  S82 872S Ambulatory Referral to Physical Therapy     Compression Stocking      2  S/P ORIF (open reduction internal fixation) fracture  Z98 890 Ambulatory Referral to Physical Therapy    Z87 81 Compression Stocking      3  Stiffness of left ankle joint  M25 672 Ambulatory Referral to Physical Therapy      4  Ankle weakness  R29 898 Ambulatory Referral to Physical Therapy      5  Swelling of left lower extremity  M79 89 Compression Stocking        Continue PT  WBAT  Work restrictions as discussed  Script given for compression stockings  Return in about 2 months (around 2/14/2023) for re-check with x-rays left ankle

## 2022-12-15 ENCOUNTER — TELEPHONE (OUTPATIENT)
Dept: OBGYN CLINIC | Facility: HOSPITAL | Age: 27
End: 2022-12-15

## 2022-12-15 ENCOUNTER — APPOINTMENT (OUTPATIENT)
Dept: PHYSICAL THERAPY | Facility: CLINIC | Age: 27
End: 2022-12-15

## 2022-12-15 NOTE — TELEPHONE ENCOUNTER
Caller: Zakia Villalpando from [place]  Doctor: Jered Kemp    Reason for call: electronically faxed 12/14 OVN to fax # [phone]    Call back#:

## 2022-12-16 ENCOUNTER — TELEPHONE (OUTPATIENT)
Dept: OBGYN CLINIC | Facility: MEDICAL CENTER | Age: 27
End: 2022-12-16

## 2022-12-16 ENCOUNTER — TELEPHONE (OUTPATIENT)
Dept: OBGYN CLINIC | Facility: HOSPITAL | Age: 27
End: 2022-12-16

## 2022-12-16 NOTE — TELEPHONE ENCOUNTER
Caller: [employer]    Doctor/Office: Octavio KRAUS#:       What needs to be faxed: work note from 12/14/22    ATTN to: Elvira Castro    Fax#: [phone]      Documents were successfully e-faxed

## 2022-12-16 NOTE — TELEPHONE ENCOUNTER
Caller: Rowena Favre at [address]: Terry Cline    Reason for call:     Rowena Favre requesting to fax work status and office notes from 12/14/22 to You at [phone]    Completed    Call back#: n/a

## 2022-12-16 NOTE — TELEPHONE ENCOUNTER
Caller: [employer]/ lorraine       Reason for call: Calling to confirm patient appt     Call back#: n/a

## 2022-12-19 ENCOUNTER — OFFICE VISIT (OUTPATIENT)
Dept: PHYSICAL THERAPY | Facility: CLINIC | Age: 27
End: 2022-12-19

## 2022-12-19 DIAGNOSIS — Z98.890 S/P ORIF (OPEN REDUCTION INTERNAL FIXATION) FRACTURE: Primary | ICD-10-CM

## 2022-12-19 DIAGNOSIS — Z87.81 S/P ORIF (OPEN REDUCTION INTERNAL FIXATION) FRACTURE: Primary | ICD-10-CM
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
## 2022-12-22 ENCOUNTER — APPOINTMENT (OUTPATIENT)
Dept: PHYSICAL THERAPY | Facility: CLINIC | Age: 27
End: 2022-12-22

## 2022-12-29 ENCOUNTER — OFFICE VISIT (OUTPATIENT)
Dept: PHYSICAL THERAPY | Facility: CLINIC | Age: 27
End: 2022-12-29

## 2022-12-29 DIAGNOSIS — Z98.890 S/P ORIF (OPEN REDUCTION INTERNAL FIXATION) FRACTURE: Primary | ICD-10-CM

## 2022-12-29 DIAGNOSIS — Z87.81 S/P ORIF (OPEN REDUCTION INTERNAL FIXATION) FRACTURE: Primary | ICD-10-CM
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
## 2023-01-02 ENCOUNTER — APPOINTMENT (OUTPATIENT)
Dept: PHYSICAL THERAPY | Facility: CLINIC | Age: 28
End: 2023-01-02

## 2023-01-04 ENCOUNTER — TELEPHONE (OUTPATIENT)
Dept: OBGYN CLINIC | Facility: HOSPITAL | Age: 28
End: 2023-01-04

## 2023-01-04 NOTE — TELEPHONE ENCOUNTER
Miriam: Ronaldo    Doctor/Office: Octavio KRAUS#: n/a      What needs to be faxed: Compression stocking order    ATTN to: Ronaldo    Fax#: [phone]      Needs to be faxed

## 2023-01-05 ENCOUNTER — OFFICE VISIT (OUTPATIENT)
Dept: PHYSICAL THERAPY | Facility: CLINIC | Age: 28
End: 2023-01-05

## 2023-01-05 DIAGNOSIS — Z87.81 S/P ORIF (OPEN REDUCTION INTERNAL FIXATION) FRACTURE: Primary | ICD-10-CM

## 2023-01-05 DIAGNOSIS — Z98.890 S/P ORIF (OPEN REDUCTION INTERNAL FIXATION) FRACTURE: Primary | ICD-10-CM
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
## 2023-01-09 ENCOUNTER — OFFICE VISIT (OUTPATIENT)
Dept: PHYSICAL THERAPY | Facility: CLINIC | Age: 28
End: 2023-01-09

## 2023-01-09 DIAGNOSIS — Z87.81 S/P ORIF (OPEN REDUCTION INTERNAL FIXATION) FRACTURE: Primary | ICD-10-CM

## 2023-01-09 DIAGNOSIS — Z98.890 S/P ORIF (OPEN REDUCTION INTERNAL FIXATION) FRACTURE: Primary | ICD-10-CM
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
## 2023-01-12 ENCOUNTER — APPOINTMENT (OUTPATIENT)
Dept: PHYSICAL THERAPY | Facility: CLINIC | Age: 28
End: 2023-01-12

## 2023-01-16 ENCOUNTER — OFFICE VISIT (OUTPATIENT)
Dept: PHYSICAL THERAPY | Facility: CLINIC | Age: 28
End: 2023-01-16

## 2023-01-16 DIAGNOSIS — Z87.81 S/P ORIF (OPEN REDUCTION INTERNAL FIXATION) FRACTURE: Primary | ICD-10-CM

## 2023-01-16 DIAGNOSIS — Z98.890 S/P ORIF (OPEN REDUCTION INTERNAL FIXATION) FRACTURE: Primary | ICD-10-CM
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
## 2023-01-18 ENCOUNTER — TELEPHONE (OUTPATIENT)
Dept: OBGYN CLINIC | Facility: HOSPITAL | Age: 28
End: 2023-01-18

## 2023-01-18 NOTE — TELEPHONE ENCOUNTER
Caller: Venecia/Jed Nurse     Doctor: Deja Tan    Reason for call: Called to ask if Dr Deja Tan cancelled appointment due to surgery? Advise no  Contacted office  Spoke w/Khanh confirmed Dr Deja Tan wasn't in surgery this morning       Call back#: [phone]

## 2023-01-19 ENCOUNTER — OFFICE VISIT (OUTPATIENT)
Dept: PHYSICAL THERAPY | Facility: CLINIC | Age: 28
End: 2023-01-19

## 2023-01-19 DIAGNOSIS — Z98.890 S/P ORIF (OPEN REDUCTION INTERNAL FIXATION) FRACTURE: Primary | ICD-10-CM

## 2023-01-19 DIAGNOSIS — Z87.81 S/P ORIF (OPEN REDUCTION INTERNAL FIXATION) FRACTURE: Primary | ICD-10-CM
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
## 2023-01-23 ENCOUNTER — OFFICE VISIT (OUTPATIENT)
Dept: PHYSICAL THERAPY | Facility: CLINIC | Age: 28
End: 2023-01-23

## 2023-01-23 DIAGNOSIS — Z98.890 S/P ORIF (OPEN REDUCTION INTERNAL FIXATION) FRACTURE: Primary | ICD-10-CM

## 2023-01-23 DIAGNOSIS — Z87.81 S/P ORIF (OPEN REDUCTION INTERNAL FIXATION) FRACTURE: Primary | ICD-10-CM
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
## 2023-01-26 ENCOUNTER — OFFICE VISIT (OUTPATIENT)
Dept: PHYSICAL THERAPY | Facility: CLINIC | Age: 28
End: 2023-01-26

## 2023-01-26 DIAGNOSIS — Z98.890 S/P ORIF (OPEN REDUCTION INTERNAL FIXATION) FRACTURE: Primary | ICD-10-CM

## 2023-01-26 DIAGNOSIS — Z87.81 S/P ORIF (OPEN REDUCTION INTERNAL FIXATION) FRACTURE: Primary | ICD-10-CM
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
## 2023-01-30 ENCOUNTER — OFFICE VISIT (OUTPATIENT)
Dept: PHYSICAL THERAPY | Facility: CLINIC | Age: 28
End: 2023-01-30

## 2023-01-30 DIAGNOSIS — S82.872S CLOSED DISPLACED PILON FRACTURE OF LEFT TIBIA, SEQUELA: ICD-10-CM

## 2023-01-30 DIAGNOSIS — M25.672 STIFFNESS OF LEFT ANKLE JOINT: ICD-10-CM

## 2023-01-30 DIAGNOSIS — Z98.890 S/P ORIF (OPEN REDUCTION INTERNAL FIXATION) FRACTURE: Primary | ICD-10-CM

## 2023-01-30 DIAGNOSIS — R29.898 ANKLE WEAKNESS: ICD-10-CM

## 2023-01-30 DIAGNOSIS — Z87.81 S/P ORIF (OPEN REDUCTION INTERNAL FIXATION) FRACTURE: Primary | ICD-10-CM
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
## 2023-02-02 ENCOUNTER — APPOINTMENT (OUTPATIENT)
Dept: PHYSICAL THERAPY | Facility: CLINIC | Age: 28
End: 2023-02-02

## 2023-02-03 ENCOUNTER — TELEPHONE (OUTPATIENT)
Dept: OBGYN CLINIC | Facility: HOSPITAL | Age: 28
End: 2023-02-03

## 2023-02-03 NOTE — TELEPHONE ENCOUNTER
Caller: Kavita Mendez    Doctor: Molly Vera    Reason for call: patient wanted to talk to a nurse because he has questions regarding his L Ankle, transferred call to [facility]    Call back#: [phone]

## 2023-02-03 NOTE — TELEPHONE ENCOUNTER
Pt called and spoke w/pt and his mom, per pt's request   Pt went back to work on Monday to Friday for 4 hours a day  His left ankle is so swollen he can't put a shoe on  Pt is not wearing compression stockings and he cannot get them passed his toes  He went into work in [employer] and this is not safe  He was told that this footwear is not safe to wear  He was told that they cannot accommodate him sitting in a chair  Then his hours were cut to 2 hours a day 3 days a week  The manager did try to accommodate him with a chair even though they cannot put a chair in kitchen  Today again he tried to go in again wearing flip flops and did not last 2 hours  Next week w/Flores's Day, she will not be able to use the chair as they will have extra staff and it is not fair for him to sit and other workers to stand  At this point, they are not sure what they can do  This is a WC case  Now they are not allowing him to do light duty and not allowing him to sit  He is taking regular Motrin 200mg 3 tablets every 4 hours, Pain level remains a 6-7  Advise decrease Motrin to every 6 hours with food per label instructions  Add Tylenol 325mg 2 tablets every 6 hours as per label instructions  Tylenol max is 3000mg in a 24hour period  Pt does elevate when at home  Sometimes icing but heat helps more  1   Please advise re: work restrictions, compression stockings (needs larger size)  2   Please advise re: pain management  Thanks

## 2023-02-06 ENCOUNTER — OFFICE VISIT (OUTPATIENT)
Dept: PHYSICAL THERAPY | Facility: CLINIC | Age: 28
End: 2023-02-06

## 2023-02-06 DIAGNOSIS — S82.872S CLOSED DISPLACED PILON FRACTURE OF LEFT TIBIA, SEQUELA: ICD-10-CM

## 2023-02-06 DIAGNOSIS — R29.898 ANKLE WEAKNESS: ICD-10-CM

## 2023-02-06 DIAGNOSIS — Z98.890 S/P ORIF (OPEN REDUCTION INTERNAL FIXATION) FRACTURE: Primary | ICD-10-CM

## 2023-02-06 DIAGNOSIS — M25.672 STIFFNESS OF LEFT ANKLE JOINT: ICD-10-CM

## 2023-02-06 DIAGNOSIS — Z87.81 S/P ORIF (OPEN REDUCTION INTERNAL FIXATION) FRACTURE: Primary | ICD-10-CM
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
## 2023-02-09 ENCOUNTER — OFFICE VISIT (OUTPATIENT)
Dept: PHYSICAL THERAPY | Facility: CLINIC | Age: 28
End: 2023-02-09

## 2023-02-09 DIAGNOSIS — S82.872S CLOSED DISPLACED PILON FRACTURE OF LEFT TIBIA, SEQUELA: ICD-10-CM

## 2023-02-09 DIAGNOSIS — Z87.81 S/P ORIF (OPEN REDUCTION INTERNAL FIXATION) FRACTURE: Primary | ICD-10-CM

## 2023-02-09 DIAGNOSIS — Z98.890 S/P ORIF (OPEN REDUCTION INTERNAL FIXATION) FRACTURE: Primary | ICD-10-CM

## 2023-02-09 DIAGNOSIS — R29.898 ANKLE WEAKNESS: ICD-10-CM

## 2023-02-09 DIAGNOSIS — M25.672 STIFFNESS OF LEFT ANKLE JOINT: ICD-10-CM

## 2023-02-09 RX ORDER — TRAMADOL HYDROCHLORIDE 100 MG/1
100 TABLET, COATED ORAL 4 TIMES DAILY PRN
Qty: 45 TABLET | Refills: 0 | Status: ON HOLD | OUTPATIENT
Start: 2023-02-09
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
## 2023-02-10 NOTE — TELEPHONE ENCOUNTER
Patient was provided above information  Patient and mother state patient is only lasting an hour at work  They cannot accommodate him seated for his job  Advised that if patient cannot be provided a sedentary duty then it is up to his job to take him out of work as this is a workers comp injury  Please advise      Clinical staff - Please fax new order for compression stockings to You at [address] [facility]

## 2023-02-11 DIAGNOSIS — Z98.890 S/P ORIF (OPEN REDUCTION INTERNAL FIXATION) FRACTURE: Primary | ICD-10-CM

## 2023-02-11 DIAGNOSIS — Z87.81 S/P ORIF (OPEN REDUCTION INTERNAL FIXATION) FRACTURE: Primary | ICD-10-CM

## 2023-02-13 ENCOUNTER — APPOINTMENT (OUTPATIENT)
Dept: PHYSICAL THERAPY | Facility: CLINIC | Age: 28
End: 2023-02-13
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
## 2023-02-15 ENCOUNTER — OFFICE VISIT (OUTPATIENT)
Dept: OBGYN CLINIC | Facility: HOSPITAL | Age: 28
End: 2023-02-15

## 2023-02-15 ENCOUNTER — HOSPITAL ENCOUNTER (OUTPATIENT)
Dept: RADIOLOGY | Facility: HOSPITAL | Age: 28
Discharge: HOME | End: 2023-02-15
Attending: ORTHOPAEDIC SURGERY

## 2023-02-15 VITALS — HEIGHT: 70 IN | BODY MASS INDEX: 45.1 KG/M2 | WEIGHT: 315 LBS

## 2023-02-15 DIAGNOSIS — Z98.890 S/P ORIF (OPEN REDUCTION INTERNAL FIXATION) FRACTURE: Primary | ICD-10-CM

## 2023-02-15 DIAGNOSIS — Z87.81 S/P ORIF (OPEN REDUCTION INTERNAL FIXATION) FRACTURE: ICD-10-CM

## 2023-02-15 DIAGNOSIS — M25.672 STIFFNESS OF LEFT ANKLE JOINT: ICD-10-CM

## 2023-02-15 DIAGNOSIS — S82.872S CLOSED DISPLACED PILON FRACTURE OF LEFT TIBIA, SEQUELA: ICD-10-CM

## 2023-02-15 DIAGNOSIS — Z87.81 S/P ORIF (OPEN REDUCTION INTERNAL FIXATION) FRACTURE: Primary | ICD-10-CM

## 2023-02-15 DIAGNOSIS — Z98.890 S/P ORIF (OPEN REDUCTION INTERNAL FIXATION) FRACTURE: ICD-10-CM

## 2023-02-15 DIAGNOSIS — R29.898 ANKLE WEAKNESS: ICD-10-CM

## 2023-02-15 DIAGNOSIS — M79.89 SWELLING OF LEFT LOWER EXTREMITY: ICD-10-CM

## 2023-02-15 NOTE — LETTER
February 15, 2023     Patient: Man Lobato  YOB: 1995  Date of Visit: 2/15/2023      To Whom it May Concern:    Braun Obey is under my professional care  Michelle Solares was seen in my office on 2/15/2023  The patent should be out of work until follow up visit  If you have any questions or concerns, please don't hesitate to call           Sincerely,          Ahmet Arriola MD        CC: No Recipients

## 2023-02-15 NOTE — PROGRESS NOTES
Assessment:  1  S/P ORIF (open reduction internal fixation) fracture  CT lower extremity wo contrast left    CBC and differential    Sedimentation rate, automated    C-reactive protein    MRI ankle/heel left w wo contrast      2  Closed displaced pilon fracture of left tibia, sequela  CT lower extremity wo contrast left    CBC and differential    Sedimentation rate, automated    C-reactive protein    MRI ankle/heel left w wo contrast      3  Stiffness of left ankle joint  CT lower extremity wo contrast left    CBC and differential    Sedimentation rate, automated    C-reactive protein    MRI ankle/heel left w wo contrast      4  Ankle weakness  CT lower extremity wo contrast left    CBC and differential    Sedimentation rate, automated    C-reactive protein    MRI ankle/heel left w wo contrast      5  Swelling of left lower extremity  CT lower extremity wo contrast left    CBC and differential    Sedimentation rate, automated    C-reactive protein    MRI ankle/heel left w wo contrast          Plan:  11 months s/p left ankle pilon fracture ORIF, 3/3/2022  · Patient continues to have significant pain especially with standing  Has persistent leg swelling preventing regular shoewear  · Left ankle MRI with contrast and CT, both with metal reducing, ordered to evaluate nature of fracture and potential complications  · Patient has cardiac devices  If patient is unable to have contrast MRI then the left ankle CT will be replaced with left ankle CT with contrast  · Blood work ordered:  · CBC  · C-reactive protein  · Sedimentation rate  · Follow up for left ankle CT and MRI review    Work status:  Patient to be out of work until follow up  · Patient has attempted to work with restriction with continued significant left ankle pain        To do next visit:  Return for left ankle MRI and CT review   The above stated was discussed in layman's terms and the patient expressed understanding    All questions were answered to the patient's satisfaction  Scribe Attestation    I,:  Gray Marroquin am acting as a scribe while in the presence of the attending physician :       I,:  Gray Escobedo MD personally performed the services described in this documentation    as scribed in my presence :             Subjective:   Moira Bardales  is a 28 y o  male who presents nearly one year s/p left ankle pilon fracture ORIF, 3/3/2022  He is progressing slowly  Today he complains of constant fluctuating global left ankle pain with swelling  He rates his pain at 6/10 and 10/10 at its worse  Cold weather and standing any amount of time aggravates while rest alleviates  He continues with physical therapy with progress  He does use Tylenol and Motrin for pain  He has attempted to work under restrictions yet has been having difficulties          Review of systems negative unless otherwise specified in HPI    Past Medical History:   Diagnosis Date   • Enchondroma of hand bone     last assessed: 4/21/2015   • Heart trouble    • Hypertension    • Palpitations        Past Surgical History:   Procedure Laterality Date   • EXTERNAL FIXATOR APPLICATION Left 7/56/7928    Procedure: APPLICATION EXTERNAL FIXATION DEVICE LOWER EXTREMITY;  Surgeon: Gray Escobedo MD;  Location: [facility];  Service: Orthopedics   • NO PAST SURGERIES     • ORIF TIBIA FRACTURE Left 3/3/2022    Procedure: OPEN REDUCTION W/ INTERNAL FIXATION (ORIF) LEFT TIBIA PILON FRACTURE, REMOVAL OF EXTERNAL FIXATOR;  Surgeon: Gray Escobedo MD;  Location: [facility];  Service: Orthopedics       Family History   Problem Relation Age of Onset   • No Known Problems Mother    • No Known Problems Father    • Autism Brother         autistic diosrder   • Diabetes type II Paternal Grandmother         mellitus   • Autism Brother         autistic diosrder   • Depression Other        Social History     Occupational History   • Not on file   Tobacco Use   • Smoking status: Never   • Smokeless tobacco: Never   Vaping Use   • Vaping Use: Never used   Substance and Sexual Activity   • Alcohol use: No   • Drug use: No   • Sexual activity: Not on file         Current Outpatient Medications:   •  acetaminophen (TYLENOL) 325 mg tablet, Take 3 tablets (975 mg total) by mouth every 8 (eight) hours as needed for mild pain, Disp: , Rfl: 0  •  Albuterol (PROVENTIL IN), Inhale, Disp: , Rfl:   •  atenolol (TENORMIN) 25 mg tablet, Take 1 tablet (25 mg total) by mouth daily, Disp: 30 tablet, Rfl: 5  •  atorvastatin (LIPITOR) 20 mg tablet, Take 20 mg by mouth daily, Disp: , Rfl:   •  carvedilol (COREG) 25 mg tablet, Take 25 mg by mouth 2 (two) times a day with meals, Disp: , Rfl:   •  Empagliflozin-metFORMIN HCl (Synjardy) 12 5-500 MG TABS, Take by mouth 2 (two) times a day, Disp: , Rfl:   •  enalapril (VASOTEC) 5 mg tablet, Take 1 tablet (5 mg total) by mouth 2 (two) times a day, Disp: 30 tablet, Rfl: 5  •  isosorbide-hydrALAZINE (BIDIL) 20-37 5 MG per tablet, Take 2 tablets by mouth 3 (three) times a day, Disp: , Rfl:   •  ivabradine HCl (Corlanor) 5 MG tablet, Take 5 mg by mouth 2 (two) times a day, Disp: , Rfl:   •  oxyCODONE (ROXICODONE) 10 MG TABS, You may take 5 mg (0 5 tab) for moderate pain or 10 mg (1 tab) for severe pain, every 4 hours, as needed  , Disp: 21 tablet, Rfl: 0  •  oxyCODONE (Roxicodone) 5 immediate release tablet, Take 1 tablet (5 mg total) by mouth every 6 (six) hours as needed for moderate pain Max Daily Amount: 20 mg, Disp: 20 tablet, Rfl: 0  •  sacubitril-valsartan (Entresto)  MG TABS, Take 1 tablet by mouth 2 (two) times a day, Disp: , Rfl:   •  Semaglutide (OZEMPIC, 0 25 OR 0 5 MG/DOSE, SC), Inject 0 25 mg under the skin every 7 days, Disp: , Rfl:   •  spironolactone (ALDACTONE) 25 mg tablet, Take 25 mg by mouth daily, Disp: , Rfl:   •  traMADol HCl 100 MG TABS, Take 100 mg by mouth 4 (four) times a day as needed (pain), Disp: 45 tablet, Rfl: 0  •  enoxaparin (LOVENOX) 60 mg/0 6 mL, Inject 0 6 mL (60 mg total) under the skin every 12 (twelve) hours, Disp: 50 4 mL, Rfl: 0  •  senna-docusate sodium (SENOKOT-S) 8 6-50 mg per tablet, Take 1 tablet by mouth 2 (two) times a day for 14 days Continue to take while taking Dilaudid , Disp: 28 tablet, Rfl: 0    Allergies   Allergen Reactions   • Banana - Food Allergy Other (See Comments)         • Bee Venom Other (See Comments)         • Pollen Extract             Vitals:       Objective:  Physical exam  · General: Awake, Alert, Oriented  · Eyes: Pupils equal, round and reactive to light  · Heart: regular rate and rhythm  · Lungs: No audible wheezing  · Abdomen: soft                    Ortho Exam  Left ankle:  No erythema or ecchymosis  Moderate distal swelling, no swelling of contralateral side  Mild tenderness over fracture sites  Calf compartments soft and supple  Sensation intact  Toes are warm sensate and mobile      Diagnostics, reviewed and taken today if performed as documented: The attending physician has personally reviewed the pertinent films in PACS and interpretation is as follows:  Left ankle x-ray:  No evidence of hardware failure  Very minimal fracture line present with ample callus formation  Procedures, if performed today:    Procedures    None performed      Portions of the record may have been created with voice recognition software  Occasional wrong word or "sound a like" substitutions may have occurred due to the inherent limitations of voice recognition software  Read the chart carefully and recognize, using context, where substitutions have occurred

## 2023-02-16 ENCOUNTER — OFFICE VISIT (OUTPATIENT)
Dept: PHYSICAL THERAPY | Facility: CLINIC | Age: 28
End: 2023-02-16

## 2023-02-16 DIAGNOSIS — Z87.81 S/P ORIF (OPEN REDUCTION INTERNAL FIXATION) FRACTURE: Primary | ICD-10-CM

## 2023-02-16 DIAGNOSIS — R29.898 ANKLE WEAKNESS: ICD-10-CM

## 2023-02-16 DIAGNOSIS — Z98.890 S/P ORIF (OPEN REDUCTION INTERNAL FIXATION) FRACTURE: Primary | ICD-10-CM

## 2023-02-16 DIAGNOSIS — M25.672 STIFFNESS OF LEFT ANKLE JOINT: ICD-10-CM

## 2023-02-16 DIAGNOSIS — S82.872S CLOSED DISPLACED PILON FRACTURE OF LEFT TIBIA, SEQUELA: ICD-10-CM
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
## 2023-02-17 ENCOUNTER — HOSPITAL ENCOUNTER (INPATIENT)
Facility: HOSPITAL | Age: 28
LOS: 3 days | Discharge: ANOTHER HOSPITAL | End: 2023-02-21
Attending: EMERGENCY MEDICINE | Admitting: EMERGENCY MEDICINE

## 2023-02-17 ENCOUNTER — APPOINTMENT (EMERGENCY)
Dept: RADIOLOGY | Facility: HOSPITAL | Age: 28
End: 2023-02-17

## 2023-02-17 DIAGNOSIS — R79.89 ELEVATED LACTIC ACID LEVEL: ICD-10-CM

## 2023-02-17 DIAGNOSIS — G93.40 ACUTE ENCEPHALOPATHY: ICD-10-CM

## 2023-02-17 DIAGNOSIS — N17.9 AKI (ACUTE KIDNEY INJURY) (HCC): ICD-10-CM

## 2023-02-17 DIAGNOSIS — R03.0 ELEVATED BLOOD PRESSURE READING: ICD-10-CM

## 2023-02-17 DIAGNOSIS — I50.9 ACUTE EXACERBATION OF CHF (CONGESTIVE HEART FAILURE) (HCC): Primary | ICD-10-CM

## 2023-02-17 DIAGNOSIS — R55 SYNCOPE: ICD-10-CM

## 2023-02-17 LAB
ATRIAL RATE: 102 BPM
BASE EX.OXY STD BLDV CALC-SCNC: 97.9 % (ref 60–80)
BASE EXCESS BLDV CALC-SCNC: 3 MMOL/L
BASOPHILS # BLD AUTO: 0.04 THOUSANDS/ÂΜL (ref 0–0.1)
BASOPHILS NFR BLD AUTO: 1 % (ref 0–1)
CARDIAC TROPONIN I PNL SERPL HS: 111 NG/L
EOSINOPHIL # BLD AUTO: 0.19 THOUSAND/ÂΜL (ref 0–0.61)
EOSINOPHIL NFR BLD AUTO: 3 % (ref 0–6)
ERYTHROCYTE [DISTWIDTH] IN BLOOD BY AUTOMATED COUNT: 14.9 % (ref 11.6–15.1)
GLUCOSE SERPL-MCNC: 171 MG/DL (ref 65–140)
HCO3 BLDV-SCNC: 29 MMOL/L (ref 24–30)
HCT VFR BLD AUTO: 45.2 % (ref 36.5–49.3)
HGB BLD-MCNC: 14.2 G/DL (ref 12–17)
IMM GRANULOCYTES # BLD AUTO: 0.03 THOUSAND/UL (ref 0–0.2)
IMM GRANULOCYTES NFR BLD AUTO: 0 % (ref 0–2)
LYMPHOCYTES # BLD AUTO: 1.8 THOUSANDS/ÂΜL (ref 0.6–4.47)
LYMPHOCYTES NFR BLD AUTO: 23 % (ref 14–44)
MCH RBC QN AUTO: 28 PG (ref 26.8–34.3)
MCHC RBC AUTO-ENTMCNC: 31.4 G/DL (ref 31.4–37.4)
MCV RBC AUTO: 89 FL (ref 82–98)
MONOCYTES # BLD AUTO: 0.7 THOUSAND/ÂΜL (ref 0.17–1.22)
MONOCYTES NFR BLD AUTO: 9 % (ref 4–12)
NEUTROPHILS # BLD AUTO: 4.96 THOUSANDS/ÂΜL (ref 1.85–7.62)
NEUTS SEG NFR BLD AUTO: 64 % (ref 43–75)
NRBC BLD AUTO-RTO: 0 /100 WBCS
O2 CT BLDV-SCNC: 22.8 ML/DL
P AXIS: 51 DEGREES
PCO2 BLDV: 49.1 MM HG (ref 42–50)
PH BLDV: 7.39 [PH] (ref 7.3–7.4)
PLATELET # BLD AUTO: 253 THOUSANDS/UL (ref 149–390)
PMV BLD AUTO: 10.3 FL (ref 8.9–12.7)
PO2 BLDV: 197.8 MM HG (ref 35–45)
PR INTERVAL: 192 MS
QRS AXIS: -11 DEGREES
QRSD INTERVAL: 104 MS
QT INTERVAL: 334 MS
QTC INTERVAL: 435 MS
RBC # BLD AUTO: 5.07 MILLION/UL (ref 3.88–5.62)
T WAVE AXIS: 111 DEGREES
VENTRICULAR RATE: 102 BPM
WBC # BLD AUTO: 7.72 THOUSAND/UL (ref 4.31–10.16)

## 2023-02-17 RX ORDER — ALBUTEROL SULFATE 2.5 MG/3ML
1 SOLUTION RESPIRATORY (INHALATION) ONCE
Status: COMPLETED | OUTPATIENT
Start: 2023-02-17 | End: 2023-02-17

## 2023-02-17 RX ORDER — FUROSEMIDE 10 MG/ML
40 INJECTION INTRAMUSCULAR; INTRAVENOUS ONCE
Status: COMPLETED | OUTPATIENT
Start: 2023-02-17 | End: 2023-02-17

## 2023-02-17 RX ORDER — IPRATROPIUM BROMIDE AND ALBUTEROL SULFATE .5; 3 MG/3ML; MG/3ML
1 SOLUTION RESPIRATORY (INHALATION) ONCE
Status: COMPLETED | OUTPATIENT
Start: 2023-02-17 | End: 2023-02-17

## 2023-02-17 RX ADMIN — FUROSEMIDE 40 MG: 10 INJECTION, SOLUTION INTRAMUSCULAR; INTRAVENOUS at 23:17

## 2023-02-18 ENCOUNTER — APPOINTMENT (EMERGENCY)
Dept: RADIOLOGY | Facility: HOSPITAL | Age: 28
End: 2023-02-18

## 2023-02-18 ENCOUNTER — APPOINTMENT (INPATIENT)
Dept: NON INVASIVE DIAGNOSTICS | Facility: HOSPITAL | Age: 28
End: 2023-02-18

## 2023-02-18 PROBLEM — R55 SYNCOPE: Status: ACTIVE | Noted: 2023-02-18

## 2023-02-18 PROBLEM — G93.40 ENCEPHALOPATHY: Status: ACTIVE | Noted: 2023-02-18

## 2023-02-18 PROBLEM — R77.8 ELEVATED TROPONIN: Status: ACTIVE | Noted: 2023-02-18

## 2023-02-18 PROBLEM — R79.89 ELEVATED TROPONIN: Status: ACTIVE | Noted: 2023-02-18

## 2023-02-18 PROBLEM — I42.8 NONISCHEMIC CARDIOMYOPATHY (HCC): Status: ACTIVE | Noted: 2023-02-18

## 2023-02-18 PROBLEM — J96.01 ACUTE HYPOXEMIC RESPIRATORY FAILURE (HCC): Status: ACTIVE | Noted: 2023-02-18

## 2023-02-18 LAB
2HR DELTA HS TROPONIN: -26 NG/L
2HR DELTA HS TROPONIN: 30 NG/L
4HR DELTA HS TROPONIN: 40 NG/L
ALBUMIN SERPL BCP-MCNC: 2.9 G/DL (ref 3.5–5)
ALP SERPL-CCNC: 83 U/L (ref 46–116)
ALT SERPL W P-5'-P-CCNC: 22 U/L (ref 12–78)
AMPHETAMINES SERPL QL SCN: NEGATIVE
ANION GAP SERPL CALCULATED.3IONS-SCNC: 7 MMOL/L (ref 4–13)
ANION GAP SERPL CALCULATED.3IONS-SCNC: 8 MMOL/L (ref 4–13)
APAP SERPL-MCNC: <2 UG/ML (ref 10–20)
AST SERPL W P-5'-P-CCNC: 31 U/L (ref 5–45)
ATRIAL RATE: 91 BPM
BARBITURATES UR QL: NEGATIVE
BASE EX.OXY STD BLDV CALC-SCNC: 67.4 % (ref 60–80)
BASE EXCESS BLDV CALC-SCNC: 8.3 MMOL/L
BASOPHILS # BLD AUTO: 0.04 THOUSANDS/ÂΜL (ref 0–0.1)
BASOPHILS NFR BLD AUTO: 1 % (ref 0–1)
BENZODIAZ UR QL: NEGATIVE
BILIRUB SERPL-MCNC: 0.48 MG/DL (ref 0.2–1)
BUN SERPL-MCNC: 16 MG/DL (ref 5–25)
BUN SERPL-MCNC: 18 MG/DL (ref 5–25)
CALCIUM ALBUM COR SERPL-MCNC: 10 MG/DL (ref 8.3–10.1)
CALCIUM SERPL-MCNC: 8.9 MG/DL (ref 8.3–10.1)
CALCIUM SERPL-MCNC: 9.1 MG/DL (ref 8.3–10.1)
CARDIAC TROPONIN I PNL SERPL HS: 141 NG/L
CARDIAC TROPONIN I PNL SERPL HS: 145 NG/L
CARDIAC TROPONIN I PNL SERPL HS: 151 NG/L
CARDIAC TROPONIN I PNL SERPL HS: 171 NG/L
CHLORIDE SERPL-SCNC: 102 MMOL/L (ref 96–108)
CHLORIDE SERPL-SCNC: 104 MMOL/L (ref 96–108)
CO2 SERPL-SCNC: 25 MMOL/L (ref 21–32)
CO2 SERPL-SCNC: 25 MMOL/L (ref 21–32)
COCAINE UR QL: NEGATIVE
CREAT SERPL-MCNC: 1.29 MG/DL (ref 0.6–1.3)
CREAT SERPL-MCNC: 1.52 MG/DL (ref 0.6–1.3)
EOSINOPHIL # BLD AUTO: 0.1 THOUSAND/ÂΜL (ref 0–0.61)
EOSINOPHIL NFR BLD AUTO: 1 % (ref 0–6)
ERYTHROCYTE [DISTWIDTH] IN BLOOD BY AUTOMATED COUNT: 14.9 % (ref 11.6–15.1)
ETHANOL SERPL-MCNC: <3 MG/DL (ref 0–3)
FLUAV RNA RESP QL NAA+PROBE: NEGATIVE
FLUBV RNA RESP QL NAA+PROBE: NEGATIVE
GFR SERPL CREATININE-BSD FRML MDRD: 61 ML/MIN/1.73SQ M
GFR SERPL CREATININE-BSD FRML MDRD: 74 ML/MIN/1.73SQ M
GLUCOSE SERPL-MCNC: 102 MG/DL (ref 65–140)
GLUCOSE SERPL-MCNC: 111 MG/DL (ref 65–140)
GLUCOSE SERPL-MCNC: 114 MG/DL (ref 65–140)
GLUCOSE SERPL-MCNC: 117 MG/DL (ref 65–140)
GLUCOSE SERPL-MCNC: 120 MG/DL (ref 65–140)
GLUCOSE SERPL-MCNC: 131 MG/DL (ref 65–140)
GLUCOSE SERPL-MCNC: 187 MG/DL (ref 65–140)
HCO3 BLDV-SCNC: 37.2 MMOL/L (ref 24–30)
HCT VFR BLD AUTO: 46.5 % (ref 36.5–49.3)
HGB BLD-MCNC: 14 G/DL (ref 12–17)
IMM GRANULOCYTES # BLD AUTO: 0.02 THOUSAND/UL (ref 0–0.2)
IMM GRANULOCYTES NFR BLD AUTO: 0 % (ref 0–2)
LACTATE SERPL-SCNC: 1.6 MMOL/L (ref 0.5–2)
LACTATE SERPL-SCNC: 2.8 MMOL/L (ref 0.5–2)
LYMPHOCYTES # BLD AUTO: 1.72 THOUSANDS/ÂΜL (ref 0.6–4.47)
LYMPHOCYTES NFR BLD AUTO: 22 % (ref 14–44)
MAGNESIUM SERPL-MCNC: 2.6 MG/DL (ref 1.6–2.6)
MCH RBC QN AUTO: 27.5 PG (ref 26.8–34.3)
MCHC RBC AUTO-ENTMCNC: 30.1 G/DL (ref 31.4–37.4)
MCV RBC AUTO: 91 FL (ref 82–98)
METHADONE UR QL: NEGATIVE
MONOCYTES # BLD AUTO: 0.68 THOUSAND/ÂΜL (ref 0.17–1.22)
MONOCYTES NFR BLD AUTO: 9 % (ref 4–12)
NEUTROPHILS # BLD AUTO: 5.16 THOUSANDS/ÂΜL (ref 1.85–7.62)
NEUTS SEG NFR BLD AUTO: 67 % (ref 43–75)
NRBC BLD AUTO-RTO: 0 /100 WBCS
NT-PROBNP SERPL-MCNC: 903 PG/ML
O2 CT BLDV-SCNC: 14.1 ML/DL
OPIATES UR QL SCN: NEGATIVE
OXYCODONE+OXYMORPHONE UR QL SCN: NEGATIVE
P AXIS: 54 DEGREES
PCO2 BLDV: 71.7 MM HG (ref 42–50)
PCP UR QL: NEGATIVE
PH BLDV: 7.33 [PH] (ref 7.3–7.4)
PHOSPHATE SERPL-MCNC: 4.5 MG/DL (ref 2.7–4.5)
PLATELET # BLD AUTO: 273 THOUSANDS/UL (ref 149–390)
PMV BLD AUTO: 11.2 FL (ref 8.9–12.7)
PO2 BLDV: 38.8 MM HG (ref 35–45)
POTASSIUM SERPL-SCNC: 3.6 MMOL/L (ref 3.5–5.3)
POTASSIUM SERPL-SCNC: 4.4 MMOL/L (ref 3.5–5.3)
PR INTERVAL: 184 MS
PROT SERPL-MCNC: 7.7 G/DL (ref 6.4–8.4)
QRS AXIS: 17 DEGREES
QRSD INTERVAL: 104 MS
QT INTERVAL: 368 MS
QTC INTERVAL: 452 MS
RBC # BLD AUTO: 5.09 MILLION/UL (ref 3.88–5.62)
RSV RNA RESP QL NAA+PROBE: NEGATIVE
SALICYLATES SERPL-MCNC: <3 MG/DL (ref 3–20)
SARS-COV-2 RNA RESP QL NAA+PROBE: NEGATIVE
SODIUM SERPL-SCNC: 134 MMOL/L (ref 135–147)
SODIUM SERPL-SCNC: 137 MMOL/L (ref 135–147)
T WAVE AXIS: 120 DEGREES
THC UR QL: NEGATIVE
VENTRICULAR RATE: 91 BPM
WBC # BLD AUTO: 7.72 THOUSAND/UL (ref 4.31–10.16)

## 2023-02-18 RX ORDER — SPIRONOLACTONE 25 MG/1
25 TABLET ORAL DAILY
Status: DISCONTINUED | OUTPATIENT
Start: 2023-02-19 | End: 2023-02-18

## 2023-02-18 RX ORDER — ALBUTEROL SULFATE 90 UG/1
2 AEROSOL, METERED RESPIRATORY (INHALATION) EVERY 4 HOURS PRN
Status: DISCONTINUED | OUTPATIENT
Start: 2023-02-18 | End: 2023-02-21 | Stop reason: HOSPADM

## 2023-02-18 RX ORDER — CHLORHEXIDINE GLUCONATE 0.12 MG/ML
15 RINSE ORAL EVERY 12 HOURS SCHEDULED
Status: DISCONTINUED | OUTPATIENT
Start: 2023-02-18 | End: 2023-02-19

## 2023-02-18 RX ORDER — FUROSEMIDE 10 MG/ML
80 INJECTION INTRAMUSCULAR; INTRAVENOUS
Status: DISCONTINUED | OUTPATIENT
Start: 2023-02-19 | End: 2023-02-20

## 2023-02-18 RX ORDER — ACETAMINOPHEN 325 MG/1
975 TABLET ORAL EVERY 8 HOURS PRN
Status: DISCONTINUED | OUTPATIENT
Start: 2023-02-18 | End: 2023-02-21 | Stop reason: HOSPADM

## 2023-02-18 RX ORDER — FUROSEMIDE 10 MG/ML
80 INJECTION INTRAMUSCULAR; INTRAVENOUS ONCE
Status: COMPLETED | OUTPATIENT
Start: 2023-02-18 | End: 2023-02-18

## 2023-02-18 RX ORDER — ATORVASTATIN CALCIUM 20 MG/1
20 TABLET, FILM COATED ORAL DAILY
Status: DISCONTINUED | OUTPATIENT
Start: 2023-02-19 | End: 2023-02-21 | Stop reason: HOSPADM

## 2023-02-18 RX ORDER — INSULIN LISPRO 100 [IU]/ML
2-12 INJECTION, SOLUTION INTRAVENOUS; SUBCUTANEOUS
Status: DISCONTINUED | OUTPATIENT
Start: 2023-02-18 | End: 2023-02-21 | Stop reason: HOSPADM

## 2023-02-18 RX ORDER — CARVEDILOL 25 MG/1
25 TABLET ORAL 2 TIMES DAILY WITH MEALS
Status: DISCONTINUED | OUTPATIENT
Start: 2023-02-18 | End: 2023-02-20

## 2023-02-18 RX ORDER — HEPARIN SODIUM 5000 [USP'U]/ML
5000 INJECTION, SOLUTION INTRAVENOUS; SUBCUTANEOUS EVERY 8 HOURS SCHEDULED
Status: DISCONTINUED | OUTPATIENT
Start: 2023-02-18 | End: 2023-02-18 | Stop reason: DRUGHIGH

## 2023-02-18 RX ORDER — SPIRONOLACTONE 50 MG/1
50 TABLET, FILM COATED ORAL DAILY
Status: DISCONTINUED | OUTPATIENT
Start: 2023-02-19 | End: 2023-02-21 | Stop reason: HOSPADM

## 2023-02-18 RX ORDER — HEPARIN SODIUM 5000 [USP'U]/ML
7500 INJECTION, SOLUTION INTRAVENOUS; SUBCUTANEOUS EVERY 8 HOURS SCHEDULED
Status: DISCONTINUED | OUTPATIENT
Start: 2023-02-18 | End: 2023-02-19

## 2023-02-18 RX ADMIN — HEPARIN SODIUM 7500 UNITS: 5000 INJECTION INTRAVENOUS; SUBCUTANEOUS at 13:57

## 2023-02-18 RX ADMIN — CHLORHEXIDINE GLUCONATE 0.12% ORAL RINSE 15 ML: 1.2 LIQUID ORAL at 21:26

## 2023-02-18 RX ADMIN — CARVEDILOL 25 MG: 25 TABLET, FILM COATED ORAL at 17:08

## 2023-02-18 RX ADMIN — FUROSEMIDE 80 MG: 10 INJECTION, SOLUTION INTRAVENOUS at 15:56

## 2023-02-18 RX ADMIN — HEPARIN SODIUM 7500 UNITS: 5000 INJECTION INTRAVENOUS; SUBCUTANEOUS at 06:07

## 2023-02-18 RX ADMIN — SODIUM CHLORIDE, PRESERVATIVE FREE 0.04 MG: 5 INJECTION INTRAVENOUS at 09:13

## 2023-02-18 RX ADMIN — HEPARIN SODIUM 7500 UNITS: 5000 INJECTION INTRAVENOUS; SUBCUTANEOUS at 21:26

## 2023-02-18 RX ADMIN — CHLORHEXIDINE GLUCONATE 0.12% ORAL RINSE 15 ML: 1.2 LIQUID ORAL at 08:24

## 2023-02-18 RX ADMIN — PERFLUTREN 0.6 ML/MIN: 6.52 INJECTION, SUSPENSION INTRAVENOUS at 14:00

## 2023-02-18 NOTE — CONSULTS
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  Currently examines volume up, no evidence of low output state, peripheries well perfused  Echo images reviewed, LVEF is severely reduced, IVC <2 5cm, with collapse  Home GDMT: carvediolol 25mg bid, Entresto 97-103mg daily, spironolactone 50mg daily  Home diuretic: torsemide 80 mg daily (recently increased to 100 daily for 1 week)  Per patient, he is compliant with medications, salt and fluid restriction  No events recorded on device interrogation    DM2  Hypertension  UBALDO on CPAP    Plan  1  Will start IV lasix 80 mg bid  2  Monitor weight, UOP  3  Will add home GDMT tomorrow if BP and renal function is stable  4  Follow up echo results      HPI: Robyn Diaz  is a 28y o  year old male who has a history of hypertension, LV non compaction, HFrEF EF 20-25%, non ischemic cardiomyopathy s/p Marcellus S ICD, DM2, UBALDO  He follows with [facility] cardiology, last seen on 2/6/2023  He presented with loss of consciousness and collapse (siezure like activity) when he was out with his friends  On arrival he was non verbal on presentation  Patients mother states that over the last week he has been feeling short of breath, with some orthopnea  No chest pain, palpitations  On admission to the ED, he had stable blood pressure with tachycardia  EKG showed sinus rhythm, non specific T inversions, ABG had a venous O2 of 97  9  he had an DAVID with creatinine of 1 52, NT pro BNP of 903 (6400 in April 2022)  There is mild troponin elevation as well  Urine TOX is negative  CT head was negative for intracranial pathology  CXR with possible pulmonary edema  He was placed on BIPAP and started on IV lasix (received total 160 mg so far) for CHF exacerbation  During his last office visit on 2/6/2023 he had appeared volume overload (patient forgot to take the morning torsemide dose) and was advised to increase torsemide to 100 mg daily for 1 week (home dose 80 mg daily)  He is on GDMT with coreg, entresto, and spironolactone  He had a hospital admission in April of 2022 with cardiogenic shock requiring milrinone  Currently his mental status is improving and is now weaned off of BIPAP  ICD interrogated without events: shock zone 220          Review of Systems   Constitutional: Positive for malaise/fatigue  HENT: Negative  Eyes: Negative  Cardiovascular: Positive for dyspnea on exertion, leg swelling, orthopnea and syncope  Negative for chest pain, near-syncope, palpitations and paroxysmal nocturnal dyspnea  Respiratory: Positive for shortness of breath  Endocrine: Negative  Hematologic/Lymphatic: Negative  Genitourinary: Negative  All other systems reviewed and are negative      Historical Information   Past Medical History:   Diagnosis Date   • Enchondroma of hand bone     last assessed: 4/21/2015   • Heart trouble    • Hypertension    • Palpitations      Past Surgical History:   Procedure Laterality Date   • EXTERNAL FIXATOR APPLICATION Left 1/86/8900    Procedure: APPLICATION EXTERNAL FIXATION DEVICE LOWER EXTREMITY;  Surgeon: Darinel Gr MD;  Location: [facility];  Service: Orthopedics   • NO PAST SURGERIES     • ORIF TIBIA FRACTURE Left 3/3/2022    Procedure: OPEN REDUCTION W/ INTERNAL FIXATION (ORIF) LEFT TIBIA PILON FRACTURE, REMOVAL OF EXTERNAL FIXATOR;  Surgeon: Darinel Gr MD;  Location: [facility];  Service: Orthopedics     Social History     Substance and Sexual Activity   Alcohol Use No     Social History     Substance and Sexual Activity   Drug Use No     Social History     Tobacco Use   Smoking Status Never   Smokeless Tobacco Never     Family History:   Family History   Problem Relation Age of Onset   • No Known Problems Mother    • No Known Problems Father    • Autism Brother         autistic jemder   • Diabetes type II Paternal Grandmother         mellitus   • Autism Brother         autistic diosrder   • Depression Other        Meds/Allergies   all current active meds have been reviewed, current meds:   Current Facility-Administered Medications   Medication Dose Route Frequency   • acetaminophen (TYLENOL) tablet 975 mg  975 mg Oral Q8H PRN   • albuterol (PROVENTIL HFA,VENTOLIN HFA) inhaler 2 puff  2 puff Inhalation Q4H PRN   • [START ON 2/19/2023] atorvastatin (LIPITOR) tablet 20 mg  20 mg Oral Daily   • carvedilol (COREG) tablet 25 mg  25 mg Oral BID With Meals   • chlorhexidine (PERIDEX) 0 12 % oral rinse 15 mL  15 mL Mouth/Throat Q12H [facility]   • heparin (porcine) subcutaneous injection 7,500 Units  7,500 Units Subcutaneous Q8H [facility]   • insulin lispro (HumaLOG) 100 units/mL subcutaneous injection 2-12 Units  2-12 Units Subcutaneous TID AC   • insulin lispro (HumaLOG) 100 units/mL subcutaneous injection 2-12 Units  2-12 Units Subcutaneous HS   • [START ON 2/19/2023] spironolactone (ALDACTONE) tablet 50 mg  50 mg Oral Daily    and PTA meds:   Prior to Admission Medications   Prescriptions Last Dose Informant Patient Reported? Taking?    Albuterol (PROVENTIL IN)   Yes No   Sig: Inhale   Empagliflozin-metFORMIN HCl (Synjardy) 12 5-500 MG TABS   Yes No   Sig: Take by mouth 2 (two) times a day   Semaglutide (OZEMPIC, 0 25 OR 0 5 MG/DOSE, SC)   Yes No   Sig: Inject 0 25 mg under the skin every 7 days   acetaminophen (TYLENOL) 325 mg tablet   No No   Sig: Take 3 tablets (975 mg total) by mouth every 8 (eight) hours as needed for mild pain   atenolol (TENORMIN) 25 mg tablet Not Taking  No No   Sig: Take 1 tablet (25 mg total) by mouth daily   Patient not taking: Reported on 2/18/2023   atorvastatin (LIPITOR) 20 mg tablet   Yes No   Sig: Take 20 mg by mouth daily   carvedilol (COREG) 25 mg tablet   Yes No   Sig: Take 25 mg by mouth 2 (two) times a day with meals   enalapril (VASOTEC) 5 mg tablet   No No   Sig: Take 1 tablet (5 mg total) by mouth 2 (two) times a day   enoxaparin (LOVENOX) 60 mg/0 6 mL   No No   Sig: Inject 0 6 mL (60 mg total) under the skin every 12 (twelve) hours   isosorbide-hydrALAZINE (BIDIL) 20-37 5 MG per tablet   Yes No   Sig: Take 2 tablets by mouth 3 (three) times a day   ivabradine HCl (Corlanor) 5 MG tablet   Yes No   Sig: Take 5 mg by mouth 2 (two) times a day   oxyCODONE (ROXICODONE) 10 MG TABS Not Taking  No No   Sig: You may take 5 mg (0 5 tab) for moderate pain or 10 mg (1 tab) for severe pain, every 4 hours, as needed  Patient not taking: Reported on 2/18/2023   oxyCODONE (Roxicodone) 5 immediate release tablet Not Taking  No No   Sig: Take 1 tablet (5 mg total) by mouth every 6 (six) hours as needed for moderate pain Max Daily Amount: 20 mg   Patient not taking: Reported on 2/18/2023   sacubitril-valsartan (Entresto)  MG TABS   Yes No   Sig: Take 1 tablet by mouth 2 (two) times a day   senna-docusate sodium (SENOKOT-S) 8 6-50 mg per tablet   No No   Sig: Take 1 tablet by mouth 2 (two) times a day for 14 days Continue to take while taking Dilaudid     spironolactone (ALDACTONE) 25 mg tablet   Yes No   Sig: Take 25 mg by mouth daily   traMADol HCl 100 MG TABS Not Taking  No No   Sig: Take 100 mg by mouth 4 (four) times a day as needed (pain)   Patient not taking: Reported on 2/18/2023      Facility-Administered Medications: None          Allergies   Allergen Reactions   • Banana - Food Allergy Other (See Comments)         • Bee Venom Other (See Comments)         • Pollen Extract        Objective   Vitals: Blood pressure 130/72, pulse 98, temperature 97 5 °F (36 4 °C), temperature source Oral, resp  rate (!) 28, height 5' 10" (1 778 m), weight (!) 156 kg (343 lb), SpO2 94 %  , Body mass index is 49 22 kg/m² ,   Orthostatic Blood Pressures    Flowsheet Row Most Recent Value   Blood Pressure 130/72 filed at 02/18/2023 7957        Systolic (24MJQ), YAE:940 , Min:105 , BMI:040     Diastolic (53KUV), GDM:07, Min:60, Max:93      Intake/Output Summary (Last 24 hours) at 2/18/2023 1646  Last data filed at 2/18/2023 0448  Gross per 24 hour   Intake 0 ml   Output 500 ml   Net -500 ml       Weight (last 2 days)     Date/Time Weight    02/18/23 1313 156 (343)    02/18/23 0600 156 (343 92)          Invasive Devices     Peripheral Intravenous Line  Duration           Peripheral IV 02/17/23 Left;Ventral (anterior) Hand <1 day    Peripheral IV 02/18/23 Dorsal (posterior); Right Hand <1 day                    Physical Exam: /72   Pulse 98   Temp 97 5 °F (36 4 °C) (Oral)   Resp (!) 28   Ht 5' 10" (1 778 m)   Wt (!) 156 kg (343 lb)   SpO2 94%   BMI 49 22 kg/m²     General Appearance:    Alert, cooperative, no distress, appears stated age, BMI 49 2   Head:    Normocephalic, without obvious abnormality, atraumatic   Eyes:    PERRL, conjunctiva/corneas clear, EOM's intact, fundi     benign, both eyes        Ears:    Normal TM's and external ear canals, both ears   Nose:   Nares normal, septum midline, mucosa normal, no drainage    or sinus tenderness   Throat:   Lips, mucosa, and tongue normal; teeth and gums normal   Neck:   Supple, symmetrical, trachea midline, no adenopathy;        thyroid:  No JVD   Back:     Symmetric, no curvature, ROM normal, no CVA tenderness   Lungs:     B/L rales   Chest wall:    No tenderness or deformity   Heart:    Regular rate and rhythm, S1 and S2 normal, no murmur, rub   or gallop   Abdomen:     Soft, non-tender, bowel sounds active all four quadrants,     no masses, no organomegaly   Extremities:   Trace pitting edema B/L   Pulses:   2+ and symmetric all extremities   Skin:   Skin color, texture, turgor normal, no rashes or lesions   Lymph nodes:   Cervical, supraclavicular, and axillary nodes normal   Neurologic:   CNII-XII intact  Normal strength, sensation and reflexes       throughout           Laboratory Results:        CBC with diff:   Results from last 7 days   Lab Units 02/18/23  0509 02/17/23  2310   WBC Thousand/uL 7 72 7 72   HEMOGLOBIN g/dL 14 0 14 2   HEMATOCRIT % 46 5 45 2   MCV fL 91 89   PLATELETS Thousands/uL 273 253   MCH pg 27 5 28 0   MCHC g/dL 30 1* 31 4   RDW % 14 9 14 9   MPV fL 11 2 10 3   NRBC AUTO /100 WBCs 0 0         CMP:  Results from last 7 days   Lab Units 02/18/23  0509 02/17/23  2310   POTASSIUM mmol/L 4 4 3 6   CHLORIDE mmol/L 104 102   CO2 mmol/L 25 25   BUN mg/dL 16 18   CREATININE mg/dL 1 29 1 52*   CALCIUM mg/dL 8 9 9 1   AST U/L  --  31   ALT U/L  --  22   ALK PHOS U/L  --  83   EGFR ml/min/1 73sq m 74 61         BMP:  Results from last 7 days   Lab Units 02/18/23  0509 02/17/23  2310   POTASSIUM mmol/L 4 4 3 6   CHLORIDE mmol/L 104 102   CO2 mmol/L 25 25   BUN mg/dL 16 18   CREATININE mg/dL 1 29 1 52*   CALCIUM mg/dL 8 9 9 1       BNP:  No results for input(s): BNP in the last 72 hours  Magnesium:   Results from last 7 days   Lab Units 02/18/23  0509   MAGNESIUM mg/dL 2 6       Coags:       TSH:       Hemoglobin A1C       Lipid Profile:         Cardiac testing:   No results found for this or any previous visit  No results found for this or any previous visit  No results found for this or any previous visit  No results found for this or any previous visit  Imaging: I have personally reviewed pertinent reports  XR chest 1 view portable    Result Date: 2/18/2023  Narrative: CHEST INDICATION:   Seizure  COMPARISON:  CXR 2/25/2022 and chest CT 2/23/2022   EXAM PERFORMED/VIEWS:  XR CHEST PORTABLE FINDINGS: Severe cardiomegaly with subcutaneous defibrillator  Compromised by body habitus  Question pulmonary edema  No definite effusion  No pneumothorax  Osseous structures appear within normal limits for patient age  Impression: Given elevated BNP, probable pulmonary edema but evaluation is compromised by body habitus  Workstation performed: [id number]     XR ankle 3+ vw left    Result Date: 2/18/2023  Narrative: LEFT ANKLE INDICATION:   Z98 890: Other specified postprocedural states Z87 81: Personal history of (healed) traumatic fracture   : COMPARISON:  12/14/2022  VIEWS:  XR ANKLE 3+ VW LEFT FINDINGS: Healing distal left tibial fracture status post ORIF in stable alignment  Intact hardware without complication  No significant degenerative changes  No lytic or blastic osseous lesion  Diffuse of tissue swelling  Impression: Healing distal left tibial fracture status post ORIF in stable alignment  Intact hardware without complication  Workstation performed: [id number]     CT head without contrast    Result Date: 2/18/2023  Narrative: CT BRAIN - WITHOUT CONTRAST INDICATION:   AMS  COMPARISON:  CT of the head on February 23, 2022  TECHNIQUE:  CT examination of the brain was performed  In addition to axial images, sagittal and coronal 2D reformatted images were created and submitted for interpretation  Radiation dose length product (DLP) for this visit:  963 48 mGy-cm   This examination, like all CT scans performed in the [facility], was performed utilizing techniques to minimize radiation dose exposure, including the use of iterative  reconstruction and automated exposure control  IMAGE QUALITY:  Diagnostic  FINDINGS: PARENCHYMA:  No intracranial mass, mass effect or midline shift  No CT signs of acute infarction  No acute parenchymal hemorrhage  VENTRICLES AND EXTRA-AXIAL SPACES:  Normal for the patient's age  VISUALIZED ORBITS: Normal visualized orbits   PARANASAL SINUSES: Normal visualized paranasal sinuses  CALVARIUM AND EXTRACRANIAL SOFT TISSUES:  Redemonstrated right frontal scalp skin thickening  Impression: No acute intracranial hemorrhage, midline shift, or mass effect   Workstation performed: [id number]       EKG reviewed personally: NSR, non specific T wave inversion  Telemetry reviewed personally: NSR       Code Status: Level 1 - Full Code

## 2023-02-18 NOTE — PROGRESS NOTES
Transfer Note - Critical Care   Sachin Junior  28 y o  male MRN: [id number]  Unit/Bed#: [facility] 201-01 Encounter: [id number]    Code Status: Level 1 - Full Code  POA:    POLST:      Reason for ICU admission:   biPAP requirement     Active problems:   Principal Problem:    Syncope, seizure like activity  Active Problems:    Nonischemic cardiomyopathy (HCC)    CHF exacerbation (HCC)    Type 2 diabetes mellitus ([address] )    UBALDO (obstructive sleep apnea)    Acute hypoxemic respiratory failure (HCC)    Encephalopathy    Elevated troponin  Resolved Problems:    * No resolved hospital problems  *    * Syncope, seizure like activity  Assessment & Plan  Patient with witnessed episode of syncope/seizure-like activity while out with friends 2/17, denies hx of seizures or similar episodes  Pt nonverbal, not following commands post event, progressively more alert throughout the day 2/18  Plan:  · Cardiology consulted for AICD interrogation  · CT head negative  · Continue telemetry  · Follow-up echo    Nonischemic cardiomyopathy [facility])  Assessment & Plan  Patient with history of nonischemic cardiomyopathy (EF 20-25%)  Patient follows with cardiology at [facility] as outpatient  He is status post AICD placement November 11/22  Plan:  Follow-up repeat echo today  Continue telemetry  Cardiology consulted, appreciate recommendations  Interrogate AICD      Elevated troponin  Assessment & Plan  Patient with elevated troponin on arrival with delta of 50  Patient without chest pain, EKG with sinus rhythm, PVCs, possible LAE, T wave abnormality  Plan:  Continue to trend troponins until peak  Cardiology consulted, appreciate recommendations    Encephalopathy  Assessment & Plan  Patient with altered mental status following syncopal/seizure-like episode during which time patient was nonverbal but following commands  Patient now back to baseline mental status per mother at bedside      Plan:  CT head negative  UDS negative, no metabolic abnormalities appreciated on labs  Continue every shift neurochecks    Acute hypoxemic respiratory failure [facility])  Assessment & Plan  Patient initially requiring BiPAP on admission last night, subsequently weaned to room air  Patient does have a history of UBALDO and is noncompliant with CPAP  Recommend patient continue to use BiPAP nightly  UBALDO (obstructive sleep apnea)  Assessment & Plan  Patient with history of UBALDO, noncompliant with CPAP  Patient required BiPAP overnight  Plan:  Continue BiPAP nightly while inpatient    Type 2 diabetes mellitus [facility])  Assessment & Plan  Lab Results   Component Value Date    HGBA1C 6 6 (H) 04/09/2022       Recent Labs     02/18/23  0557 02/18/23  0725 02/18/23  1134 02/18/23  1600   POCGLU 114 111 117 102       Blood Sugar Average: Last 72 hrs:  (P) 123     Patient with history of type 2 diabetes on Synjardy and Ozempic prior to arrival     Plan:  Blood glucose currently well controlled  Continue sliding scale insulin, blood glucose checks  Adjust insulin as necessary    CHF exacerbation (HCC)  Assessment & Plan  Wt Readings from Last 3 Encounters:   02/18/23 (!) 156 kg (343 lb)   02/15/23 (!) 154 kg (340 lb)   12/14/22 (!) 154 kg (340 lb)     Pt presented with elevated , crackles and LE edema on exam, increased 02 requirment on bipap overnight  Given Lasix 80 IV in ED  CXR with pulmonary edema  Reviewed home medications with patient, however he does not seem certain on which medications he takes, nor does his mother at bedside  He does not have a readily accessible medication list  Per most recent cardiology note from 2/6/23 cardiac medications include: atorvastatin 20 mg daily, carvedilol 25 mg twice daily, Ivabradine, spironolactone 50 mg daily, torsemide 80 mg daily, Entresto 75 to 103 mg twice daily  Plan:  Received additional dose of Lasix 80 mg today  Resumed patient's carvedilol 25 mg twice daily and spironolactone    Will defer remainder of cardiac medications at this time, pending cardiology consult  Cardiology consulted, appreciated recommendations  Strict intake and output  Follow-up BMP            Consultants:   cardiology    History of Present Illness:   Jermaine Almeida Jr  is a 28 y  o  male with past medical history significant for nonischemic cardiomyopathy with ejection fraction 20 to 25% status post AICD placement November 2022, type 2 diabetes, hypertension, hyperlipidemia, UBALDO  He presented to the emergency room via EMS last evening after having a witnessed syncope/seizure event and subsequent altered mental status  Patient was out with friends when he began to slur his words subsequently fell on his knees and lost consciousness  Patient initially in ED nonverbal, but did follow commands  Cardiac work-up was initiated patient was placed on BiPAP for increased work of breathing  Pt was admitted to SD1 given AMS and BiPAP requirement  Summary of clinical course:   Pt received lasix 80 mg IV due to concern for heart failure exacerbation with chest x-ray demonstrating pulmonary edema, Rales and lower extremity edema on exam as well as elevated BNP of 902  Cardiac workup notable for telemetry with 4 beats of NSVT, elevated troponins  Initial lactate 2 8, resolved on repeat  Patient continued to be nonverbal early this morning and received Narcan without significant immediate improvement  UDS negative  Later this afternoon patient was alert, responding appropriately and on room air  Patient does not remember losing consciousness, but does remember falling  He denies any lightheadedness, dizziness, fevers, chills, chest pain or nausea prior to losing consciousness  Patient states his friends have a video of the event which his mother at bedside has seen  Patient did not share video on admission  Patient was given an additional dose of Lasix 80 IV    Cardiology was consulted for management of heart failure exacerbation and interrogation of AICD  Echo pending  Home medications were reviewed with patient, however he did seem uncertain of which medications he was taking at home  Pt was accepted to transfer to [address] by Dr Lita Coates on 2/18  Recent or scheduled procedures:   echocardiogram    Outstanding/pending diagnostics:   Echocardiogram, troponin reflex, ESR/CRP    Cultures:   n/a       Mobilization Plan:   OOB as tolerated  Nutrition Plan:   Cardiac diet, diabetic diet, Na 2g restriction    VTE Pharmacologic Prophylaxis: Heparin  VTE Mechanical Prophylaxis: sequential compression device    Discharge Plan:   Patient should be ready for discharge to home following further workup and evaluation with cardiology    Initial Physical Therapy Recommendations: PT consult not ordered  Initial Occupational Therapy Recommendations: OT consult not ordered  Initial /Plan: as needed    Home medications that are not reordered and reason why:   Reviewed home medications with patient, however he does not seem certain on which medications he takes, nor does his mother at bedside  He does not have a readily accessible medication list  Per most recent cardiology note from 2/6/23 pt is on albuterol inhaler 2 puffs every 6 hours as needed for wheezing, atorvastatin 20 mg daily, Tessalon Perles 100 mg 3 times daily as needed for cough, carvedilol 25 mg twice daily, Trulicity, Synjardy 88 4 to 500 mg twice daily, Ivabradine, spironolactone 50 mg daily, torsemide 80 mg daily, tramadol 50 mg every 6 as needed for moderate pain, Entresto 75 to 103 mg twice daily  Resumed patient's carvedilol 25 mg twice daily and spironolactone  Will defer remainder of cardiac medications at this time, pending cardiology consult  Spoke with Dr Lita Coates regarding transfer  Please TT MICU resident role with any questions or concerns  Portions of the record may have been created with voice recognition software    Occasional wrong word or "sound a like" substitutions may have occurred due to the inherent limitations of voice recognition software  Read the chart carefully and recognize, using context, where substitutions have occurred

## 2023-02-18 NOTE — ASSESSMENT & PLAN NOTE
Patient with elevated troponin on arrival with delta of 50  Patient without chest pain, EKG with sinus rhythm, PVCs, possible LAE, T wave abnormality       Plan:  Continue to trend troponins until peak  Cardiology consulted, appreciate recommendations

## 2023-02-18 NOTE — ASSESSMENT & PLAN NOTE
Patient with history of UBALDO, noncompliant with CPAP  Patient required BiPAP overnight      Plan:  Continue BiPAP nightly while inpatient

## 2023-02-18 NOTE — ASSESSMENT & PLAN NOTE
Wt Readings from Last 3 Encounters:   02/18/23 (!) 156 kg (343 lb)   02/15/23 (!) 154 kg (340 lb)   12/14/22 (!) 154 kg (340 lb)     Pt presented with elevated , crackles and LE edema on exam, increased 02 requirment on bipap overnight  Given Lasix 80 IV in ED  CXR with pulmonary edema  Reviewed home medications with patient, however he does not seem certain on which medications he takes, nor does his mother at bedside  He does not have a readily accessible medication list  Per most recent cardiology note from 2/6/23 cardiac medications include: atorvastatin 20 mg daily, carvedilol 25 mg twice daily, Ivabradine, spironolactone 50 mg daily, torsemide 80 mg daily, Entresto 75 to 103 mg twice daily  Plan:  Continue diuresis  Resumed patient's carvedilol 25 mg twice daily and spironolactone     Cardiology consulted, appreciated recommendations  Strict intake and output  Follow-up BMP

## 2023-02-18 NOTE — PROGRESS NOTES
28year old man with NICM EF 20-25% with AICD, CKD, DM, UBALDO intermittently using CPAP presented after syncope and seizure like activity while at work with friends  Per family had been feeling weak and confused  ED evaluation with concerns for heart failure and placed on BiPAP, given lasix and nebulized bronchodilators  Trial of LD narcan without significant improvements    Unclear of events yesterday but per reports confused and slurring at times, he denied illicit substances, denied AICD firing, no chest pain, denied increased LE edema    VS AF, HR 80's, MAPS 70-80's, SpO2 98% on RA, I/Os -500cc  Exam  GEN obese young man, in bed, awake, oriented x 3 nontoxic, conversant  HEENT ATNC< MMM, no nystagmus  NECK no accessory muscle use, unable to determine JVD  CV reg, single s1/2, no m/r  Pulm mild bibasilar rales, no wheeze, no rhonchi  ABD obese +BS soft NTND, no rebound  EXT trace LE edema, warm, brisk cap refill      Laboratory and Diagnostics  Results from last 7 days   Lab Units 02/18/23  0509 02/17/23  2310   WBC Thousand/uL 7 72 7 72   HEMOGLOBIN g/dL 14 0 14 2   HEMATOCRIT % 46 5 45 2   PLATELETS Thousands/uL 273 253   NEUTROS PCT % 67 64   MONOS PCT % 9 9     Results from last 7 days   Lab Units 02/18/23  0509 02/17/23  2310   SODIUM mmol/L 137 134*   POTASSIUM mmol/L 4 4 3 6   CHLORIDE mmol/L 104 102   CO2 mmol/L 25 25   ANION GAP mmol/L 8 7   BUN mg/dL 16 18   CREATININE mg/dL 1 29 1 52*   CALCIUM mg/dL 8 9 9 1   GLUCOSE RANDOM mg/dL 120 187*   ALT U/L  --  22   AST U/L  --  31   ALK PHOS U/L  --  83   ALBUMIN g/dL  --  2 9*   TOTAL BILIRUBIN mg/dL  --  0 48     Results from last 7 days   Lab Units 02/18/23  0509   MAGNESIUM mg/dL 2 6   PHOSPHORUS mg/dL 4 5               Results from last 7 days   Lab Units 02/18/23  0240 02/17/23  2310   LACTIC ACID mmol/L 1 6 2 8*     UDS negative    MICRO  FLU/RSV/COVID neg    RADIOGRAPHS - images personally reviewed  CT head - no acute mass/bleed or shift    CXR - cardiomegaly, poor penetration, suggestion of vascular congestion vs hypoventilation with under penetration, S-ICD in place    Tele 4bt NSVT    Assessment  1  Acute hypoxic respiratory failure  2  Syncope, seizure like activity - no further activity and improving mental status  3  Enceopalopathy  4  NICM EF 20-25%  5  CKD  6  Morbid obesity  7  Elevated troponin  8  DM2  9   UBALDO on CPAP    Plan  · NEURO - continue serial neuro exams, monitor off AED therapy for now, delirium precautions  · CARDIAC - follow up repeat TTE, consult CHF cardiology, interrogate AICD, give lasix 80mg x 1, obtain current med list with likely plan to resume BB  · PULM - weaned to RA, Plan BiPAP qhs, resume home CPAP at d/c  · GI - ADAT  · RENAL - diuresis as above, K >4, Mag >2  · ID - monitor off abx  · ENDO - ISS  · HEME - no active issues   · ICU Care - SCDs, UFH TID  · Stable for downgrade Level II SDU status with SLIM    Tino Thelma, DO

## 2023-02-18 NOTE — RESPIRATORY THERAPY NOTE
02/18/23 0716   Respiratory Assessment   Assessment Type Assess only   General Appearance Sleeping   Respiratory Pattern Normal   Chest Assessment Chest expansion symmetrical   Bilateral Breath Sounds Diminished   Cough None   Resp Comments Pt found resting quietly on bipap  minimally interactive  Dr arrived at bedside  ABG ordered at bedside unsuccessful, plan to change to nc, and obtain VBG  After transition to 4 lpm nc pt showed some increased WOB   BiPAP remains on stand by in room   O2 Device v60   Oxygen Therapy/Pulse Ox   O2 Device Nasal cannula   Nasal Cannula O2 Flow Rate (L/min) 4 L/min   Calculated FIO2 (%) - Nasal Cannula 36   SpO2 Activity At Rest   $ Pulse Oximetry Spot Check Charge Completed

## 2023-02-18 NOTE — ED PROVIDER NOTES
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 in place, DM2, HTN, HLD, UBALDO, who presents to the ED today, via EMS, due to loss of consciousness  Patient unable to provide subjective history due to nonverbal status  Per EMS, patient has a history of seizure and he seized while at work this evening; however, on review of the medical record, patient has no history of seizure listed  Patient's mother arrived later and stated that he has had an arrest before  She states that he has had no history of seizure  She states that he was out with his friends this evening when he suddenly collapsed without warning  Over the last few weeks, he has been generally weak and fatigued  No associated fever  Prior to Admission Medications   Prescriptions Last Dose Informant Patient Reported? Taking?    Albuterol (PROVENTIL IN)   Yes No   Sig: Inhale   Empagliflozin-metFORMIN HCl (Synjardy) 12 5-500 MG TABS   Yes No   Sig: Take by mouth 2 (two) times a day   Semaglutide (OZEMPIC, 0 25 OR 0 5 MG/DOSE, SC)   Yes No   Sig: Inject 0 25 mg under the skin every 7 days   acetaminophen (TYLENOL) 325 mg tablet   No No   Sig: Take 3 tablets (975 mg total) by mouth every 8 (eight) hours as needed for mild pain   atenolol (TENORMIN) 25 mg tablet   No No   Sig: Take 1 tablet (25 mg total) by mouth daily   atorvastatin (LIPITOR) 20 mg tablet   Yes No   Sig: Take 20 mg by mouth daily   carvedilol (COREG) 25 mg tablet   Yes No   Sig: Take 25 mg by mouth 2 (two) times a day with meals   enalapril (VASOTEC) 5 mg tablet   No No   Sig: Take 1 tablet (5 mg total) by mouth 2 (two) times a day   enoxaparin (LOVENOX) 60 mg/0 6 mL   No No   Sig: Inject 0 6 mL (60 mg total) under the skin every 12 (twelve) hours isosorbide-hydrALAZINE (BIDIL) 20-37 5 MG per tablet   Yes No   Sig: Take 2 tablets by mouth 3 (three) times a day   ivabradine HCl (Corlanor) 5 MG tablet   Yes No   Sig: Take 5 mg by mouth 2 (two) times a day   oxyCODONE (ROXICODONE) 10 MG TABS   No No   Sig: You may take 5 mg (0 5 tab) for moderate pain or 10 mg (1 tab) for severe pain, every 4 hours, as needed  oxyCODONE (Roxicodone) 5 immediate release tablet   No No   Sig: Take 1 tablet (5 mg total) by mouth every 6 (six) hours as needed for moderate pain Max Daily Amount: 20 mg   sacubitril-valsartan (Entresto)  MG TABS   Yes No   Sig: Take 1 tablet by mouth 2 (two) times a day   senna-docusate sodium (SENOKOT-S) 8 6-50 mg per tablet   No No   Sig: Take 1 tablet by mouth 2 (two) times a day for 14 days Continue to take while taking Dilaudid     spironolactone (ALDACTONE) 25 mg tablet   Yes No   Sig: Take 25 mg by mouth daily   traMADol HCl 100 MG TABS   No No   Sig: Take 100 mg by mouth 4 (four) times a day as needed (pain)      Facility-Administered Medications: None       Past Medical History:   Diagnosis Date   • Enchondroma of hand bone     last assessed: 4/21/2015   • Heart trouble    • Hypertension    • Palpitations        Past Surgical History:   Procedure Laterality Date   • EXTERNAL FIXATOR APPLICATION Left 0/01/0300    Procedure: APPLICATION EXTERNAL FIXATION DEVICE LOWER EXTREMITY;  Surgeon: Nadiya Peguero MD;  Location: [facility];  Service: Orthopedics   • NO PAST SURGERIES     • ORIF TIBIA FRACTURE Left 3/3/2022    Procedure: OPEN REDUCTION W/ INTERNAL FIXATION (ORIF) LEFT TIBIA PILON FRACTURE, REMOVAL OF EXTERNAL FIXATOR;  Surgeon: Nadiya Peguero MD;  Location: [facility];  Service: Orthopedics       Family History   Problem Relation Age of Onset   • No Known Problems Mother    • No Known Problems Father    • Autism Brother         autistic diosrder   • Diabetes type II Paternal Grandmother         mellitus   • Autism Brother autistic diosrder   • Depression Other      I have reviewed and agree with the history as documented  E-Cigarette/Vaping   • E-Cigarette Use Never User      E-Cigarette/Vaping Substances   • Nicotine No    • THC No    • CBD No    • Flavoring No    • Other No    • Unknown No      Social History     Tobacco Use   • Smoking status: Never   • Smokeless tobacco: Never   Vaping Use   • Vaping Use: Never used   Substance Use Topics   • Alcohol use: No   • Drug use: No        Review of Systems   Unable to perform ROS: Patient nonverbal   Neurological: Positive for syncope  Seizures: Per EMS  Physical Exam  ED Triage Vitals   Temperature Pulse Respirations Blood Pressure SpO2   02/18/23 0154 02/17/23 2230 02/17/23 2230 02/17/23 2243 02/17/23 2230   98 1 °F (36 7 °C) (!) 115 22 161/85 98 %      Temp Source Heart Rate Source Patient Position - Orthostatic VS BP Location FiO2 (%)   02/18/23 0154 02/17/23 2230 -- -- --   Oral Monitor         Pain Score       --                    Orthostatic Vital Signs  Vitals:    02/17/23 2230 02/17/23 2243   BP:  161/85   Pulse: (!) 115        Physical Exam  Vitals reviewed  Constitutional:       General: He is in acute distress  Appearance: He is obese  He is diaphoretic  HENT:      Head: Normocephalic and atraumatic  Right Ear: External ear normal       Left Ear: External ear normal       Nose: Nose normal       Mouth/Throat:      Mouth: Mucous membranes are moist       Pharynx: Oropharynx is clear  Eyes:      General: No scleral icterus  Right eye: No discharge  Left eye: No discharge  Extraocular Movements: Extraocular movements intact  Conjunctiva/sclera: Conjunctivae normal       Pupils: Pupils are equal, round, and reactive to light  Cardiovascular:      Rate and Rhythm: Tachycardia present  Pulses: Normal pulses  Heart sounds: Normal heart sounds  No murmur heard  No friction rub  No gallop     Pulmonary: Breath sounds: Wheezing (Left) present  Abdominal:      General: Abdomen is flat  There is no distension  Palpations: Abdomen is soft  Tenderness: There is no abdominal tenderness  There is no right CVA tenderness, left CVA tenderness, guarding or rebound  Comments: No apparent discomfort with palpation of the abdomen   Musculoskeletal:      Cervical back: No tenderness  Right lower leg: Edema present  Left lower leg: Edema present  Lymphadenopathy:      Cervical: No cervical adenopathy  Skin:     General: Skin is warm  Capillary Refill: Capillary refill takes less than 2 seconds  Neurological:      Mental Status: He is alert  Comments: EOMI  Patient moving all 4 extremities equally and to command  Patient following commands  Tongue midline  Patient nonverbal   Psychiatric:      Comments: Unable to assess secondary to mental status         ED Medications  Medications   ipratropium-albuterol (FOR EMS ONLY) (DUO-NEB) 0 5-2 5 mg/3 mL inhalation solution 3 mL (0 mL Does not apply Given to EMS 2/17/23 2349)   albuterol (FOR EMS ONLY) (2 5 mg/3 mL) 0 083 % inhalation solution 2 5 mg (0 mg Does not apply Given to EMS 2/17/23 2349)   furosemide (LASIX) injection 40 mg (40 mg Intravenous Given 2/17/23 2317)   furosemide (LASIX) injection 40 mg (40 mg Intravenous Given 2/17/23 2317)       Diagnostic Studies  Results Reviewed     Procedure Component Value Units Date/Time    COVID/FLU/RSV [[id number]]  (Normal) Collected: 02/18/23 0045    Lab Status: Final result Specimen: Nares from Nose Updated: 02/18/23 0139     SARS-CoV-2 Negative     INFLUENZA A PCR Negative     INFLUENZA B PCR Negative     RSV PCR Negative    Narrative:      FOR PEDIATRIC PATIENTS - copy/paste COVID Guidelines URL to browser: [email or web address]    SARS-CoV-2 assay is a Nucleic Acid Amplification assay intended for the  qualitative detection of nucleic acid from SARS-CoV-2 in nasopharyngeal  swabs  Results are for the presumptive identification of SARS-CoV-2 RNA  Positive results are indicative of infection with SARS-CoV-2, the virus  causing COVID-19, but do not rule out bacterial infection or co-infection  with other viruses  Laboratories within the [facility] and its  territories are required to report all positive results to the appropriate  public health authorities  Negative results do not preclude SARS-CoV-2  infection and should not be used as the sole basis for treatment or other  patient management decisions  Negative results must be combined with  clinical observations, patient history, and epidemiological information  This test has not been FDA cleared or approved  This test has been authorized by FDA under an Emergency Use Authorization  (EUA)  This test is only authorized for the duration of time the  declaration that circumstances exist justifying the authorization of the  emergency use of an in vitro diagnostic tests for detection of SARS-CoV-2  virus and/or diagnosis of COVID-19 infection under section 564(b)(1) of  the Act, 21 U  S C  475UFS-6(R)(6), unless the authorization is terminated  or revoked sooner  The test has been validated but independent review by FDA  and CLIA is pending  Test performed using [employer] GeneXpert: This RT-PCR assay targets N2,  a region unique to SARS-CoV-2  A conserved region in the E-gene was chosen  for pan-Sarbecovirus detection which includes SARS-CoV-2  According to CMS-2020-01-R, this platform meets the definition of high-throughput technology      Rapid drug screen, urine [[id number]]  (Normal) Collected: 02/18/23 0034    Lab Status: Final result Specimen: Urine, Clean Catch Updated: 02/18/23 0133     Amph/Meth UR Negative     Barbiturate Ur Negative     Benzodiazepine Urine Negative     Cocaine Urine Negative     Methadone Urine Negative     Opiate Urine Negative     PCP Ur Negative     THC Urine Negative     Oxycodone Urine Negative    Narrative:      FOR MEDICAL PURPOSES ONLY  IF CONFIRMATION NEEDED PLEASE CONTACT THE LAB WITHIN 5 DAYS  Drug Screen Cutoff Levels:  AMPHETAMINE/METHAMPHETAMINES  1000 ng/mL  BARBITURATES     200 ng/mL  BENZODIAZEPINES     200 ng/mL  COCAINE      300 ng/mL  METHADONE      300 ng/mL  OPIATES      300 ng/mL  PHENCYCLIDINE     25 ng/mL  THC       50 ng/mL  OXYCODONE      741 ng/mL    Salicylate level [[id number]]  (Abnormal) Collected: 02/18/23 0046    Lab Status: Final result Specimen: Blood from Arm, Left Updated: 89/22/44 0442     Salicylate Lvl <3 mg/dL     Acetaminophen level-If concentration is detectable, please discuss with medical  on call   [[id number]]  (Abnormal) Collected: 02/18/23 0046    Lab Status: Final result Specimen: Blood from Arm, Left Updated: 02/18/23 0132     Acetaminophen Level <2 ug/mL     HS Troponin I 2hr [[id number]]  (Abnormal) Collected: 02/18/23 0046    Lab Status: Final result Specimen: Blood from Arm, Left Updated: 02/18/23 0128     hs TnI 2hr 141 ng/L      Delta 2hr hsTnI 30 ng/L     Ethanol [[id number]]  (Normal) Collected: 02/18/23 0046    Lab Status: Final result Specimen: Blood from Arm, Left Updated: 02/18/23 0118     Ethanol Lvl <3 mg/dL     Comprehensive metabolic panel [[id number]]  (Abnormal) Collected: 02/17/23 2310    Lab Status: Final result Specimen: Blood from Arm, Left Updated: 02/18/23 0019     Sodium 134 mmol/L      Potassium 3 6 mmol/L      Chloride 102 mmol/L      CO2 25 mmol/L      ANION GAP 7 mmol/L      BUN 18 mg/dL      Creatinine 1 52 mg/dL      Glucose 187 mg/dL      Calcium 9 1 mg/dL      Corrected Calcium 10 0 mg/dL      AST 31 U/L      ALT 22 U/L      Alkaline Phosphatase 83 U/L      Total Protein 7 7 g/dL      Albumin 2 9 g/dL      Total Bilirubin 0 48 mg/dL      eGFR 61 ml/min/1 73sq m     Narrative:      [place] guidelines for Chronic Kidney Disease (CKD):   •  Stage 1 with normal or high GFR (GFR > 90 mL/min/1 73 square meters)  •  Stage 2 Mild CKD (GFR = 60-89 mL/min/1 73 square meters)  •  Stage 3A Moderate CKD (GFR = 45-59 mL/min/1 73 square meters)  •  Stage 3B Moderate CKD (GFR = 30-44 mL/min/1 73 square meters)  •  Stage 4 Severe CKD (GFR = 15-29 mL/min/1 73 square meters)  •  Stage 5 End Stage CKD (GFR <15 mL/min/1 73 square meters)  Note: GFR calculation is accurate only with a steady state creatinine    NT-BNP PRO-BE,[facility] only           [[id number]]  (Abnormal) Collected: 02/17/23 2310    Lab Status: Final result Specimen: Blood from Arm, Left Updated: 02/18/23 0019     NT-proBNP 903 pg/mL     Lactic acid, plasma [[id number]]  (Abnormal) Collected: 02/17/23 2310    Lab Status: Final result Specimen: Blood from Arm, Left Updated: 02/18/23 0007     LACTIC ACID 2 8 mmol/L     Narrative:      Result may be elevated if tourniquet was used during collection      Lactic acid 2 Hours [[id number]]     Lab Status: No result Specimen: Blood     HS Troponin I 4hr [[id number]]     Lab Status: No result Specimen: Blood     HS Troponin 0hr (reflex protocol) [[id number]]  (Abnormal) Collected: 02/17/23 2310    Lab Status: Final result Specimen: Blood from Arm, Left Updated: 02/17/23 2354     hs TnI 0hr 111 ng/L     CBC and differential [[id number]] Collected: 02/17/23 2310    Lab Status: Final result Specimen: Blood from Arm, Left Updated: 02/17/23 2320     WBC 7 72 Thousand/uL      RBC 5 07 Million/uL      Hemoglobin 14 2 g/dL      Hematocrit 45 2 %      MCV 89 fL      MCH 28 0 pg      MCHC 31 4 g/dL      RDW 14 9 %      MPV 10 3 fL      Platelets 940 Thousands/uL      nRBC 0 /100 WBCs      Neutrophils Relative 64 %      Immat GRANS % 0 %      Lymphocytes Relative 23 %      Monocytes Relative 9 %      Eosinophils Relative 3 %      Basophils Relative 1 %      Neutrophils Absolute 4 96 Thousands/µL      Immature Grans Absolute 0 03 Thousand/uL      Lymphocytes Absolute 1 80 Thousands/µL Monocytes Absolute 0 70 Thousand/µL      Eosinophils Absolute 0 19 Thousand/µL      Basophils Absolute 0 04 Thousands/µL     Blood gas, venous [[id number]]  (Abnormal) Collected: 02/17/23 2310    Lab Status: Final result Specimen: Blood from Arm, Left Updated: 02/17/23 2320     pH, Zay 7 389     pCO2, Zay 49 1 mm Hg      pO2, Zay 197 8 mm Hg      HCO3, Zay 29 0 mmol/L      Base Excess, Zay 3 0 mmol/L      O2 Content, Zay 22 8 ml/dL      O2 HGB, VENOUS 97 9 %     Fingerstick Glucose (POCT) [[id number]]  (Abnormal) Collected: 02/17/23 2229    Lab Status: Final result Updated: 02/17/23 2230     POC Glucose 171 mg/dl                  XR chest 1 view portable   ED Interpretation by Danilo Lawrence MD (02/17 2251)   Cardiomegaly, pulmonary edema      CT head without contrast    (Results Pending)         Procedures  Procedures      ED Course  ED Course as of 02/18/23 0203   Fri Feb 17, 2023   2300 ECG per my independent interpretation: Normal rate, regular rhythm, no ectopy, normal axis, no ST elevations or depressions     Sat Feb 18, 2023   0001 Per patient's mother, patient's friends have a video of the patient passing out  They are unwilling to send the video for unknown reasons  Concern for cardiac event vs tox  Coma panel ordered   0040 Patient having improvement in symptoms at this time  Heart rate decreasing  Patient is becoming more responsive per his mother and nursing  9598 Patient's mother discussed further with his friends and he had some speech difficulty before collapsing  Patient is full code per his mother   12 ACETAMINOPHEN LEVEL(!): <2  Normal    2160 SALICYLATE LEVEL(!): <3   0135 Delta 2hr hsTnI(!): 30  High   0142 Creatinine(!): 1 52  High   0143 Patient remains alert and is moving all four extremities to command quickly when prompted   0203 Patient's mother states that patient continues to improve    Work-up has been discussed with patient and his mother Medical Decision Making  Patient is a 69-year-old male, with a history significant for 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%) ICD in place, DM2, HTN, HLD, UBALDO, who presents to the ED today, via EMS, due to loss of consciousness  Patient unable to provide subjective history due to nonverbal status  Per EMS, patient has a history of seizure and he seized while at work this evening; however, on review of the medical record, patient has no history of seizure listed  Patient's mother arrived later and stated that he has had an arrest before  She states that he has had no history of seizure  She states that he was out with his friends this evening when he suddenly collapsed without warning  Over the last few weeks, he has been generally weak and fatigued  On exam, patient has wheezing heard primarily in the left lung field and bilateral pitting edema, increased work of breathing, nonverbal on exam but following commands  This presentation is concerning for: Arrhythmia, arrest, acute CHF exacerbation, ACS  I also considered ICH, viral syndrome  Very low suspicion for seizure at this time based on history and physical exam   Investigate with cardiac work-up, BNP, VBG, CT head, pacemaker interrogation  Will manage with Lasix, BiPAP, further based upon work-up/response     - No language barrier    - History obtained from EMS report, patient's mother    - History limited by acute encephalopathy  - External record review performed of previous charting was performed by me - EF 20-25% and ICD placement per [facility] documentation    - I independently reviewed and interpreted ordered labs, ECGs, and imaging from this encounter   - Patient's medication regimen reviewed  - Patient's comorbidities factored into diagnosis considerations and disposition planning    - I discussed the patient's need for admission with Dr Olamide Armijo       Acute encephalopathy: acute illness or injury  Acute exacerbation of CHF (congestive heart failure) ([place] [address] ): acute illness or injury  DAVID (acute kidney injury) [facility]): acute illness or injury  Elevated blood pressure reading: acute illness or injury  Elevated lactic acid level: acute illness or injury  Syncope: acute illness or injury  Amount and/or Complexity of Data Reviewed  Labs: ordered  Decision-making details documented in ED Course  Radiology: ordered and independent interpretation performed  Risk  Prescription drug management  Decision regarding hospitalization  Disposition  Final diagnoses:   DAVID (acute kidney injury) ([place] [address] )   Elevated blood pressure reading   Syncope   Acute exacerbation of CHF (congestive heart failure) ([facility])   Elevated lactic acid level   Acute encephalopathy     Time reflects when diagnosis was documented in both MDM as applicable and the Disposition within this note     Time User Action Codes Description Comment    2/18/2023 12:28 AM Black Elba A Add [N17 9] DAVID (acute kidney injury) ([place] [address] )     2/18/2023 12:28 AM Black Elba A Add [R03 0] Elevated blood pressure reading     2/18/2023 12:28 AM Black Elba A Add [R55] Syncope     2/18/2023 12:56 AM Charo Delgado Pun A Add [I50 9] Acute exacerbation of CHF (congestive heart failure) ([place] [address] )     2/18/2023  1:27 AM Black Elba A Add [R79 89] Elevated lactic acid level     2/18/2023  1:41 AM Charo Everett Pun A Add [G93 40] Acute encephalopathy     2/18/2023  1:43 AM Black Elba A Modify [N17 9] DAVID (acute kidney injury) ([place] [address] )     2/18/2023  1:43 AM Black Elba A Modify [I50 9] Acute exacerbation of CHF (congestive heart failure) [facility])       ED Disposition     ED Disposition   Admit    Condition   Stable    Date/Time   Sat Feb 18, 2023  1:55 AM    Comment   Case was discussed with CC and the patient's admission status was agreed to be Admission Status: inpatient status to the service of Dr Surya Chowdhury              Follow-up Information    None         Patient's Medications   Discharge Prescriptions    No medications on file     No discharge procedures on file  PDMP Review       Value Time User    PDMP Reviewed  Yes 5/13/2022  2:49 PM Ronda Page PA-C           ED Provider  Attending physically available and evaluated Martinez Hopkins    MEHNAZ managed the patient along with the ED Attending      Electronically Signed by         Pamela Levine MD  02/18/23 6843

## 2023-02-18 NOTE — ASSESSMENT & PLAN NOTE
Lab Results   Component Value Date    HGBA1C 6 6 (H) 04/09/2022       Recent Labs     02/18/23  0557 02/18/23  0725 02/18/23  1134 02/18/23  1600   POCGLU 114 111 117 102       Blood Sugar Average: Last 72 hrs:  (P) 123     Patient with history of type 2 diabetes on Synjardy and Ozempic prior to arrival     Plan:  Blood glucose currently well controlled  Continue sliding scale insulin, blood glucose checks  Adjust insulin as necessary

## 2023-02-18 NOTE — ASSESSMENT & PLAN NOTE
Patient initially requiring BiPAP on admission last night, subsequently weaned to room air  Patient does have a history of UBALDO and is noncompliant with CPAP  Recommend patient continue to use BiPAP nightly

## 2023-02-18 NOTE — ASSESSMENT & PLAN NOTE
Patient with witnessed episode of syncope/seizure-like activity while out with friends 2/17, denies hx of seizures or similar episodes  Pt nonverbal, not following commands post event, progressively more alert throughout the day 2/18  Plan:  · Cardiology consulted for AICD interrogation and echo    · Preliminary echo finding of LV thrombus discussed with Dr Priya Clarke  · Possible CVA from LV thrombus causing his initial presentation  · CT head negative - will check MRI  · Continue telemetry  · Neurology consultation requested  · Monitor neuro checks

## 2023-02-18 NOTE — ASSESSMENT & PLAN NOTE
Patient with history of nonischemic cardiomyopathy (EF 20-25%)  Patient follows with cardiology at [facility] as outpatient  He is status post AICD placement November 11/22      Plan:  Echo with preliminary finding of LV thrombus, will start IV heparin  Continue telemetry  Cardiology consulted, appreciate recommendations  Interrogate AICD

## 2023-02-18 NOTE — H&P
H&P Exam - Critical Care   Mark Rangel  28 y o  male MRN: [id number]  Unit/Bed#: [facility] 201-01 Encounter: [id number]      -------------------------------------------------------------------------------------------------------------  Chief Complaint: Syncope vs Seizure    History of Present Illness   HX and PE limited by: Nonverbal  Makr Rangel  is a 28 y o  male who presented to the ED for loss of consciousness  History is not obtainable from the patient as he is nonverbal  Per EMS, patient has history of seizures and had seizure like activity while at work  His mom who later arrived to the ED states that he does not have any history of seizures and that he suddenly collapsed without warning  States that he has been feeling weak and fatigued over the past two weeks but mother is unable to provide much more history  History obtained from chart review and unobtainable from patient due to mental status   -------------------------------------------------------------------------------------------------------------  Assessment and Plan:    Neuro:   • Diagnosis: Encephalopathy  o Plan:   o Unclear cause  o Tox screen negative  o Frequent neuro checks  o CT head 2/18: negative   o Consider LP, MRI if does not improve  • Diagnosis: Syncope  o Plan:   o Concern for cardiogenic syncope given patient's history of cardiomyopathy   o ECHO  o Monitor on telemetry  • Diagnosis:   o Plan:   o CAM ICU  o Regulate sleep wake  o Frequent reorientation      CV:   • Diagnosis: Nonischemic dilated cardiomyopathy  o Plan:   o ECHO 4/27/2022: EF 20-25%, severe global hypokinesis  o Repeat ECHO  o Monitor on telemetry   o Consider restarting home carvedilol 25 mg BID, entresto  mg BID, spironolactone 50 mg QD     • Diagnosis: Elevated troponin  o Plan:   o Likely chronic in setting of cardiomyopathy  o Consult cardiology  • Diagnosis: HF exacerbation  o Plan:   o Given IV lasix 80 mg in ED   o Wean BiPAP as tolerated      Pulm:  • Diagnosis: HF exacerbation  o Plan:   o See plan under CV       GI:   • Diagnosis: No acute issues  o Plan:   o NPO while on BIPAP      :   • Diagnosis: CKD  o Plan:   o Trend BUN/ CR  o Strict I/Os      F/E/N:   • Plan:   • Fluids: n/a  • Electrolytes: Replete PRN  • Nutrition: NPO while on BiPAP      Heme/Onc:   • Diagnosis: No acute issues  o Plan:   o Trend blood counts      Endo:   • Diagnosis: T2DM  o Plan:   o SSI  o Adjust SSI as needed      ID:   • Diagnosis: No acute issues  o Plan:   o Trend WBC, fever curve        MSK/Skin:   • Diagnosis:   o Plan:   o Frequent repositioning  o PT/OT when appropriate        Disposition: Admit to Stepdown Level 1  Code Status: Level 1 - Full Code  --------------------------------------------------------------------------------------------------------------  Review of Systems    Review of systems was unable to be performed secondary to non-verbal    Physical Exam  Vitals and nursing note reviewed  Constitutional:       General: He is not in acute distress  Appearance: He is obese  He is ill-appearing  He is not toxic-appearing  HENT:      Head: Normocephalic and atraumatic  Right Ear: External ear normal       Left Ear: External ear normal       Nose: Nose normal       Mouth/Throat:      Mouth: Mucous membranes are moist       Pharynx: Oropharynx is clear  Eyes:      Extraocular Movements: Extraocular movements intact  Pupils: Pupils are equal, round, and reactive to light  Cardiovascular:      Rate and Rhythm: Normal rate and regular rhythm  Pulses: Normal pulses  Heart sounds: Normal heart sounds  Pulmonary:      Breath sounds: Rales (bilateral rales) present  Comments: On bipap  Abdominal:      Palpations: Abdomen is soft  Tenderness: There is no abdominal tenderness  There is no guarding or rebound  Musculoskeletal:         General: Normal range of motion  Right lower leg: Edema (1+) present  Left lower leg: Edema (1+) present  Skin:     General: Skin is warm  Neurological:      Mental Status: He is alert  GCS: GCS eye subscore is 4  GCS verbal subscore is 1  GCS motor subscore is 6  Comments: No facial droop  Follows commands but is nonverbal  Moves all four extremities spontaneously  --------------------------------------------------------------------------------------------------------------  Vitals:   Vitals:    02/17/23 2257 02/18/23 0154 02/18/23 0315 02/18/23 0335   BP:       Pulse:       Resp:       Temp:  98 1 °F (36 7 °C)     TempSrc:  Oral  Axillary   SpO2: 100%  100%      Temp  Min: 98 1 °F (36 7 °C)  Max: 98 1 °F (36 7 °C)        There is no height or weight on file to calculate BMI    N/A    Laboratory and Diagnostics:  Results from last 7 days   Lab Units 02/17/23  2310   WBC Thousand/uL 7 72   HEMOGLOBIN g/dL 14 2   HEMATOCRIT % 45 2   PLATELETS Thousands/uL 253   NEUTROS PCT % 64   MONOS PCT % 9     Results from last 7 days   Lab Units 02/17/23  2310   SODIUM mmol/L 134*   POTASSIUM mmol/L 3 6   CHLORIDE mmol/L 102   CO2 mmol/L 25   ANION GAP mmol/L 7   BUN mg/dL 18   CREATININE mg/dL 1 52*   CALCIUM mg/dL 9 1   GLUCOSE RANDOM mg/dL 187*   ALT U/L 22   AST U/L 31   ALK PHOS U/L 83   ALBUMIN g/dL 2 9*   TOTAL BILIRUBIN mg/dL 0 48                   Results from last 7 days   Lab Units 02/18/23  0240 02/17/23  2310   LACTIC ACID mmol/L 1 6 2 8*     ABG:    VBG:  Results from last 7 days   Lab Units 02/17/23  2310   PH JAVI  7 389   PCO2 JAVI mm Hg 49 1   PO2 JAVI mm Hg 197 8*   HCO3 JAVI mmol/L 29 0   BASE EXC JAVI mmol/L 3 0           Micro:        EKG: Procedure Note: EKG  Date/Time: 02/18/23 4:04 AM   Interpreted by: Judah Steinberg DO  Indications / Diagnosis: Syncope  ECG reviewed by me, the ED Physician: yes   The EKG demonstrates:  Rhythm: normal sinus with first degree AV block and PVCs  Intervals: normal intervals  Axis: normal axis  QRS/Blocks: normal QRS  ST Changes: No acute ST Changes, no STD/KRISTIN  Imaging: I have personally reviewed pertinent reports     and I have personally reviewed pertinent films in PACS    Historical Information   Past Medical History:   Diagnosis Date   • Enchondroma of hand bone     last assessed: 4/21/2015   • Heart trouble    • Hypertension    • Palpitations      Past Surgical History:   Procedure Laterality Date   • EXTERNAL FIXATOR APPLICATION Left 2/59/9507    Procedure: APPLICATION EXTERNAL FIXATION DEVICE LOWER EXTREMITY;  Surgeon: Adam Ballard MD;  Location: [facility];  Service: Orthopedics   • NO PAST SURGERIES     • ORIF TIBIA FRACTURE Left 3/3/2022    Procedure: OPEN REDUCTION W/ INTERNAL FIXATION (ORIF) LEFT TIBIA PILON FRACTURE, REMOVAL OF EXTERNAL FIXATOR;  Surgeon: Adam Ballard MD;  Location: [facility];  Service: Orthopedics     Social History   Social History     Substance and Sexual Activity   Alcohol Use No     Social History     Substance and Sexual Activity   Drug Use No     Social History     Tobacco Use   Smoking Status Never   Smokeless Tobacco Never     Exercise History: Unknown  Family History:   Family History   Problem Relation Age of Onset   • No Known Problems Mother    • No Known Problems Father    • Autism Brother         autistic diosrder   • Diabetes type II Paternal Grandmother         mellitus   • Autism Brother         autistic diosrder   • Depression Other      I have reviewed this patient's family history and commented on sigificant items within the HPI      Medications:  Current Facility-Administered Medications   Medication Dose Route Frequency   • chlorhexidine (PERIDEX) 0 12 % oral rinse 15 mL  15 mL Mouth/Throat Q12H [address]   • heparin (porcine) subcutaneous injection 7,500 Units  7,500 Units Subcutaneous Q8H [address]   • insulin lispro (HumaLOG) 100 units/mL subcutaneous injection 2-12 Units  2-12 Units Subcutaneous TID AC   • insulin lispro (HumaLOG) 100 units/mL subcutaneous injection 2-12 Units  2-12 Units Subcutaneous HS     Home medications:  Prior to Admission Medications   Prescriptions Last Dose Informant Patient Reported? Taking? Albuterol (PROVENTIL IN)   Yes No   Sig: Inhale   Empagliflozin-metFORMIN HCl (Synjardy) 12 5-500 MG TABS   Yes No   Sig: Take by mouth 2 (two) times a day   Semaglutide (OZEMPIC, 0 25 OR 0 5 MG/DOSE, SC)   Yes No   Sig: Inject 0 25 mg under the skin every 7 days   acetaminophen (TYLENOL) 325 mg tablet   No No   Sig: Take 3 tablets (975 mg total) by mouth every 8 (eight) hours as needed for mild pain   atenolol (TENORMIN) 25 mg tablet   No No   Sig: Take 1 tablet (25 mg total) by mouth daily   atorvastatin (LIPITOR) 20 mg tablet   Yes No   Sig: Take 20 mg by mouth daily   carvedilol (COREG) 25 mg tablet   Yes No   Sig: Take 25 mg by mouth 2 (two) times a day with meals   enalapril (VASOTEC) 5 mg tablet   No No   Sig: Take 1 tablet (5 mg total) by mouth 2 (two) times a day   enoxaparin (LOVENOX) 60 mg/0 6 mL   No No   Sig: Inject 0 6 mL (60 mg total) under the skin every 12 (twelve) hours   isosorbide-hydrALAZINE (BIDIL) 20-37 5 MG per tablet   Yes No   Sig: Take 2 tablets by mouth 3 (three) times a day   ivabradine HCl (Corlanor) 5 MG tablet   Yes No   Sig: Take 5 mg by mouth 2 (two) times a day   oxyCODONE (ROXICODONE) 10 MG TABS   No No   Sig: You may take 5 mg (0 5 tab) for moderate pain or 10 mg (1 tab) for severe pain, every 4 hours, as needed  oxyCODONE (Roxicodone) 5 immediate release tablet   No No   Sig: Take 1 tablet (5 mg total) by mouth every 6 (six) hours as needed for moderate pain Max Daily Amount: 20 mg   sacubitril-valsartan (Entresto)  MG TABS   Yes No   Sig: Take 1 tablet by mouth 2 (two) times a day   senna-docusate sodium (SENOKOT-S) 8 6-50 mg per tablet   No No   Sig: Take 1 tablet by mouth 2 (two) times a day for 14 days Continue to take while taking Dilaudid     spironolactone (ALDACTONE) 25 mg tablet   Yes No   Sig: Take 25 mg by mouth daily   traMADol HCl 100 MG TABS   No No   Sig: Take 100 mg by mouth 4 (four) times a day as needed (pain)      Facility-Administered Medications: None     Allergies: Allergies   Allergen Reactions   • Banana - Food Allergy Other (See Comments)         • Bee Venom Other (See Comments)         • Pollen Extract      ------------------------------------------------------------------------------------------------------------  Advance Directive and Living Will:      Power of :    POLST:    ------------------------------------------------------------------------------------------------------------  Anticipated Length of Stay is > 2 midnights    Care Time Delivered:   Upon my evaluation, this patient had a high probability of imminent or life-threatening deterioration due to respiratory distress, which required my direct attention, intervention, and personal management  Leno Kumar DO        Portions of the record may have been created with voice recognition software  Occasional wrong word or "sound a like" substitutions may have occurred due to the inherent limitations of voice recognition software    Read the chart carefully and recognize, using context, where substitutions have occurred

## 2023-02-19 ENCOUNTER — APPOINTMENT (INPATIENT)
Dept: RADIOLOGY | Facility: HOSPITAL | Age: 28
End: 2023-02-19

## 2023-02-19 PROBLEM — I63.9 CVA (CEREBRAL VASCULAR ACCIDENT) (HCC): Status: ACTIVE | Noted: 2023-02-18

## 2023-02-19 LAB
ALBUMIN SERPL BCP-MCNC: 2.9 G/DL (ref 3.5–5)
ALP SERPL-CCNC: 67 U/L (ref 46–116)
ALT SERPL W P-5'-P-CCNC: 21 U/L (ref 12–78)
ANION GAP SERPL CALCULATED.3IONS-SCNC: 5 MMOL/L (ref 4–13)
AORTIC ROOT: 3.4 CM
AORTIC VALVE MEAN VELOCITY: 7 M/S
APICAL FOUR CHAMBER EJECTION FRACTION: 28 %
APTT PPP: 26 SECONDS (ref 23–37)
APTT PPP: 58 SECONDS (ref 23–37)
ARTERIAL PATENCY WRIST A: YES
ASCENDING AORTA: 3.2 CM
AST SERPL W P-5'-P-CCNC: 44 U/L (ref 5–45)
AV LVOT MEAN GRADIENT: 1 MMHG
AV LVOT PEAK GRADIENT: 1 MMHG
AV MEAN GRADIENT: 2 MMHG
AV PEAK GRADIENT: 4 MMHG
AV VELOCITY RATIO: 0.5
BASE EX.OXY STD BLDV CALC-SCNC: 91.1 % (ref 60–80)
BASE EXCESS BLDV CALC-SCNC: 5.9 MMOL/L
BASOPHILS # BLD AUTO: 0.04 THOUSANDS/ÂΜL (ref 0–0.1)
BASOPHILS NFR BLD AUTO: 1 % (ref 0–1)
BILIRUB DIRECT SERPL-MCNC: 0.23 MG/DL (ref 0–0.2)
BILIRUB SERPL-MCNC: 0.63 MG/DL (ref 0.2–1)
BUN SERPL-MCNC: 15 MG/DL (ref 5–25)
CALCIUM SERPL-MCNC: 9.4 MG/DL (ref 8.3–10.1)
CHLORIDE SERPL-SCNC: 104 MMOL/L (ref 96–108)
CO2 SERPL-SCNC: 27 MMOL/L (ref 21–32)
CREAT SERPL-MCNC: 1.28 MG/DL (ref 0.6–1.3)
DOP CALC AO PEAK VEL: 0.96 M/S
DOP CALC AO VTI: 13.87 CM
DOP CALC LVOT PEAK VEL VTI: 8.65 CM
DOP CALC LVOT PEAK VEL: 0.48 M/S
DOP CALC MV VTI: 15.74 CM
E WAVE DECELERATION TIME: 103 MS
EOSINOPHIL # BLD AUTO: 0.23 THOUSAND/ÂΜL (ref 0–0.61)
EOSINOPHIL NFR BLD AUTO: 4 % (ref 0–6)
ERYTHROCYTE [DISTWIDTH] IN BLOOD BY AUTOMATED COUNT: 15.1 % (ref 11.6–15.1)
FRACTIONAL SHORTENING: 9 (ref 28–44)
GFR SERPL CREATININE-BSD FRML MDRD: 75 ML/MIN/1.73SQ M
GLUCOSE SERPL-MCNC: 114 MG/DL (ref 65–140)
GLUCOSE SERPL-MCNC: 119 MG/DL (ref 65–140)
GLUCOSE SERPL-MCNC: 141 MG/DL (ref 65–140)
GLUCOSE SERPL-MCNC: 149 MG/DL (ref 65–140)
GLUCOSE SERPL-MCNC: 155 MG/DL (ref 65–140)
HCO3 BLDV-SCNC: 31.3 MMOL/L (ref 24–30)
HCT VFR BLD AUTO: 46.7 % (ref 36.5–49.3)
HGB BLD-MCNC: 13.7 G/DL (ref 12–17)
IMM GRANULOCYTES # BLD AUTO: 0.02 THOUSAND/UL (ref 0–0.2)
IMM GRANULOCYTES NFR BLD AUTO: 0 % (ref 0–2)
INR PPP: 0.99 (ref 0.84–1.19)
INTERVENTRICULAR SEPTUM IN DIASTOLE (PARASTERNAL SHORT AXIS VIEW): 0.9 CM
INTERVENTRICULAR SEPTUM: 0.9 CM (ref 0.6–1.1)
LAAS-AP2: 31.8 CM2
LAAS-AP4: 29.6 CM2
LEFT ATRIUM SIZE: 4.3 CM
LEFT INTERNAL DIMENSION IN SYSTOLE: 7 CM (ref 2.1–4)
LEFT VENTRICLE DIASTOLIC VOLUME (MOD BIPLANE): 415 ML
LEFT VENTRICLE SYSTOLIC VOLUME (MOD BIPLANE): 309 ML
LEFT VENTRICULAR INTERNAL DIMENSION IN DIASTOLE: 7.7 CM (ref 3.5–6)
LEFT VENTRICULAR POSTERIOR WALL IN END DIASTOLE: 1.6 CM
LEFT VENTRICULAR STROKE VOLUME: 63 ML
LIPASE SERPL-CCNC: 172 U/L (ref 73–393)
LV EF: 26 %
LVSV (TEICH): 63 ML
LYMPHOCYTES # BLD AUTO: 2.03 THOUSANDS/ÂΜL (ref 0.6–4.47)
LYMPHOCYTES NFR BLD AUTO: 31 % (ref 14–44)
MAGNESIUM SERPL-MCNC: 2.8 MG/DL (ref 1.6–2.6)
MCH RBC QN AUTO: 27 PG (ref 26.8–34.3)
MCHC RBC AUTO-ENTMCNC: 29.3 G/DL (ref 31.4–37.4)
MCV RBC AUTO: 92 FL (ref 82–98)
MONOCYTES # BLD AUTO: 0.73 THOUSAND/ÂΜL (ref 0.17–1.22)
MONOCYTES NFR BLD AUTO: 11 % (ref 4–12)
MV E'TISSUE VEL-SEP: 5 CM/S
MV MEAN GRADIENT: 2 MMHG
MV PEAK A VEL: 0.57 M/S
MV PEAK E VEL: 88 CM/S
MV PEAK GRADIENT: 5 MMHG
MV STENOSIS PRESSURE HALF TIME: 30 MS
MV VALVE AREA P 1/2 METHOD: 7.33
NASAL CANNULA: 4
NEUTROPHILS # BLD AUTO: 3.42 THOUSANDS/ÂΜL (ref 1.85–7.62)
NEUTS SEG NFR BLD AUTO: 53 % (ref 43–75)
NRBC BLD AUTO-RTO: 0 /100 WBCS
O2 CT BLDV-SCNC: 19.1 ML/DL
PCO2 BLDV: 47.9 MM HG (ref 42–50)
PH BLDV: 7.43 [PH] (ref 7.3–7.4)
PHOSPHATE SERPL-MCNC: 3.5 MG/DL (ref 2.7–4.5)
PLATELET # BLD AUTO: 267 THOUSANDS/UL (ref 149–390)
PMV BLD AUTO: 10.6 FL (ref 8.9–12.7)
PO2 BLDV: 67.5 MM HG (ref 35–45)
POTASSIUM SERPL-SCNC: 3.9 MMOL/L (ref 3.5–5.3)
PROT SERPL-MCNC: 7.9 G/DL (ref 6.4–8.4)
PROTHROMBIN TIME: 13.3 SECONDS (ref 11.6–14.5)
PULMONARY REGURGITATION LATE DIASTOLIC VELOCITY: 0.02 M/S
RBC # BLD AUTO: 5.07 MILLION/UL (ref 3.88–5.62)
RIGHT ATRIUM AREA SYSTOLE A4C: 25.6 CM2
RIGHT VENTRICLE ID DIMENSION: 6 CM
SL CV LEFT ATRIUM LENGTH A2C: 6.4 CM
SL CV LV EF: 20
SL CV PED ECHO LEFT VENTRICLE DIASTOLIC VOLUME (MOD BIPLANE) 2D: 316 ML
SL CV PED ECHO LEFT VENTRICLE SYSTOLIC VOLUME (MOD BIPLANE) 2D: 253 ML
SODIUM SERPL-SCNC: 136 MMOL/L (ref 135–147)
TR MAX PG: 45 MMHG
TR PEAK VELOCITY: 3.4 M/S
TRICUSPID ANNULAR PLANE SYSTOLIC EXCURSION: 1.8 CM
TRICUSPID VALVE PEAK REGURGITATION VELOCITY: 3.35 M/S
WBC # BLD AUTO: 6.47 THOUSAND/UL (ref 4.31–10.16)

## 2023-02-19 RX ORDER — HEPARIN SODIUM 10000 [USP'U]/100ML
3-24 INJECTION, SOLUTION INTRAVENOUS
Status: DISCONTINUED | OUTPATIENT
Start: 2023-02-19 | End: 2023-02-21 | Stop reason: HOSPADM

## 2023-02-19 RX ORDER — ONDANSETRON 2 MG/ML
4 INJECTION INTRAMUSCULAR; INTRAVENOUS EVERY 6 HOURS PRN
Status: DISCONTINUED | OUTPATIENT
Start: 2023-02-19 | End: 2023-02-21 | Stop reason: HOSPADM

## 2023-02-19 RX ORDER — GUAIFENESIN/DEXTROMETHORPHAN 100-10MG/5
10 SYRUP ORAL EVERY 4 HOURS PRN
Status: DISCONTINUED | OUTPATIENT
Start: 2023-02-19 | End: 2023-02-20

## 2023-02-19 RX ADMIN — CARVEDILOL 25 MG: 25 TABLET, FILM COATED ORAL at 08:27

## 2023-02-19 RX ADMIN — HEPARIN SODIUM 7500 UNITS: 5000 INJECTION INTRAVENOUS; SUBCUTANEOUS at 06:44

## 2023-02-19 RX ADMIN — FUROSEMIDE 80 MG: 10 INJECTION, SOLUTION INTRAVENOUS at 15:11

## 2023-02-19 RX ADMIN — SACUBITRIL AND VALSARTAN 1 TABLET: 24; 26 TABLET, FILM COATED ORAL at 13:03

## 2023-02-19 RX ADMIN — SPIRONOLACTONE 50 MG: 50 TABLET ORAL at 08:31

## 2023-02-19 RX ADMIN — INSULIN LISPRO 2 UNITS: 100 INJECTION, SOLUTION INTRAVENOUS; SUBCUTANEOUS at 15:53

## 2023-02-19 RX ADMIN — CHLORHEXIDINE GLUCONATE 0.12% ORAL RINSE 15 ML: 1.2 LIQUID ORAL at 08:31

## 2023-02-19 RX ADMIN — ATORVASTATIN CALCIUM 20 MG: 20 TABLET, FILM COATED ORAL at 08:27

## 2023-02-19 RX ADMIN — FUROSEMIDE 80 MG: 10 INJECTION, SOLUTION INTRAVENOUS at 08:28

## 2023-02-19 RX ADMIN — SACUBITRIL AND VALSARTAN 1 TABLET: 24; 26 TABLET, FILM COATED ORAL at 22:24

## 2023-02-19 RX ADMIN — HEPARIN SODIUM 15 UNITS/KG/HR: 10000 INJECTION, SOLUTION INTRAVENOUS at 15:05

## 2023-02-19 RX ADMIN — CARVEDILOL 25 MG: 25 TABLET, FILM COATED ORAL at 15:53

## 2023-02-19 NOTE — ASSESSMENT & PLAN NOTE
Wt Readings from Last 3 Encounters:   02/19/23 (!) 156 kg (343 lb 0 6 oz)   02/15/23 (!) 154 kg (340 lb)   12/14/22 (!) 154 kg (340 lb)     -management via primary team, heart failure service  -on lasix, Entresto, coreg, spironolactone

## 2023-02-19 NOTE — ASSESSMENT & PLAN NOTE
-2D echo as mentioned above, started on heparin drip  -AICD interrogation, no events/shocks  -heart failure service following with management

## 2023-02-19 NOTE — PROGRESS NOTES
[address] [facility]  Progress Note - Yuliet Fernandes  1995, 28 y o  male MRN: [id number]  Unit/Bed#: [facility] 201-01 Encounter: [id number]  Primary Care Provider: Chato Almodovar MD   Date and time admitted to hospital: 2/17/2023 10:21 PM    * Syncope, seizure like activity  Assessment & Plan  Patient with witnessed episode of syncope/seizure-like activity while out with friends 2/17, denies hx of seizures or similar episodes  Pt nonverbal, not following commands post event, progressively more alert throughout the day 2/18  Plan:  · Cardiology consulted for AICD interrogation and echo  · Preliminary echo finding of LV thrombus discussed with Dr Andrew Coley  · Possible CVA from LV thrombus causing his initial presentation  · CT head negative - will check MRI  · Continue telemetry  · Neurology consultation requested  · Monitor neuro checks    Elevated troponin  Assessment & Plan  Patient with elevated troponin on arrival with delta of 50  Patient without chest pain, EKG with sinus rhythm, PVCs, possible LAE, T wave abnormality  Plan:  Continue to trend troponins until peak  Cardiology consulted, appreciate recommendations    Encephalopathy  Assessment & Plan  Patient with altered mental status following syncopal/seizure-like episode during which time patient was nonverbal but following commands  Patient now back to baseline mental status per mother at bedside  Plan:  CT head negative  UDS negative, no metabolic abnormalities appreciated on labs  Check B12, TSH, ammonia, and syphilits screen  Check MRI brain given the findings of LV thrombus    CHF exacerbation [facility])  Assessment & Plan  Wt Readings from Last 3 Encounters:   02/18/23 (!) 156 kg (343 lb)   02/15/23 (!) 154 kg (340 lb)   12/14/22 (!) 154 kg (340 lb)     Pt presented with elevated , crackles and LE edema on exam, increased 02 requirment on bipap overnight  Given Lasix 80 IV in ED   CXR with pulmonary edema  Reviewed home medications with patient, however he does not seem certain on which medications he takes, nor does his mother at bedside  He does not have a readily accessible medication list  Per most recent cardiology note from 2/6/23 cardiac medications include: atorvastatin 20 mg daily, carvedilol 25 mg twice daily, Ivabradine, spironolactone 50 mg daily, torsemide 80 mg daily, Entresto 75 to 103 mg twice daily  Plan:  Continue diuresis  Resumed patient's carvedilol 25 mg twice daily and spironolactone  Cardiology consulted, appreciated recommendations  Strict intake and output  Follow-up BMP        Nonischemic cardiomyopathy [facility])  Assessment & Plan  Patient with history of nonischemic cardiomyopathy (EF 20-25%)  Patient follows with cardiology at [facility] as outpatient  He is status post AICD placement November 11/22  Plan:  Echo with preliminary finding of LV thrombus, will start IV heparin  Continue telemetry  Cardiology consulted, appreciate recommendations  Interrogate AICD      Acute hypoxemic respiratory failure [facility])  Assessment & Plan  Patient initially requiring BiPAP on admission last night, subsequently weaned to room air  Patient does have a history of UBALDO and is noncompliant with CPAP  Recommend patient continue to use BiPAP nightly  UBALDO (obstructive sleep apnea)  Assessment & Plan  Patient with history of UBALDO, noncompliant with CPAP  Patient required BiPAP overnight      Plan:  Continue BiPAP nightly while inpatient    Type 2 diabetes mellitus [facility])  Assessment & Plan  Lab Results   Component Value Date    HGBA1C 6 6 (H) 04/09/2022       Recent Labs     02/18/23  0557 02/18/23  0725 02/18/23  1134 02/18/23  1600   POCGLU 114 111 117 102       Blood Sugar Average: Last 72 hrs:  (P) 123     Patient with history of type 2 diabetes on Synjardy and Ozempic prior to arrival     Plan:  Blood glucose currently well controlled  Continue sliding scale insulin, blood glucose checks  Adjust insulin as necessary          VTE Pharmacologic Prophylaxis: VTE Score: 4 Moderate Risk (Score 3-4) - Pharmacological DVT Prophylaxis Ordered: heparin drip  Patient Centered Rounds: I performed bedside rounds with nursing staff today  Discussions with Specialists or Other Care Team Provider: nurse, CM, cardiology, neurology    Education and Discussions with Family / Patient: Updated  (wife) via phone  Total Time Spent on Date of Encounter in care of patient: 35 minutes This time was spent on one or more of the following: performing physical exam; counseling and coordination of care; obtaining or reviewing history; documenting in the medical record; reviewing/ordering tests, medications or procedures; communicating with other healthcare professionals and discussing with patient's family/caregivers  Current Length of Stay: 1 day(s)  Current Patient Status: Inpatient   Certification Statement: The patient will continue to require additional inpatient hospital stay due to AMS  Discharge Plan: Anticipate discharge in >72 hrs to home  Code Status: Level 1 - Full Code    Subjective:   Reports nausea and vomiting earlier with LLQ pain  Reports that his has resolved now  Objective:     Vitals:   Temp (24hrs), Av 7 °F (36 5 °C), Min:97 5 °F (36 4 °C), Max:98 °F (36 7 °C)    Temp:  [97 5 °F (36 4 °C)-98 °F (36 7 °C)] 97 6 °F (36 4 °C)  HR:  [80-98] 90  Resp:  [22-24] 24  BP: ()/(54-74) 127/60  SpO2:  [78 %-99 %] 94 %  Body mass index is 49 22 kg/m²  Input and Output Summary (last 24 hours): Intake/Output Summary (Last 24 hours) at 2023 1235  Last data filed at 2023 1120  Gross per 24 hour   Intake 1870 ml   Output 3925 ml   Net -2055 ml       Physical Exam:   Physical Exam  Constitutional:       General: He is not in acute distress  Appearance: He is obese  He is not toxic-appearing  HENT:      Head: Normocephalic and atraumatic        Nose: Nose normal    Eyes:      Extraocular Movements: Extraocular movements intact  Cardiovascular:      Rate and Rhythm: Normal rate and regular rhythm  Pulmonary:      Effort: Pulmonary effort is normal       Breath sounds: No wheezing or rales  Abdominal:      General: There is no distension  Palpations: Abdomen is soft  Tenderness: There is no abdominal tenderness  Neurological:      Mental Status: He is alert  Cranial Nerves: No cranial nerve deficit  Comments: Slow vocal responses, possible expressive aphasia   Psychiatric:         Mood and Affect: Mood normal          Behavior: Behavior normal           Additional Data:     Labs:  Results from last 7 days   Lab Units 02/19/23  0702   WBC Thousand/uL 6 47   HEMOGLOBIN g/dL 13 7   HEMATOCRIT % 46 7   PLATELETS Thousands/uL 267   NEUTROS PCT % 53   LYMPHS PCT % 31   MONOS PCT % 11   EOS PCT % 4     Results from last 7 days   Lab Units 02/19/23  0659 02/18/23  0509 02/17/23  2310   SODIUM mmol/L 136   < > 134*   POTASSIUM mmol/L 3 9   < > 3 6   CHLORIDE mmol/L 104   < > 102   CO2 mmol/L 27   < > 25   BUN mg/dL 15   < > 18   CREATININE mg/dL 1 28   < > 1 52*   ANION GAP mmol/L 5   < > 7   CALCIUM mg/dL 9 4   < > 9 1   ALBUMIN g/dL  --   --  2 9*   TOTAL BILIRUBIN mg/dL  --   --  0 48   ALK PHOS U/L  --   --  83   ALT U/L  --   --  22   AST U/L  --   --  31   GLUCOSE RANDOM mg/dL 114   < > 187*    < > = values in this interval not displayed           Results from last 7 days   Lab Units 02/19/23  1104 02/19/23  0642 02/18/23  2126 02/18/23  1600 02/18/23  1134 02/18/23  0725 02/18/23  0557 02/17/23  2229   POC GLUCOSE mg/dl 141* 119 131 102 117 111 114 171*         Results from last 7 days   Lab Units 02/18/23  0240 02/17/23  2310   LACTIC ACID mmol/L 1 6 2 8*       Lines/Drains:  Invasive Devices     Peripheral Intravenous Line  Duration           Peripheral IV 02/17/23 Left;Ventral (anterior) Hand 1 day    Peripheral IV 02/18/23 Dorsal (posterior); Right Hand 1 day                  Telemetry:  Telemetry Orders (From admission, onward)             48 Hour Telemetry Monitoring  Continuous x 48 hours        References:    Telemetry Guidelines   Question:  Reason for 48 Hour Telemetry  Answer:  Acute Decompensated CHF (continuous diuretic infusion or total diuretic dose > 200 mg daily, associated electrolyte derangement, ionotropic drip, history of ventricular arrhythmia, or new EF <35%)                 Telemetry Reviewed: Normal Sinus Rhythm  Indication for Continued Telemetry Use: No indication for continued use  Will discontinue  Imaging: Reviewed radiology reports from this admission including: CT head    Recent Cultures (last 7 days):         Last 24 Hours Medication List:   Current Facility-Administered Medications   Medication Dose Route Frequency Provider Last Rate   • acetaminophen  975 mg Oral Q8H PRN Dona Haque MD     • albuterol  2 puff Inhalation Q4H PRN Dona Haque MD     • atorvastatin  20 mg Oral Daily Dona Haque MD     • carvedilol  25 mg Oral BID With Meals Dona Haque MD     • furosemide  80 mg Intravenous BID (diuretic) Rossi Day MD     • heparin (porcine)  7,500 Units Subcutaneous [facility] Dona Haque MD     • insulin lispro  2-12 Units Subcutaneous TID [facility] Dona Haque MD     • insulin lispro  2-12 Units Subcutaneous HS Dona Haque MD     • ondansetron  4 mg Intravenous Q6H PRN Darrell Schultz MD     • sacubitril-valsartan  1 tablet Oral BID Filemon Parker MD     • spironolactone  50 mg Oral Daily Dona Haque MD          Today, Patient Was Seen By: Lm Hernandez MD    **Please Note: This note may have been constructed using a voice recognition system  **

## 2023-02-19 NOTE — PROGRESS NOTES
Pt not tolerating bipap after 3 attempts to keep it on tonight  Currently on 2L of O2 via nasal cannula  RR in mid 25s   Oxygen saturations 97-99%

## 2023-02-19 NOTE — ED ATTENDING ATTESTATION
2/17/2023  I, Booker Wei MD, saw and evaluated the patient  I have discussed the patient with the resident/non-physician practitioner and agree with the resident's/non-physician practitioner's findings, Plan of Care, and MDM as documented in the resident's/non-physician practitioner's note, except where noted  All available labs and Radiology studies were reviewed  I was present for key portions of any procedure(s) performed by the resident/non-physician practitioner and I was immediately available to provide assistance  At this point I agree with the current assessment done in the Emergency Department  I have conducted an independent evaluation of this patient a history and physical is as follows: This is a 18-year-old male with history of cardiomyopathy, ICD, diabetes, hypertension, hyperlipidemia with prior cardiac arrest   Patient was with friends when he had a syncopal event  Patient presents to the emergency department nonverbal and unable to provide any history  Per EMS, the patient has a history of seizure, but patient's family reports that he does not have a history of seizure  It is unknown whether patient was using any substances  His friends are not available for history  On initial evaluation, patient is awake and interactive  He is following commands  He is adequately perfused  The patient has increased work of breathing with a respiratory rate of 30  The patient does not answer any questions and does not phonate  He does not have facial swelling or oral swelling  His uvula is midline  The patient's trachea is midline  His lungs are slightly diminished at the bases, but seems to be symmetric  He has no signs of trauma to the head, neck, chest, or abdomen  His abdomen is soft and nontender  He has no lateralizing peripheral edema  Medical decision making: Patient here with altered mental status, syncopal event in the setting of cardiomyopathy    We will do cardiac evaluation  Additionally, patient is very tachypneic with increased work of breathing  Will place on positive pressure ventilation  Reassessment: Patient with improved work of breathing on positive pressure ventilation, patient's family at bedside and further history gathered regarding patient's prior cardiac history  Patient's EKG without acute ischemia here  AICD interrogated, no discharges    We will plan to admit the patient to the hospital with diagnosis of 1 respiratory failure, 2, syncope in the setting of 3 cardiomyopathy, AICD ED Course         Critical Care Time  CriticalCare Time  Performed by: Mo Lugo MD  Authorized by: Mo Lugo MD     Critical care provider statement:     Critical care time (minutes):  33    Critical care time was exclusive of:  Separately billable procedures and treating other patients and teaching time    Critical care was necessary to treat or prevent imminent or life-threatening deterioration of the following conditions:  Respiratory failure    Critical care was time spent personally by me on the following activities:  Blood draw for specimens, obtaining history from patient or surrogate, development of treatment plan with patient or surrogate, discussions with consultants, discussions with primary provider, evaluation of patient's response to treatment, examination of patient, review of old charts, re-evaluation of patient's condition, ordering and review of radiographic studies, ordering and review of laboratory studies and ordering and performing treatments and interventions

## 2023-02-19 NOTE — ASSESSMENT & PLAN NOTE
Lab Results   Component Value Date    HGBA1C 6 6 (H) 04/09/2022       Recent Labs     02/18/23  1600 02/18/23  2126 02/19/23  0642 02/19/23  1104   POCGLU 102 131 119 141*       Blood Sugar Average: Last 72 hrs:  (P) 125 75     -euglycemic goals, management per primary team

## 2023-02-19 NOTE — ASSESSMENT & PLAN NOTE
79-year-old male with history of known nonischemic cardiomyopathy with ICD placement, diabetes, hypertension, hyperlipidemia, obstructive sleep apnea  He presented on 2/17 to the ED following episode of loss of consciousnes witnessed by his friends (involved foaming at the mouth, unclear if definitive seizure activity was witnessed)  Following the event he was awake but with verbal responses (which progressively improved throughout the day 2/18)  2/19: Neurology consulted for questionable speech difficulty with dysarthria; his 2D echocardiogram did reveal evidence of left ventricular thrombus  CT head was repeated and revealed an interim left hemisphere infarct in the left corpus stratum/caudate head, putamen, internal capsule  The patient to undergo formal stroke workup, likely in the setting of LV thrombus and advanced cardiomyopathy  Will also rule out epileptiform abnormalities as patient's mother notes several weeks of increased lethargy  Mother also notes of interest he was recently started on Ozempic by his primary care doctor over the past week or two for weight loss, this was discontinued    Plan:  -routine EEG, pending, no need for AED at this time  -Repeat CT head completed 2/19, results as mentioned above with the left hemisphere infarct noted  -Will check repeat CT head today, 2/20 to ensure no interim hemorrhagic conversion of infarct while being on heparin (will check CTA head/neck at that time as well to evaluate vasculature)  -MRI brain unable to have completed with ICD per record review  -2D echo yesterday with EF 15 to 20%, left ventricular atypical thrombus noted    -Was started on heparin infusion given the LV thrombus concern   -PTT monitoring (defer specific PTT goal to heart failure service give the LV thrombus; currently is on stroke protocol with PTT goal 50-70), APTT 58 L  -continue statin  -hemoglobin A1C pending  -lipid panel - LDL 78  -neuro checks  -telemetry monitoring  -provide stroke education  -therapy evaluations  -serum labwork pending:   -TSH, ammonia - wnl   - B12 269 L, medical team aware (started on B12 supplemenation  -heart failure service following, appreciate input   -CHF exacerbation, given lasix

## 2023-02-19 NOTE — CONSULTS
Consultation - Neurology   Yumiko Gonzales  28 y o  male MRN: [id number]  Unit/Bed#: [facility] 201-01 Encounter: [id number]      Assessment/Plan   CVA (cerebral vascular accident) [facility])  Assessment & Plan  69-year-old male with history of known nonischemic cardiomyopathy with ICD placement, diabetes, hypertension, hyperlipidemia, obstructive sleep apnea  He presented on 2/17 to the ED following episode of loss of consciousnes witnessed by his friends (involved foaming at the mouth, unclear if definitive seizure activity was witnessed)  Following the event he was awake but with verbal responses (which progressively improved throughout the day yesterday 2/18)  2/19: Neurology consulted for questionable speech difficulty with dysarthria; his 2D echocardiogram did reveal evidence of left ventricular thrombus  CT head was repeated today and revealed an interim left hemisphere infarct in the left corpus stratum/caudate head, putamen, internal capsule  To undergo formal stroke workup, likely in the setting of LV thrombus and advanced cardiomyopathy  Will also rule out epileptiform abnormalities as patient's mother notes several weeks of increased lethargy  Mother also notes of interest he was recently started on Ozempic by his primary care doctor over the past week or two for weight loss  Plan:  -will check routine EEG  -Initiate stroke work-up:  -Repeat CT head performed today, results as mentioned above with the left hemisphere infarct noted  -Will check repeat CT head tomorrow morning 2/20 to ensure no interim hemorrhagic conversion of infarct while being on heparin (will check CTA head/neck at that time as well to evaluate vasculature)  -MRI brain pending (unclear if ICD is MRI compatible)  -2D echo yesterday with EF 15 to 20%, left ventricular atypical thrombus noted    -Was started on heparin infusion given the LV thrombus concern   -PTT monitoring (defer specific PTT goal to heart failure service give the LV thrombus; currently is on stroke protocol with PTT goal 50-70  -continue statin  -check hemoglobin A1C and lipid panel  -neuro checks  -telemetry monitoring  -provide stroke education  -therapy evaluations  -serum labwork pending:   -B12, folate, TSH, ammonia    Nonischemic cardiomyopathy (HCC)  Assessment & Plan  -2D echo as mentioned above, started on heparin drip  -AICD interrogation pending  -heart failure service following with management    CHF exacerbation ([facility] 75 )  Assessment & Plan  Wt Readings from Last 3 Encounters:   02/19/23 (!) 156 kg (343 lb 0 6 oz)   02/15/23 (!) 154 kg (340 lb)   12/14/22 (!) 154 kg (340 lb)     -management via primary team, heart failure service  -on lasix, Entresto, coreg, spironolactone    Acute hypoxemic respiratory failure ([facility] )  Assessment & Plan  -initially on BIPAP; now improved    Type 2 diabetes mellitus ([facility] )  Assessment & Plan  Lab Results   Component Value Date    HGBA1C 6 6 (H) 04/09/2022       Recent Labs     02/18/23  1600 02/18/23  2126 02/19/23  0642 02/19/23  1104   POCGLU 102 131 119 141*       Blood Sugar Average: Last 72 hrs:  (P) 125 75     -euglycemic goals, management per primary team    Discussed plan of care with attending neurologist      Recommendations for outpatient neurological follow up have yet to be determined  History of Present Illness     Reason for Consult / Principal Problem: Stroke concern, speech disturbance, initial LOC/syncope prior to admission    HPI: Elyse Bone  is a 28 y o  male with history as mentioned above in assessment who neurology is asked to evaluate in regards to the above  Patient was brought to the ED on 2/17 after he reportedly had a syncopal episode with loss of consciousness while out with his friends  On his initial presentation to the ED he was awake and alert, interactive and following all commands    His initial exam however noted tachypnea tachycardia/ with lack of verbal responses to questions patient was admitted to the ICU for close cardiac management given his history  See above, Echo with LV thrombus concern, started on heparin  Heart failure service following with CHF exacerbation concern  Was weaned off of initial BIPAP    Per review of critical care team notes yesterday, patient was improving as far as mental status goes and answering questions/progressively more alert  Patient's mother also voiced to ED provider as well as critical care team that patient has been generally weak and fatigued over the past several weeks  No prior history of seizure, stroke, no recent infectious symptoms per patient/family  No unilateral motor/sensory deficits, no headache nor vision changes  Inpatient consult to Neurology  Consult performed by: Sherrie Laughlin PA-C  Consult ordered by: Hina Patel MD          Review of Systems   Constitutional: Positive for fatigue  Negative for activity change, appetite change, chills, diaphoresis, fever and unexpected weight change  Respiratory: Positive for shortness of breath  Cardiovascular: Negative  Gastrointestinal: Negative  Musculoskeletal: Negative  Skin: Negative  Neurological: Positive for speech difficulty and weakness (Generalized)  Negative for dizziness, tremors, seizures, syncope, facial asymmetry, light-headedness, numbness and headaches  All other ROS reviewed and negative       Historical Information   Past Medical History:   Diagnosis Date   • Enchondroma of hand bone     last assessed: 4/21/2015   • Heart trouble    • Hypertension    • Palpitations      Past Surgical History:   Procedure Laterality Date   • EXTERNAL FIXATOR APPLICATION Left 0/67/5416    Procedure: APPLICATION EXTERNAL FIXATION DEVICE LOWER EXTREMITY;  Surgeon: Nadiya Peguero MD;  Location: [facility];  Service: Orthopedics   • NO PAST SURGERIES     • ORIF TIBIA FRACTURE Left 3/3/2022    Procedure: OPEN REDUCTION W/ INTERNAL FIXATION (ORIF) LEFT TIBIA DANIELLEON FRACTURE, REMOVAL OF EXTERNAL FIXATOR;  Surgeon: Winter Dominguez MD;  Location: [facility];  Service: Orthopedics     Social History   Social History     Substance and Sexual Activity   Alcohol Use No     Social History     Substance and Sexual Activity   Drug Use No     E-Cigarette/Vaping   • E-Cigarette Use Never User      E-Cigarette/Vaping Substances   • Nicotine No    • THC No    • CBD No    • Flavoring No    • Other No    • Unknown No      Social History     Tobacco Use   Smoking Status Never   Smokeless Tobacco Never     Family History:   Family History   Problem Relation Age of Onset   • No Known Problems Mother    • No Known Problems Father    • Autism Brother         autistic diosrder   • Diabetes type II Paternal Grandmother         mellitus   • Autism Brother         autistic diosrder   • Depression Other        Review of previous medical records was completed      Meds/Allergies   current meds:   Current Facility-Administered Medications   Medication Dose Route Frequency   • acetaminophen (TYLENOL) tablet 975 mg  975 mg Oral Q8H PRN   • albuterol (PROVENTIL HFA,VENTOLIN HFA) inhaler 2 puff  2 puff Inhalation Q4H PRN   • atorvastatin (LIPITOR) tablet 20 mg  20 mg Oral Daily   • carvedilol (COREG) tablet 25 mg  25 mg Oral BID With Meals   • furosemide (LASIX) injection 80 mg  80 mg Intravenous BID (diuretic)   • heparin (porcine) 25,000 units in 0 45% NaCl 250 mL infusion (premix)  3-24 Units/kg/hr (Order-Specific) Intravenous Titrated   • insulin lispro (HumaLOG) 100 units/mL subcutaneous injection 2-12 Units  2-12 Units Subcutaneous TID AC   • insulin lispro (HumaLOG) 100 units/mL subcutaneous injection 2-12 Units  2-12 Units Subcutaneous HS   • ondansetron (ZOFRAN) injection 4 mg  4 mg Intravenous Q6H PRN   • sacubitril-valsartan (ENTRESTO) 24-26 MG per tablet 1 tablet  1 tablet Oral BID   • spironolactone (ALDACTONE) tablet 50 mg  50 mg Oral Daily    and PTA meds:   Prior to Admission Medications Prescriptions Last Dose Informant Patient Reported? Taking? Albuterol (PROVENTIL IN)   Yes No   Sig: Inhale   Empagliflozin-metFORMIN HCl (Synjardy) 12 5-500 MG TABS   Yes No   Sig: Take by mouth 2 (two) times a day   Semaglutide (OZEMPIC, 0 25 OR 0 5 MG/DOSE, SC)   Yes No   Sig: Inject 0 25 mg under the skin every 7 days   acetaminophen (TYLENOL) 325 mg tablet   No No   Sig: Take 3 tablets (975 mg total) by mouth every 8 (eight) hours as needed for mild pain   atenolol (TENORMIN) 25 mg tablet Not Taking  No No   Sig: Take 1 tablet (25 mg total) by mouth daily   Patient not taking: Reported on 2/18/2023   atorvastatin (LIPITOR) 20 mg tablet   Yes No   Sig: Take 20 mg by mouth daily   carvedilol (COREG) 25 mg tablet   Yes No   Sig: Take 25 mg by mouth 2 (two) times a day with meals   enalapril (VASOTEC) 5 mg tablet   No No   Sig: Take 1 tablet (5 mg total) by mouth 2 (two) times a day   enoxaparin (LOVENOX) 60 mg/0 6 mL   No No   Sig: Inject 0 6 mL (60 mg total) under the skin every 12 (twelve) hours   isosorbide-hydrALAZINE (BIDIL) 20-37 5 MG per tablet   Yes No   Sig: Take 2 tablets by mouth 3 (three) times a day   ivabradine HCl (Corlanor) 5 MG tablet   Yes No   Sig: Take 5 mg by mouth 2 (two) times a day   oxyCODONE (ROXICODONE) 10 MG TABS Not Taking  No No   Sig: You may take 5 mg (0 5 tab) for moderate pain or 10 mg (1 tab) for severe pain, every 4 hours, as needed  Patient not taking: Reported on 2/18/2023   oxyCODONE (Roxicodone) 5 immediate release tablet Not Taking  No No   Sig: Take 1 tablet (5 mg total) by mouth every 6 (six) hours as needed for moderate pain Max Daily Amount: 20 mg   Patient not taking: Reported on 2/18/2023   sacubitril-valsartan (Entresto)  MG TABS   Yes No   Sig: Take 1 tablet by mouth 2 (two) times a day   senna-docusate sodium (SENOKOT-S) 8 6-50 mg per tablet   No No   Sig: Take 1 tablet by mouth 2 (two) times a day for 14 days Continue to take while taking Dilaudid  spironolactone (ALDACTONE) 25 mg tablet   Yes No   Sig: Take 25 mg by mouth daily   traMADol HCl 100 MG TABS Not Taking  No No   Sig: Take 100 mg by mouth 4 (four) times a day as needed (pain)   Patient not taking: Reported on 2/18/2023      Facility-Administered Medications: None       Allergies   Allergen Reactions   • Banana - Food Allergy Other (See Comments)         • Bee Venom Other (See Comments)         • Pollen Extract        Objective   Vitals:Blood pressure 127/60, pulse 90, temperature 97 6 °F (36 4 °C), temperature source Oral, resp  rate (!) 24, height 5' 10" (1 778 m), weight (!) 156 kg (343 lb 0 6 oz), SpO2 94 %  ,Body mass index is 49 22 kg/m²  Intake/Output Summary (Last 24 hours) at 2/19/2023 1351  Last data filed at 2/19/2023 1120  Gross per 24 hour   Intake 1870 ml   Output 3925 ml   Net -2055 ml       Invasive Devices: Invasive Devices     Peripheral Intravenous Line  Duration           Peripheral IV 02/17/23 Left;Ventral (anterior) Hand 1 day    Peripheral IV 02/18/23 Dorsal (posterior); Right Hand 1 day              Examined alongside Dr Jose Coronel  Physical Exam  Constitutional:       Appearance: Normal appearance  He is obese  Comments: Minimally somnolent   HENT:      Head: Normocephalic and atraumatic  Eyes:      Extraocular Movements: Extraocular movements intact and EOM normal       Conjunctiva/sclera: Conjunctivae normal       Pupils: Pupils are equal, round, and reactive to light  Cardiovascular:      Rate and Rhythm: Normal rate  Pulmonary:      Effort: Respiratory distress (mild) present  Musculoskeletal:         General: Normal range of motion  Skin:     General: Skin is warm and dry  Neurological:      Mental Status: He is alert and oriented to person, place, and time  Motor: Motor strength is normal       Coordination: Finger-Nose-Finger Test and Heel to Shin Test normal        Neurologic Exam     Mental Status   Oriented to person, place, and time  Normal comprehension  Mild dysarthria, but intelligible, no aphasia with conversation, naming objects, nor repeating words  Fully oriented, following commands  Cranial Nerves     CN II   Visual fields full to confrontation  CN III, IV, VI   Pupils are equal, round, and reactive to light  Extraocular motions are normal      CN V   Facial sensation intact  CN VII   Facial expression full, symmetric  CN VIII   CN VIII normal      CN IX, X   CN IX normal    CN X normal      CN XI   CN XI normal      CN XII   CN XII normal      Motor Exam   Muscle bulk: normal  Overall muscle tone: normal  Right arm pronator drift: absent  Left arm pronator drift: absent    Strength   Strength 5/5 throughout  Sensory Exam   Light touch normal      Gait, Coordination, and Reflexes     Coordination   Finger to nose coordination: normal  Heel to shin coordination: normal    Tremor   Resting tremor: absent  Intention tremor: absent    Reflexes   Right plantar: normal  Left plantar: normal  Right Kumar: absent  Left Kumar: absent  Right ankle clonus: absent  Left ankle clonus: absent  Difficult to elicit DTR's with body habitus, no hyperreflexia with testing  Gait deferred given current cardiac status          Lab Results:   CBC:   Results from last 7 days   Lab Units 02/19/23  0702 02/18/23  0509 02/17/23  2310   WBC Thousand/uL 6 47 7 72 7 72   RBC Million/uL 5 07 5 09 5 07   HEMOGLOBIN g/dL 13 7 14 0 14 2   HEMATOCRIT % 46 7 46 5 45 2   MCV fL 92 91 89   PLATELETS Thousands/uL 267 273 253   , BMP/CMP:   Results from last 7 days   Lab Units 02/19/23  1400 02/19/23  0659 02/18/23  0509 02/17/23  2310   SODIUM mmol/L  --  136 137 134*   POTASSIUM mmol/L  --  3 9 4 4 3 6   CHLORIDE mmol/L  --  104 104 102   CO2 mmol/L  --  27 25 25   BUN mg/dL  --  15 16 18   CREATININE mg/dL  --  1 28 1 29 1 52*   CALCIUM mg/dL  --  9 4 8 9 9 1   AST U/L 44  --   --  31   ALT U/L 21  --   --  22   ALK PHOS U/L 67  --   --  83 EGFR ml/min/1 73sq m  --  75 74 61   , Vitamin B12:   , HgBA1C:   , TSH:   , Coagulation:   Results from last 7 days   Lab Units 02/19/23  1400   INR  0 99   , Lipid Profile:   , Ammonia:   , Urinalysis:       Invalid input(s): URIBILINOGEN, Drug Screen:   Results from last 7 days   Lab Units 02/18/23  0034   BARBITURATE UR  Negative   BENZODIAZEPINE UR  Negative   THC UR  Negative   COCAINE UR  Negative   METHADONE URINE  Negative   OPIATE UR  Negative   PCP UR  Negative   , Medication Drug Levels:       Invalid input(s): CARBAMAZEPINE,  PHENOBARB, LACOSAMIDE, OXCARBAZEPINE  Imaging Studies: I have personally reviewed pertinent films in PACS   CT head wo contrast   Final Result by Erick Alfaro MD (02/19 1352)      Evolving acute left corpus striatum infarct with mild associated mass effect due to cytotoxic edema without evidence for hemorrhage  I personally discussed this study via [employer] with Vanessa Guzman on 2/19/2023 at 1:52 PM                            Workstation performed: [id number]         CT head without contrast   Final Result by Jamaica Godwin MD (02/18 0245)      No acute intracranial hemorrhage, midline shift, or mass effect  Workstation performed: [id number]         XR chest 1 view portable   ED Interpretation by Zuri Kaufman MD (02/17 3243)   Cardiomegaly, pulmonary edema      Final Result by Holly Hawk MD (02/18 9144)      Given elevated BNP, probable pulmonary edema but evaluation is compromised by body habitus  Workstation performed: [id number]         XR abdomen 1 vw portable    (Results Pending)   MRI inpatient order    (Results Pending)       EKG, Pathology, and Other Studies: I have personally reviewed pertinent reports  VTE Prophylaxis: Sequential compression device (Venodyne)  and Heparin    Code Status: Level 1 - Full Code    Total time spent today 45 minutes   Discussed plan of care with patient/family and primary team: concern for left hemisphere stroke, likely in the setting of LV thrombus and cardiomyopathy, continue heparin drip, close neuro exam monitoring, repeat CT head imaging tomorrow, otherwise management per primary team and heart failure service

## 2023-02-19 NOTE — CASE MANAGEMENT
Case Management Assessment & Discharge Planning Note    Patient name Yuliet Fernandes  Location [facility] 201/[facility] 201-01 MRN [id number]  : 1995 Date 2023       Current Admission Date: 2023  Current Admission Diagnosis:Syncope, seizure like activity   Patient Active Problem List    Diagnosis Date Noted   • Acute hypoxemic respiratory failure ([facility] ) 2023   • Syncope, seizure like activity 2023   • Encephalopathy 2023   • Nonischemic cardiomyopathy ([place] [address] ) 2023   • Elevated troponin 2023   • S/P ORIF (open reduction internal fixation) fracture 2022   • Closed fracture of distal end of left tibia 2022   • Acute pain due to trauma 2022   • CHF exacerbation ([place] [address] ) 2022   • Type 2 diabetes mellitus ([place] [address] ) 2022   • Dyslipidemia 2022   • UBALDO (obstructive sleep apnea) 2022   • Morbid obesity with body mass index of 40 0-49 9 ([place] [address] ) 2017   • Cognitive communication deficit 2015   • Hypertension 2014      LOS (days): 1  Geometric Mean LOS (GMLOS) (days):   Days to GMLOS:     OBJECTIVE:    Risk of Unplanned Readmission Score: 11 9         Current admission status: Inpatient       Preferred Pharmacy:   [facility] [address] [address]  Phone: [phone] Fax: [phone]    [id number] [address], [address]  [address] [address]  Phone: [phone] Fax: [phone]    PATIENT/FAMILY REPORTS NO PREFERRED PHARMACY  No address on file      Primary Care Provider: Chato Almodovar MD    Primary Insurance: Lou Baig  Secondary Insurance:     ASSESSMENT:  Jonah Blandon Proxies    There are no active Health Care Proxies on file         Advance Directives  Does patient have a Health Care POA?: No  Does patient have Advance Directives?: No              Patient Information  Admitted from[de-identified] Home  Mental Status: Alert  Assessment information provided by[de-identified] Parent  Primary Caregiver: Self  Support Systems: Parent  Home entry access options  Select all that apply : No steps to enter home  Type of Current Residence: Bi-level  Upon entering residence, is there a bedroom on the main floor (no further steps)?: Yes  Upon entering residence, is there a bathroom on the main floor (no further steps)?: Yes  In the last 12 months, was there a time when you were not able to pay the mortgage or rent on time?: No  In the last 12 months, how many places have you lived?: 1  In the last 12 months, was there a time when you did not have a steady place to sleep or slept in a shelter (including now)?: No  Living Arrangements: Lives w/ Parent(s)    Activities of Daily Living Prior to Admission  Functional Status: Independent  Completes ADLs independently?: Yes  Ambulates independently?: Yes  Does patient use assisted devices?: No  Does patient currently own DME?: Yes  What DME does the patient currently own?: CPAP  Does patient have a history of Outpatient Therapy (PT/OT)?: No  Does the patient have a history of Short-Term Rehab?: No  Does patient have a history of HHC?: No         Patient Information Continued  Income Source:  Other (Comment) (workers comp)  Does patient have prescription coverage?: Yes  Within the past 12 months, you worried that your food would run out before you got the money to buy more : Never true  Within the past 12 months, the food you bought just didn't last and you didn't have money to get more : Never true  Does patient receive dialysis treatments?: No  Does patient have a history of substance abuse?: No  Does patient have a history of Mental Health Diagnosis?: No         Means of Transportation  Means of Transport to Appts[de-identified] Drives Self  In the past 12 months, has lack of transportation kept you from medical appointments or from getting medications?: No  In the past 12 months, has lack of transportation kept you from meetings, work, or from getting things needed for daily living?: No        DISCHARGE DETAILS:    Discharge planning discussed with[de-identified] mother  Freedom of Choice: Yes                   Contacts  Patient Contacts: Darcy Carroll (Mother) [phone]  Relationship to Patient[de-identified] Family  Contact Method: Phone  Reason/Outcome: Continuity of Care, Emergency Contact, Discharge Planning                   Would you like to participate in our [address] service program?  : No - Declined    CM reviewed d/c planning process including the following: identifying help at home, patient preference for d/c planning needs, Discharge Lounge, [facility] Meds to Bed program, availability of treatment team to discuss questions or concerns patient and/or family may have regarding understanding medications and recognizing signs and symptoms once discharged  CM also encouraged patient to follow up with all recommended appointments after discharge  Patient advised of importance for patient and family to participate in managing patient’s medical well being  Patient/caregiver received discharge checklist   Content reviewed  Patient/caregiver encouraged to participate in discharge plan of care prior to discharge home

## 2023-02-20 ENCOUNTER — APPOINTMENT (INPATIENT)
Dept: NEUROLOGY | Facility: CLINIC | Age: 28
End: 2023-02-20

## 2023-02-20 ENCOUNTER — APPOINTMENT (OUTPATIENT)
Dept: PHYSICAL THERAPY | Facility: CLINIC | Age: 28
End: 2023-02-20

## 2023-02-20 ENCOUNTER — APPOINTMENT (INPATIENT)
Dept: RADIOLOGY | Facility: HOSPITAL | Age: 28
End: 2023-02-20

## 2023-02-20 PROBLEM — I50.23 ACUTE ON CHRONIC SYSTOLIC CONGESTIVE HEART FAILURE (HCC): Status: ACTIVE | Noted: 2022-02-24

## 2023-02-20 LAB
AMMONIA PLAS-SCNC: 24 UMOL/L (ref 11–35)
ANION GAP SERPL CALCULATED.3IONS-SCNC: 7 MMOL/L (ref 4–13)
APTT PPP: 58 SECONDS (ref 23–37)
APTT PPP: 60 SECONDS (ref 23–37)
BUN SERPL-MCNC: 17 MG/DL (ref 5–25)
CALCIUM SERPL-MCNC: 9.2 MG/DL (ref 8.3–10.1)
CHLORIDE SERPL-SCNC: 102 MMOL/L (ref 96–108)
CHOLEST SERPL-MCNC: 156 MG/DL
CO2 SERPL-SCNC: 28 MMOL/L (ref 21–32)
CREAT SERPL-MCNC: 1.32 MG/DL (ref 0.6–1.3)
EST. AVERAGE GLUCOSE BLD GHB EST-MCNC: 166 MG/DL
GFR SERPL CREATININE-BSD FRML MDRD: 72 ML/MIN/1.73SQ M
GLUCOSE SERPL-MCNC: 128 MG/DL (ref 65–140)
GLUCOSE SERPL-MCNC: 131 MG/DL (ref 65–140)
GLUCOSE SERPL-MCNC: 151 MG/DL (ref 65–140)
GLUCOSE SERPL-MCNC: 159 MG/DL (ref 65–140)
GLUCOSE SERPL-MCNC: 94 MG/DL (ref 65–140)
HBA1C MFR BLD: 7.4 %
HDLC SERPL-MCNC: 58 MG/DL
LDLC SERPL CALC-MCNC: 78 MG/DL (ref 0–100)
MAGNESIUM SERPL-MCNC: 2.7 MG/DL (ref 1.6–2.6)
POTASSIUM SERPL-SCNC: 3.6 MMOL/L (ref 3.5–5.3)
SODIUM SERPL-SCNC: 137 MMOL/L (ref 135–147)
TRIGL SERPL-MCNC: 99 MG/DL
TSH SERPL DL<=0.05 MIU/L-ACNC: 4.27 UIU/ML (ref 0.45–4.5)
VIT B12 SERPL-MCNC: 269 PG/ML (ref 100–900)

## 2023-02-20 RX ORDER — CARVEDILOL 3.12 MG/1
3.12 TABLET ORAL 2 TIMES DAILY WITH MEALS
Status: DISCONTINUED | OUTPATIENT
Start: 2023-02-20 | End: 2023-02-21 | Stop reason: HOSPADM

## 2023-02-20 RX ORDER — BUMETANIDE 0.25 MG/ML
0.5 INJECTION INTRAMUSCULAR; INTRAVENOUS CONTINUOUS
Status: DISCONTINUED | OUTPATIENT
Start: 2023-02-20 | End: 2023-02-21 | Stop reason: HOSPADM

## 2023-02-20 RX ORDER — HYDROCODONE BITARTRATE AND HOMATROPINE METHYLBROMIDE ORAL SOLUTION 5; 1.5 MG/5ML; MG/5ML
5 LIQUID ORAL EVERY 4 HOURS PRN
Status: DISCONTINUED | OUTPATIENT
Start: 2023-02-20 | End: 2023-02-20 | Stop reason: SDUPTHER

## 2023-02-20 RX ORDER — CYANOCOBALAMIN 1000 UG/ML
1000 INJECTION, SOLUTION INTRAMUSCULAR; SUBCUTANEOUS DAILY
Status: DISCONTINUED | OUTPATIENT
Start: 2023-02-20 | End: 2023-02-21 | Stop reason: HOSPADM

## 2023-02-20 RX ORDER — BUMETANIDE 0.25 MG/ML
1 INJECTION INTRAMUSCULAR; INTRAVENOUS CONTINUOUS
Status: DISCONTINUED | OUTPATIENT
Start: 2023-02-20 | End: 2023-02-20

## 2023-02-20 RX ORDER — POTASSIUM CHLORIDE 20 MEQ/1
40 TABLET, EXTENDED RELEASE ORAL ONCE
Status: COMPLETED | OUTPATIENT
Start: 2023-02-20 | End: 2023-02-20

## 2023-02-20 RX ORDER — BUMETANIDE 0.25 MG/ML
4 INJECTION INTRAMUSCULAR; INTRAVENOUS ONCE
Status: COMPLETED | OUTPATIENT
Start: 2023-02-20 | End: 2023-02-20

## 2023-02-20 RX ORDER — HYDROCODONE BITARTRATE AND HOMATROPINE METHYLBROMIDE ORAL SOLUTION 5; 1.5 MG/5ML; MG/5ML
5 LIQUID ORAL EVERY 4 HOURS PRN
Status: DISCONTINUED | OUTPATIENT
Start: 2023-02-20 | End: 2023-02-21 | Stop reason: HOSPADM

## 2023-02-20 RX ORDER — BENZONATATE 100 MG/1
100 CAPSULE ORAL 3 TIMES DAILY
Status: DISCONTINUED | OUTPATIENT
Start: 2023-02-20 | End: 2023-02-21 | Stop reason: HOSPADM

## 2023-02-20 RX ADMIN — CYANOCOBALAMIN 1000 MCG: 1000 INJECTION, SOLUTION INTRAMUSCULAR at 12:32

## 2023-02-20 RX ADMIN — SACUBITRIL AND VALSARTAN 1 TABLET: 49; 51 TABLET, FILM COATED ORAL at 18:00

## 2023-02-20 RX ADMIN — POTASSIUM CHLORIDE 40 MEQ: 1500 TABLET, EXTENDED RELEASE ORAL at 21:14

## 2023-02-20 RX ADMIN — BENZONATATE 100 MG: 100 CAPSULE ORAL at 16:38

## 2023-02-20 RX ADMIN — INSULIN LISPRO 2 UNITS: 100 INJECTION, SOLUTION INTRAVENOUS; SUBCUTANEOUS at 21:14

## 2023-02-20 RX ADMIN — CARVEDILOL 3.12 MG: 3.12 TABLET, FILM COATED ORAL at 16:38

## 2023-02-20 RX ADMIN — FUROSEMIDE 80 MG: 10 INJECTION, SOLUTION INTRAVENOUS at 15:27

## 2023-02-20 RX ADMIN — HEPARIN SODIUM 15 UNITS/KG/HR: 10000 INJECTION, SOLUTION INTRAVENOUS at 03:12

## 2023-02-20 RX ADMIN — BENZONATATE 100 MG: 100 CAPSULE ORAL at 12:32

## 2023-02-20 RX ADMIN — IOHEXOL 100 ML: 350 INJECTION, SOLUTION INTRAVENOUS at 13:59

## 2023-02-20 RX ADMIN — HEPARIN SODIUM 15 UNITS/KG/HR: 10000 INJECTION, SOLUTION INTRAVENOUS at 16:37

## 2023-02-20 RX ADMIN — POTASSIUM CHLORIDE 40 MEQ: 1500 TABLET, EXTENDED RELEASE ORAL at 15:23

## 2023-02-20 RX ADMIN — BENZONATATE 100 MG: 100 CAPSULE ORAL at 21:14

## 2023-02-20 RX ADMIN — Medication 1 MG/HR: at 22:15

## 2023-02-20 RX ADMIN — SACUBITRIL AND VALSARTAN 1 TABLET: 24; 26 TABLET, FILM COATED ORAL at 08:53

## 2023-02-20 RX ADMIN — BUMETANIDE 4 MG: 0.25 INJECTION INTRAMUSCULAR; INTRAVENOUS at 22:15

## 2023-02-20 RX ADMIN — ATORVASTATIN CALCIUM 20 MG: 20 TABLET, FILM COATED ORAL at 08:53

## 2023-02-20 RX ADMIN — FUROSEMIDE 80 MG: 10 INJECTION, SOLUTION INTRAVENOUS at 08:53

## 2023-02-20 RX ADMIN — SPIRONOLACTONE 50 MG: 50 TABLET ORAL at 08:53

## 2023-02-20 RX ADMIN — HYDROCODONE BITARTRATE AND HOMATROPINE METHYLBROMIDE 5 ML: 5; 1.5 SYRUP ORAL at 19:04

## 2023-02-20 RX ADMIN — INSULIN LISPRO 2 UNITS: 100 INJECTION, SOLUTION INTRAVENOUS; SUBCUTANEOUS at 12:32

## 2023-02-20 NOTE — OCCUPATIONAL THERAPY NOTE
Occupational Therapy Evaluation     Patient Name: Sachin Junior  Today's Date: 2/20/2023  Problem List  Principal Problem:    Syncope, seizure like activity  Active Problems:    CHF exacerbation (HCC)    Type 2 diabetes mellitus (HCC)    UBALDO (obstructive sleep apnea)    Acute hypoxemic respiratory failure (HCC)    CVA (cerebral vascular accident) ([facility] )    Nonischemic cardiomyopathy ([facility] )    Elevated troponin    Past Medical History  Past Medical History:   Diagnosis Date    Enchondroma of hand bone     last assessed: 4/21/2015    Heart trouble     Hypertension     Palpitations      Past Surgical History  Past Surgical History:   Procedure Laterality Date    EXTERNAL FIXATOR APPLICATION Left 5/98/4193    Procedure: APPLICATION EXTERNAL FIXATION DEVICE LOWER EXTREMITY;  Surgeon: Gaurav Hobson MD;  [facility];  Service: Orthopedics    NO PAST SURGERIES      ORIF TIBIA FRACTURE Left 3/3/2022    Procedure: OPEN REDUCTION W/ INTERNAL FIXATION (ORIF) LEFT TIBIA PILON FRACTURE, REMOVAL OF EXTERNAL FIXATOR;  Surgeon: Gaurav Hobson MD;  [facility];  Service: Orthopedics           02/20/23 0952   OT Last Visit   OT Visit Date 02/20/23   Note Type   Note type Evaluation   Additional Comments Pt seen w/ PT to increase safety, decrease fall risk, and maximize functional/occupational performance 2* medical complexity which is a regression from pt's functional baseline  Pain Assessment   Pain Assessment Tool 0-10   Pain Score No Pain   Hospital Pain Intervention(s) Repositioned; Ambulation/increased activity; Emotional support   Restrictions/Precautions   Weight Bearing Precautions Per Order No   Other Precautions Multiple lines;Telemetry; Fall Risk   Home Living   Type of Home House  (Bilevel home w/ 0 KRISTIN)   Home Layout Two level;Performs ADLs on one level; Able to live on main level with bedroom/bathroom; Access   Bathroom Shower/Tub Tub/shower unit   Bathroom Toilet Standard   Bathroom Equipment Other (Comment)  (No DME)   Bathroom Accessibility Accessible   Home Equipment Other (Comment)  (CPAP)   Additional Comments Pt reports living in a bilevel with 0 KRISTIN with his parents; reports staying on the first floor; no DME PTA; per chart review, pt w/ CPAP   Prior Function   Level of [place] Independent with ADLs; Independent with functional mobility; Independent with IADLS   Lives With [facility] Help From Family   IADLs Family/Friend/Other provides transportation   Falls in the last 6 months 0   Lifestyle   Autonomy PTA, pt was independent in ADLs/IADLs and functional mobility w/ no DME; (-) driving   Reciprocal Relationships Lives with his parents   Service to Others Per pt report and chart review, pt previously worked 4 jobs; 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   Intrinsic Gratification Pt reports enjoying sleeping   Subjective   Subjective "I feel okay "   ADL   Where Assessed Edge of bed   Eating Assistance 7  Independent   Grooming Assistance 7  Independent   UB Bathing Assistance 7  Independent   LB [address] 7  Independent   Bed Mobility   Supine to Sit 7  Independent   Sit to Supine Unable to assess   Additional Comments At end of session, pt left sitting upright in recliner with all functional needs in reach   Transfers   Sit to Stand 7  Independent   Stand to Sit 7  Independent   Additional Comments w/ no DME   Functional Mobility   Functional Mobility 7  Independent   Additional Comments Pt completed long household functional mobility distances @ an independent level w/ no DME   Balance   Static Sitting Normal   Dynamic Sitting Good   Static Standing Fair +   Dynamic Standing Fair   Ambulatory Fair   Activity Tolerance   Activity Tolerance Patient tolerated treatment well;Patient limited by fatigue   Medical Staff Made Venessa Duff, ARTEMIOT   Nurse Made Aware RN cleared   RUE Assessment   RUE Assessment WFL   LUE Assessment   LUE Assessment WFL   Hand Function   Gross Motor Coordination Functional   Fine Motor Coordination Functional   Psychosocial   Psychosocial (WDL) WDL   Cognition   Overall Cognitive Status WFL   Attention Within functional limits   Memory Within functional limits   Following Commands Follows all commands and directions without difficulty   Comments Pt pleasant and cooperative   Assessment   Prognosis Fair   Assessment Pt is a 29 yo male who [facility] [place] [facility] loss of consciousness with sudden collapse  Per chart review, on 2/19, neurology consulted for questionable speech difficult with dysarthria in which imaging revealed "Evolving acute left corpus striatum infarct with mild associated mass effect due to cytotoxic edema without evidence for hemorrhage "  Pt  has a past medical history of Enchondroma of hand bone, Heart trouble, Hypertension, and Palpitations  Pt with active OT orders and OT consulted to assess pt's functional status and occupational performance to determine safe d/c needs  Pt seen with PT to increase safety, decrease fall risk, and maximize functional/occupational performance 2* medical complexity which is a regression from pt's functional baseline  Pt lives with his parents in a bilevel home with 0 KRISTIN, reporting that he stays on the first floor  PTA, pt was independent in ADLs/IADLs and functional mobility w/ no DME  (-) driving  Currently, pt performing bed mobility, functional transfers/mobility w/ no DME, and UB/LB ADLs all @ an independent level  From an OT standpoint, recommend discharge to home with increased support once medically stable  At this time, pt demonstrates good insight/safety awareness and does not express any concerns regarding performing ADL/IADL/functional mobility tasks  No further skilled acute care OT services are needed at this time     The patient's raw score on the AM-PAC Daily Activity Inpatient Short Form is 24  A raw score of greater than or equal to 19 suggests the patient may benefit from discharge to home  Please refer to the recommendation of the Occupational Therapist for safe discharge planning  Recommend continued engagement in ADL/functional mobility tasks with nursing and restorative therapy staff as appropriate to promote the highest level of independence prior to discharge  OT is discharging pt from caseload at this time, please reconsult if needed  Goals   Patient Goals To go home   Plan   OT Frequency Eval only   Recommendation   OT Discharge Recommendation No rehabilitation needs  (Increased support/assistance upon d/c)   AM-PAC Daily Activity Inpatient   Lower Body Dressing 4   Bathing 4   Toileting 4   Upper Body Dressing 4   Grooming 4   Eating 4   Daily Activity Raw Score 24   Daily Activity Standardized Score (Calc for Raw Score >=11) 57 54   AM-PAC Applied Cognition Inpatient   Following a Speech/Presentation 4   Understanding Ordinary Conversation 4   Taking Medications 4   Remembering Where Things Are Placed or Put Away 4   Remembering List of 4-5 Errands 4   Taking Care of Complicated Tasks 4   Applied Cognition Raw Score 24   Applied Cognition Standardized Score 62 21   End of Consult   Education Provided Yes   Patient Position at End of Consult Bedside chair; All needs within reach   Nurse Communication Nurse aware of consult     Carolin Ochoa MS, OTR/L

## 2023-02-20 NOTE — ASSESSMENT & PLAN NOTE
Lab Results   Component Value Date    HGBA1C 6 6 (H) 04/09/2022       Recent Labs     02/19/23  1104 02/19/23  1552 02/19/23 2046 02/20/23  0633   POCGLU 141* 155* 149* 94       Blood Sugar Average: Last 72 hrs:  (P) 154 [id number]     Patient with history of type 2 diabetes on Synjardy and Ozempic prior to arrival     Plan:  Blood glucose currently well controlled  Continue sliding scale insulin, blood glucose checks  Adjust insulin as necessary

## 2023-02-20 NOTE — PROGRESS NOTES
Progress Note - Neurology   Yumiko Gonzales  28 y o  male MRN: [id number]  Unit/Bed#: [facility] 201-01 Encounter: [id number]    Assessment/Plan   CVA (cerebral vascular accident) [facility])  Assessment & Plan  57-year-old male with history of known nonischemic cardiomyopathy with ICD placement, diabetes, hypertension, hyperlipidemia, obstructive sleep apnea  He presented on 2/17 to the ED following episode of loss of consciousnes witnessed by his friends (involved foaming at the mouth, unclear if definitive seizure activity was witnessed)  Following the event he was awake but with verbal responses (which progressively improved throughout the day 2/18)  2/19: Neurology consulted for questionable speech difficulty with dysarthria; his 2D echocardiogram did reveal evidence of left ventricular thrombus  CT head was repeated and revealed an interim left hemisphere infarct in the left corpus stratum/caudate head, putamen, internal capsule  The patient to undergo formal stroke workup, likely in the setting of LV thrombus and advanced cardiomyopathy  Will also rule out epileptiform abnormalities as patient's mother notes several weeks of increased lethargy  Mother also notes of interest he was recently started on Ozempic by his primary care doctor over the past week or two for weight loss, this was discontinued    Plan:  -routine EEG, pending, no need for AED at this time  -Repeat CT head completed 2/19, results as mentioned above with the left hemisphere infarct noted  -Will check repeat CT head today, 2/20 to ensure no interim hemorrhagic conversion of infarct while being on heparin (will check CTA head/neck at that time as well to evaluate vasculature)  -MRI brain unable to have completed with ICD per record review  -2D echo yesterday with EF 15 to 20%, left ventricular atypical thrombus noted    -Was started on heparin infusion given the LV thrombus concern   -PTT monitoring (defer specific PTT goal to heart failure service give the LV thrombus; currently is on stroke protocol with PTT goal 50-70), APTT 58 L  -continue statin  -hemoglobin A1C pending  -lipid panel - LDL 78  -neuro checks  -telemetry monitoring  -provide stroke education  -therapy evaluations  -serum labwork pending:   -TSH, ammonia - wnl   - B12 269 L, medical team aware (started on B12 supplemenation  -heart failure service following, appreciate input   -CHF exacerbation, given lasix      Nonischemic cardiomyopathy ([facility] )  Assessment & Plan  -2D echo as mentioned above, started on heparin drip  -AICD interrogation, no events/shocks  -heart failure service following with management    CHF exacerbation ([facility] 75 )  Assessment & Plan  Wt Readings from Last 3 Encounters:   02/19/23 (!) 156 kg (343 lb 0 6 oz)   02/15/23 (!) 154 kg (340 lb)   12/14/22 (!) 154 kg (340 lb)     -management via primary team, heart failure service  -on lasix, Entresto, coreg, spironolactone      Type 2 diabetes mellitus ([facility] )  Assessment & Plan  Lab Results   Component Value Date    HGBA1C 6 6 (H) 04/09/2022       Recent Labs     02/18/23  1600 02/18/23  2126 02/19/23  0642 02/19/23  1104   POCGLU 102 131 119 141*       Blood Sugar Average: Last 72 hrs:  (P) 125 75     -euglycemic goals, management per primary team    Acute hypoxemic respiratory failure ([facility] 75 )  Assessment & Plan  -initially on BIPAP; now improved      Deep [place]is  will need follow up in in 4 weeks with neurovascular attending  He will not require outpatient neurological testing  Subjective:   Neurological follow up  The patient denies any further neurologic complaint, in particular he denies headache, problems with his speech, problems with vision, facial droop, facial numbness, problems with swallowing, one-sided weakness, one-sided numbness, one-sided ataxia  He denies chest pain, shortness of breath, his nausea and vomiting have improved    He denies any back pain radiating into his legs or truncal instability  No other complaints, no events overnight per nursing at bedside  ROS:  12 point review of's as per HPI otherwise  Vitals: Blood pressure 124/88, pulse 100, temperature 98 7 °F (37 1 °C), temperature source Oral, resp  rate (!) 26, height 5' 10" (1 778 m), weight (!) 154 kg (339 lb), SpO2 94 %  ,Body mass index is 47 28 kg/m²  Current Facility-Administered Medications   Medication Dose Route Frequency Provider Last Rate   • acetaminophen  975 mg Oral Q8H PRN Juan J Dutta MD     • albuterol  2 puff Inhalation Q4H PRN Juan J Dutta MD     • atorvastatin  20 mg Oral Daily Juan J Dutta MD     • cyanocobalamin  1,000 mcg Intramuscular Daily Shell Chang MD      Followed by   • [START ON 2/23/2023] cyanocobalamin  1,000 mcg Oral Daily Shell Chang MD     • dextromethorphan-guaiFENesin  10 mL Oral Q4H PRN Shell Chang MD     • furosemide  80 mg Intravenous BID (diuretic) Michaelle Cheng MD     • heparin (porcine)  3-24 Units/kg/hr (Order-Specific) Intravenous Titrated Shell Chang MD 15 Units/kg/hr (02/20/23 1847)   • insulin lispro  2-12 Units Subcutaneous TID [facility] [place] Juan J Dutta MD     • insulin lispro  2-12 Units Subcutaneous HS Juan J Dutta MD     • ondansetron  4 mg Intravenous Q6H PRN Shell Chang MD     • sacubitril-valsartan  1 tablet Oral BID Israel Ayala MD     • spironolactone  50 mg Oral Daily Juan J Dutta MD         Physical Exam:   Vital Sign reviewed  Constitutional - in NAD, sitting at the edge of bed  HEENT - NC/AT, no tongue bite noted,  conjunctiva clear, oral mucosa free of exudate  Cardiac - rate regular  Lungs -no respiratory distress noted  Abdomen - obese  Extremities - No edema noted  Skin - no rashes noted  Neurological  Mental status - the patient is awake alert oriented x 3, with no evidence of aphasia or dysarthria, patient is able to follow simple commands, is able to follow complex commands  No para-phasic errors noted    He is able to read, repeat, recognize objects, provide hx and has good insight into current medical condition  Cranial nerves 2 through 12 are intact, pupils are equal and reactive, EOMI, no facial droop noted, tongue was midline, VF intact to confrontation  Motor - 5/5 upper extremities and lower extremities, without drift, normal tone and bulk  No drift in the upper or lowers, non focal motor examination  No tremor or fixed deficit noted  Rapid movements are equal bilaterally  Sensation - non-focal to touch, no neglect noted  Coordination -  no ataxia noted on finger-to-nose  No evidence of seizure activity, observed  Lab, Imaging and other studies:   I have personally reviewed pertinent reports    , CBC:   Results from last 7 days   Lab Units 02/19/23  0702 02/18/23  0509 02/17/23  2310   WBC Thousand/uL 6 47 7 72 7 72   RBC Million/uL 5 07 5 09 5 07   HEMOGLOBIN g/dL 13 7 14 0 14 2   HEMATOCRIT % 46 7 46 5 45 2   MCV fL 92 91 89   PLATELETS Thousands/uL 267 273 253   , BMP/CMP:   Results from last 7 days   Lab Units 02/20/23  0632 02/19/23  1400 02/19/23  0659 02/18/23  0509 02/17/23  2310   SODIUM mmol/L 137  --  136 137 134*   POTASSIUM mmol/L 3 6  --  3 9 4 4 3 6   CHLORIDE mmol/L 102  --  104 104 102   CO2 mmol/L 28  --  27 25 25   BUN mg/dL 17  --  15 16 18   CREATININE mg/dL 1 32*  --  1 28 1 29 1 52*   CALCIUM mg/dL 9 2  --  9 4 8 9 9 1   AST U/L  --  44  --   --  31   ALT U/L  --  21  --   --  22   ALK PHOS U/L  --  67  --   --  83   EGFR ml/min/1 73sq m 72  --  75 74 61   , Vitamin B12:   Results from last 7 days   Lab Units 02/20/23  2215   VITAMIN B 12 pg/mL 269   , HgBA1C:   , TSH:   Results from last 7 days   Lab Units 02/20/23  0632   TSH 3RD GENERATON uIU/mL 4 270   , Coagulation:   Results from last 7 days   Lab Units 02/19/23  1400   INR  0 99   , Lipid Profile:   Results from last 7 days   Lab Units 02/20/23  0632   HDL mg/dL 58   LDL CALC mg/dL 78   TRIGLYCERIDES mg/dL 99   , Ammonia: Results from last 7 days   Lab Units 02/20/23  7708   AMMONIA umol/L 24   , Urinalysis:       Invalid input(s): URIBILINOGEN, Drug Screen:   Results from last 7 days   Lab Units 02/18/23  0034   BARBITURATE UR  Negative   BENZODIAZEPINE UR  Negative   THC UR  Negative   COCAINE UR  Negative   METHADONE URINE  Negative   OPIATE UR  Negative   PCP UR  Negative     Counseling / Coordination of Care  Total time spent today 30 minutes  Greater than 50% of total time was spent with the patient and / or family counseling and / or coordination of care  A description of the counseling / coordination of care: floor time, reviewing records, imaging, physical examination, reviewing testing completed this admission  Reviwing with patient plan of care  Reviewed case with neurology attending, history and physical examination, labs and imaging completed, plan of care as per attending physician  Please see attestation for further details  Examined alongside attending physician
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
## 2023-02-20 NOTE — PROGRESS NOTES
Heart Failure Progress note  Unit/Bed#: ICCU 201-01 Encounter: [id number]        Sherry Done  28 y o  male [id number]  Hospital Stay Days: 2    Assessment and Plan      Current Problem List   Principal Problem:    Syncope, seizure like activity  Active Problems:    CHF exacerbation (HCC)    Type 2 diabetes mellitus (HCC)    UBALDO (obstructive sleep apnea)    Acute hypoxemic respiratory failure (HCC)    CVA (cerebral vascular accident) ([facility] [address] )    Nonischemic cardiomyopathy (HCC)    Elevated troponin    Assessment/Plan:    1  Acute on chronic heart failure with reduced ejection fraction, Stage C, NYHA II Etiology: LV noncompaction  Follows at [facility]  Denies heavy alcohol or drug use  No known family history of heart disease  H/o of admission 4/26-5/2/2022 with acute heart failure exacerbation and cardiogenic shock requiring milrinone that was eventually discontinued  History of clean cardiac cath  TSH normal this admission  · Home meds include Coreg 25 mg BID, entresto 97/103 mg, spironolactone 50 mg daily, on empagliflozin/metformin 12 5-500, and torsemide 80 mg daily but then increased to 100 mg outpatient  · On 80 mg IV lasix here - cont  · SCD: Has ICD - no events on admission  · Narrow QRS - precludes CRT  · Cont  Entresto 24/26 here  · Cont  Coreg  · Cont  Spirolactone  2   LV thrombus - noted new this admission - thought to have cardioembolic stroke  ·  Cont  IV heparin  3  Syncope/collapse  ·  Not completely clear etiology - no ICD shocks  UDS negative  Possibly 2/2 stroke  4  UBALDO  ·  Uses CPAP at home  5  Type II DM  6  Acute hypoxic resp failure  ·  Off oxygen at this time  Subjective     28year old male with history of severe non ischemic CM 2/2 noncompaction who follows at [facility] who presented on 2/17/2023 to [facility] 2/2 seizure like activity  The patient has history of ICD placement, cardiogenic shock, type II DM, HTN, HLD UBALDO   Patient on presentation had a history of seizing while at work that evening  He was tachycardic on presentation  CT head without any acute abnormalities originally  He presented with sodium of 134, creatine of 1 52, lactic acid of 2 8, bnp of 903, mildly elevated troponin  He was found to have a new LV thrombus  Repeat CT head showed evidence of evolving acute left corpus striatum infarct with mild associated mass effect due to cytoxic edema  He was seen by neurology who felt the patient likely had a caridoembolic event  He was started on IV diuresis and remains on his coreg and spirolactone  He is also started on heparin at this time  He remains on entresto as well  Creatine has improved to 1 2 yesterday this morning it went up to 1 3  baseline is around 1-1  2  he is net negative 2 liters last 24 hours but only 620 documented in  3 8 this hospitalization  Down 4 pounds since yesterday and since admission - used bed scale yesterday and standing scale today  Remains on IV lasix 80 mg BID  Spironolactone 50 mg, entresto 24/26  Off of home SGLT2i  Did have episodes of non conducted PAC's yesterday       Echocardiogram 2/18/23  LVEF: 15-20%, my read  LVIDd: 7 7 cm  RV: not well visualized, appears dilated, mildly reduced systolic function  MR: mild  PASP: 53mmHg  RVOT: no notching  Other: restrictive diastology, LV apical thrombus     LVHN studies:  TTE 4/27/22: LVEF 20-25%  LVIDD 6 9cm  No LV apical thrombus  The right ventricular free wall is not well-visualized but appears to be   dilated based on limited views   Probably normal right ventricular   systolic function  Cardiac cath 4/27/22:  PA sat 33 5%  Ao sat 96%  Fiock CO 3 94 and Luis CI 1 53  Normal coronaries  Cardiac MRI 6/15/21:   Left ventricle: Severely enlarged, with end-diastolic dimension 7 8 cm  Mild   thickening of the anterior wall, 14 mm  The inferior wall measures 11 mm in   thickness  Severe global hypokinesis and severely reduced systolic function,   ejection fraction 14%  There is hyper trabeculation most pronounced in the apex   and lateral wall, with non-compacted to compacted ratio of 3 9  Right ventricle: Moderately enlarged  Global hypokinesis  Severely reduced   systolic function, ejection fraction 10%  Cardiac cath 2022 - normal coronary vessels  MRI 2021: IMPRESSION:   Impression: Severe left ventricular dilatation and severely reduced systolic   function, LVEF 14%  Hypertrabeculation, findings overall are suspicious for   noncompaction cardiomyopathy  Moderate RV enlargement with severely reduced   systolic function  Objective     Vitals: Temp (24hrs), Av 3 °F (36 8 °C), Min:97 4 °F (36 3 °C), Max:98 7 °F (37 1 °C)  Current: Temperature: 98 7 °F (37 1 °C)  Patient Vitals for the past 24 hrs:   BP Temp Temp src Pulse Resp SpO2 Weight   23 0824 124/88 98 7 °F (37 1 °C) Oral 100 (!) 26 94 % --   23 0621 114/73 -- -- 90 -- 91 % --   23 0600 -- -- -- -- -- -- (!) 154 kg (339 lb)   23 0034 -- -- -- -- -- 98 % --   23 2233 -- 98 6 °F (37 °C) Oral -- -- -- --   23 2230 102/59 -- -- 92 -- 96 % --   23 1910 -- 98 5 °F (36 9 °C) Oral -- -- -- --   23 1900 (!) 96/47 -- -- 82 -- (!) 81 % --   23 1456 -- (!) 97 4 °F (36 3 °C) Oral -- -- -- --   23 1030 -- -- -- 90 -- 94 % --    Body mass index is 47 28 kg/m²  Physical Exam:  Physical Exam  Constitutional:       Appearance: Normal appearance  Cardiovascular:      Rate and Rhythm: Normal rate and regular rhythm  Comments: Elevated JVP  Pulmonary:      Effort: Pulmonary effort is normal       Breath sounds: Normal breath sounds  No rhonchi  Abdominal:      General: Abdomen is flat  Musculoskeletal:         General: No swelling  Normal range of motion  Right lower leg: No edema  Left lower leg: No edema  Neurological:      Mental Status: He is alert           Invasive Devices     Peripheral Intravenous Line  Duration Peripheral IV 02/18/23 Dorsal (posterior); Right Hand 2 days    Peripheral IV 02/19/23 Dorsal (posterior); Left Hand <1 day    Peripheral IV 02/19/23 Right Antecubital <1 day                    Labs:   Results from last 7 days   Lab Units 02/19/23  0702 02/18/23  0509 02/17/23  2310   WBC Thousand/uL 6 47 7 72 7 72   HEMOGLOBIN g/dL 13 7 14 0 14 2   HEMATOCRIT % 46 7 46 5 45 2   PLATELETS Thousands/uL 267 273 253   NEUTROS PCT % 53 67 64   MONOS PCT % 11 9 9      Results from last 7 days   Lab Units 02/20/23  0632 02/20/23  0121 02/19/23 2056 02/19/23  1400 02/19/23  0659 02/18/23  0509 02/17/23  2310   SODIUM mmol/L 137  --   --   --  136 137 134*   POTASSIUM mmol/L 3 6  --   --   --  3 9 4 4 3 6   CHLORIDE mmol/L 102  --   --   --  104 104 102   CO2 mmol/L 28  --   --   --  27 25 25   BUN mg/dL 17  --   --   --  15 16 18   CREATININE mg/dL 1 32*  --   --   --  1 28 1 29 1 52*   CALCIUM mg/dL 9 2  --   --   --  9 4 8 9 9 1   ALK PHOS U/L  --   --   --  67  --   --  83   ALT U/L  --   --   --  21  --   --  22   AST U/L  --   --   --  44  --   --  31   MAGNESIUM mg/dL 2 7*  --   --   --  2 8* 2 6  --    PHOSPHORUS mg/dL  --   --   --   --  3 5 4 5  --    INR   --   --   --  0 99  --   --   --    PTT seconds  --  58* 58* 26  --   --   --    EGFR ml/min/1 73sq m 72  --   --   --  75 74 61     Results from last 7 days   Lab Units 02/20/23  0121 02/19/23 2056 02/19/23  1400   INR   --   --  0 99   PTT seconds 58* 58* 26     Results from last 7 days   Lab Units 02/18/23  0240   LACTIC ACID mmol/L 1 6         No results found for: PHART, WHS9EXV, PO2ART, YHW6NZX, Q0IPIGOD, BEART, SOURCE  No components found for: HIV1X2  No results found for: HAV, HEPAIGM, HEPBIGM, HEPBCAB, HBEAG, HEPCAB  No results found for: SPEP, UPEP   Lab Results   Component Value Date    HGBA1C 6 6 (H) 04/09/2022    HGBA1C 7 3 (H) 07/22/2021    HGBA1C 7 5 (H) 03/01/2021     No results found for: CHOL   Lab Results   Component Value Date    HDL 58 02/20/2023      Lab Results   Component Value Date    LDLCALC 78 02/20/2023      Lab Results   Component Value Date    TRIG 99 02/20/2023     No components found for: PROCAL      Micro:      Urinalysis:  Lab Results   Component Value Date    BDZUR Negative 02/18/2023    COCAINEUR Negative 02/18/2023    OPIATEUR Negative 02/18/2023    PCPUR Negative 02/18/2023    THCUR Negative 02/18/2023    ETOH <3 02/18/2023    ACTMNPHEN <2 (L) 91/94/9821    SALICYLATE <3 (L) 76/15/3231          Invalid input(s): URIBILINOGEN        Intake and Outputs:  I/O       02/18 0701  02/19 0700 02/19 0701 02/20 0700    P  O  1370 620    Total Intake(mL/kg) 1370 (8 8) 620 (4)    Urine (mL/kg/hr) 2700 (0 7) 2325 (1)    Emesis/NG output 0     Stool 0     Total Output 2700 2325    Net -1330 -1705          Unmeasured Stool Occurrence 1 x         Nutrition:  Diet Cardiovascular; Cardiac; Sodium 2 GM, Consistent Carbohydrate Diet Level 3 (6 carb servings/90 grams CHO/meal)  Radiology Results:   CT head wo contrast   Final Result by Aurelio Cohen MD (02/19 1352)      Evolving acute left corpus striatum infarct with mild associated mass effect due to cytotoxic edema without evidence for hemorrhage  I personally discussed this study via [employer] with Ramiro Pio on 2/19/2023 at 1:52 PM                            Workstation performed: [id number]         CT head without contrast   Final Result by Carloz Mcneal MD (02/18 0245)      No acute intracranial hemorrhage, midline shift, or mass effect  Workstation performed: [id number]         XR chest 1 view portable   ED Interpretation by Liberty Durán MD (02/17 5199)   Cardiomegaly, pulmonary edema      Final Result by Guillermo Mclean MD (02/18 2738)      Given elevated BNP, probable pulmonary edema but evaluation is compromised by body habitus                    Workstation performed: [id number]         XR abdomen 1 vw portable    (Results Pending) MRI inpatient order    (Results Pending)   CTA head and neck w wo contrast    (Results Pending)     Scheduled Medications:  atorvastatin, 20 mg, Daily  cyanocobalamin, 1,000 mcg, Daily   Followed by  Rasta Murray ON 2/23/2023] cyanocobalamin, 1,000 mcg, Daily  furosemide, 80 mg, BID (diuretic)  insulin lispro, 2-12 Units, TID AC  insulin lispro, 2-12 Units, HS  sacubitril-valsartan, 1 tablet, BID  spironolactone, 50 mg, Daily      PRN MEDS:  acetaminophen, 975 mg, Q8H PRN  albuterol, 2 puff, Q4H PRN  dextromethorphan-guaiFENesin, 10 mL, Q4H PRN  ondansetron, 4 mg, Q6H PRN      Last 24 Hour Meds: :   Medication Administration - last 24 hours from 02/19/2023 0838 to 02/20/2023 9059       Date/Time Order Dose Route Action Action by     02/20/2023 0634 EST insulin lispro (HumaLOG) 100 units/mL subcutaneous injection 2-12 Units 2 Units Subcutaneous Not Given Grupo Coombs, RN     02/19/2023 1553 EST insulin lispro (HumaLOG) 100 units/mL subcutaneous injection 2-12 Units 2 Units Subcutaneous Given Arpita Bass, TERRY     02/19/2023 1124 EST insulin lispro (HumaLOG) 100 units/mL subcutaneous injection 2-12 Units 2 Units Subcutaneous Not Given Arpita Bass, TERRY     02/19/2023 2215 EST insulin lispro (HumaLOG) 100 units/mL subcutaneous injection 2-12 Units 2 Units Subcutaneous Not Given [employer]upo Coombs, RN     02/19/2023 1553 EST carvedilol (COREG) tablet 25 mg 25 mg Oral Given Arpita Bass, TERRY     02/19/2023 1511 EST furosemide (LASIX) injection 80 mg 80 mg Intravenous Given Arpita Bass, TERRY     02/19/2023 2224 EST sacubitril-valsartan (ENTRESTO) 24-26 MG per tablet 1 tablet 1 tablet Oral Given Grupo Coombs, RN     02/19/2023 1303 EST sacubitril-valsartan (ENTRESTO) 24-26 MG per tablet 1 tablet 1 tablet Oral Given Arpita Bass RN     02/20/2023 3907 EST heparin (porcine) 25,000 units in 0 45% NaCl 250 mL infusion (premix) 15 Units/kg/hr Intravenous Lisandro Coombs, TERRY     02/19/2023 2373 EST heparin (porcine) 25,000 units in 0 45% NaCl 250 mL infusion (premix) 15 Units/kg/hr Intravenous Rate/Dose Change Levon Call, RN     02/19/2023 1505 EST heparin (porcine) 25,000 units in 0 45% NaCl 250 mL infusion (premix) 15 Units/kg/hr Intravenous [address] Richard Peñaloza RN     02/19/2023 1455 EST heparin (porcine) 25,000 units in 0 45% NaCl 250 mL infusion (premix) -- Intravenous Canceled Entry Richard Peñaloza RN          PLEASE NOTE:  This encounter was completed utilizing the [employer] Direct Speech Voice Recognition Software  Grammatical errors, random word insertions, pronoun errors and incomplete sentences are occasional consequences of the system due to software limitations, ambient noise and hardware issues  These may be missed by proof reading prior to affixing electronic signature  Any questions or concerns about the content, text or information contained within the body of this dictation should be directly addressed to the physician for clarification  Please do not hesitate to call me directly if you have any any questions or concerns

## 2023-02-20 NOTE — PROGRESS NOTES
[facility]  Progress Note - Marcela Aldrich  1995, 28 y o  male MRN: [id number]  [facility] Encounter: [id number]  Primary Care Provider: Vianney Macario MD   Date and time admitted to hospital: 2/17/2023 10:21 PM    * Syncope, seizure like activity  Assessment & Plan  Patient with witnessed episode of syncope/seizure-like activity while out with friends 2/17, denies hx of seizures or similar episodes  Pt nonverbal, not following commands post event, progressively more alert throughout the day 2/18  Plan:  · Cardiology consulted for AICD interrogation and echo  · Preliminary echo finding of LV thrombus discussed with Dr Saray Leggett  · Possible CVA from LV thrombus causing his initial presentation  · Repeat CT scan of head is positive for CVA  · Check CTA of head and neck  · MRI is pending  · EEG pending    Elevated troponin  Assessment & Plan  Patient with elevated troponin on arrival with delta of 50  Patient without chest pain, EKG with sinus rhythm, PVCs, possible LAE, T wave abnormality  Plan:  Continue to trend troponins until peak  Cardiology consulted, appreciate recommendations    CVA (cerebral vascular accident) [facility])  [address] [place] [address]  Patient with altered mental status following syncopal/seizure-like episode during which time patient was nonverbal but following commands  Patient now back to baseline mental status per mother at bedside  Plan: For CTA of head and neck today  Awiating MRI brain  Neurology following      Acute on chronic systolic congestive heart failure [facility])  Assessment & Plan  Wt Readings from Last 3 Encounters:   02/20/23 (!) 154 kg (339 lb)   02/19/23 (!) 156 kg (343 lb 14 7 oz)   02/15/23 (!) 154 kg (340 lb)     Pt presented with elevated , crackles and LE edema on exam, increased 02 requirment on bipap overnight  Given Lasix 80 IV in ED  CXR with pulmonary edema       Reviewed home medications with patient, however he does not seem certain on which medications he takes, nor does his mother at bedside  He does not have a readily accessible medication list  Per most recent cardiology note from 2/6/23 cardiac medications include: atorvastatin 20 mg daily, carvedilol 25 mg twice daily, Ivabradine, spironolactone 50 mg daily, torsemide 80 mg daily, Entresto 75 to 103 mg twice daily  Plan:  Continue diuresis  Resumed patient's carvedilol 25 mg twice daily and spironolactone  Cardiology consulted, appreciated recommendations  Strict intake and output  Follow-up BMP        Nonischemic cardiomyopathy [facility])  Assessment & Plan  Patient with history of nonischemic cardiomyopathy (EF 20-25%)  Patient follows with cardiology at [facility] as outpatient  He is status post AICD placement November 11/22  Plan:  Echo with finding of LV thrombus, will continue IV heparin  Continue telemetry  Cardiology consulted, appreciate recommendations  Interrogate AICD      Acute hypoxemic respiratory failure [facility])  Assessment & Plan  Patient initially requiring BiPAP on admission last night, subsequently weaned to room air  Patient does have a history of UBALDO and is noncompliant with CPAP  Recommend patient continue to use BiPAP nightly  UBALDO (obstructive sleep apnea)  Assessment & Plan  Patient with history of UBALDO, noncompliant with CPAP  Patient required BiPAP overnight      Plan:  Continue BiPAP nightly while inpatient    Type 2 diabetes mellitus [facility])  Assessment & Plan  Lab Results   Component Value Date    HGBA1C 6 6 (H) 04/09/2022       Recent Labs     02/19/23  1104 02/19/23  1552 02/19/23  2046 02/20/23  0633   POCGLU 141* 155* 149* 94       Blood Sugar Average: Last 72 hrs:  (P) 442 [id number]     Patient with history of type 2 diabetes on Synjardy and Ozempic prior to arrival     Plan:  Blood glucose currently well controlled  Continue sliding scale insulin, blood glucose checks  Adjust insulin as necessary          VTE Pharmacologic Prophylaxis: VTE Score: 4 Moderate Risk (Score 3-4) - Pharmacological DVT Prophylaxis Ordered: heparin  Patient Centered Rounds: I performed bedside rounds with nursing staff today  Discussions with Specialists or Other Care Team Provider: nurseEMBER    Education and Discussions with Family / Patient: Updated  (mother) via phone  Total Time Spent on Date of Encounter in care of patient: 25 minutes This time was spent on one or more of the following: performing physical exam; counseling and coordination of care; obtaining or reviewing history; documenting in the medical record; reviewing/ordering tests, medications or procedures; communicating with other healthcare professionals and discussing with patient's family/caregivers  Current Length of Stay: 2 day(s)  Current Patient Status: Inpatient   Certification Statement: The patient will continue to require additional inpatient hospital stay due to CTA, EEG, IV heparin  Discharge Plan: pt's mother is requesting a transfer to [facility] for continuity of care  will contact transfer center    Code Status: Level 1 - Full Code    Subjective:   Denies any new complaints  States that he is still short of breath and has orthopnea  Objective:     Vitals:   Temp (24hrs), Av 3 °F (36 8 °C), Min:97 4 °F (36 3 °C), Max:98 7 °F (37 1 °C)    Temp:  [97 4 °F (36 3 °C)-98 7 °F (37 1 °C)] 98 7 °F (37 1 °C)  HR:  [] 100  Resp:  [26] 26  BP: ()/(47-88) 124/88  SpO2:  [81 %-98 %] 94 %  Body mass index is 47 28 kg/m²  Input and Output Summary (last 24 hours): Intake/Output Summary (Last 24 hours) at 2023 1140  Last data filed at 2023 0901  Gross per 24 hour   Intake 569 88 ml   Output 1700 ml   Net -1130 12 ml       Physical Exam:   Physical Exam  Constitutional:       Appearance: Normal appearance  HENT:      Head: Normocephalic and atraumatic        Nose: Nose normal    Eyes:      Extraocular Movements: Extraocular movements intact  Cardiovascular:      Rate and Rhythm: Normal rate and regular rhythm  Pulmonary:      Effort: Pulmonary effort is normal       Breath sounds: Rales present  Musculoskeletal:      Right lower leg: Edema present  Left lower leg: Edema present  Skin:     General: Skin is warm and dry  Neurological:      Mental Status: He is alert  Cranial Nerves: No cranial nerve deficit  Motor: No weakness  Psychiatric:         Mood and Affect: Mood normal          Behavior: Behavior normal           Additional Data:     Labs:  Results from last 7 days   Lab Units 02/19/23  0702   WBC Thousand/uL 6 47   HEMOGLOBIN g/dL 13 7   HEMATOCRIT % 46 7   PLATELETS Thousands/uL 267   NEUTROS PCT % 53   LYMPHS PCT % 31   MONOS PCT % 11   EOS PCT % 4     Results from last 7 days   Lab Units 02/20/23  0632 02/19/23  1400   SODIUM mmol/L 137  --    POTASSIUM mmol/L 3 6  --    CHLORIDE mmol/L 102  --    CO2 mmol/L 28  --    BUN mg/dL 17  --    CREATININE mg/dL 1 32*  --    ANION GAP mmol/L 7  --    CALCIUM mg/dL 9 2  --    ALBUMIN g/dL  --  2 9*   TOTAL BILIRUBIN mg/dL  --  0 63   ALK PHOS U/L  --  67   ALT U/L  --  21   AST U/L  --  44   GLUCOSE RANDOM mg/dL 131  --      Results from last 7 days   Lab Units 02/19/23  1400   INR  0 99     Results from last 7 days   Lab Units 02/20/23  0633 02/19/23  2046 02/19/23  1552 02/19/23  1104 02/19/23  0642 02/18/23  2126 02/18/23  1600 02/18/23  1134 02/18/23  0725 02/18/23  0557 02/17/23  2229   POC GLUCOSE mg/dl 94 149* 155* 141* 119 131 102 117 111 114 171*         Results from last 7 days   Lab Units 02/18/23  0240 02/17/23  2310   LACTIC ACID mmol/L 1 6 2 8*       Lines/Drains:  Invasive Devices     Peripheral Intravenous Line  Duration           Peripheral IV 02/18/23 Dorsal (posterior); Right Hand 2 days    Peripheral IV 02/19/23 Dorsal (posterior); Left Hand <1 day    Peripheral IV 02/19/23 Right Antecubital <1 day Telemetry:  Telemetry Orders (From admission, onward)             48 Hour Telemetry Monitoring  Continuous x 48 hours        References:    Telemetry Guidelines   Question:  Reason for 48 Hour Telemetry  Answer:  Acute Decompensated CHF (continuous diuretic infusion or total diuretic dose > 200 mg daily, associated electrolyte derangement, ionotropic drip, history of ventricular arrhythmia, or new EF <35%)                 Telemetry Reviewed: Normal Sinus Rhythm  Indication for Continued Telemetry Use: Acute CVA             Imaging: No pertinent imaging reviewed  Recent Cultures (last 7 days):         Last 24 Hours Medication List:   Current Facility-Administered Medications   Medication Dose Route Frequency Provider Last Rate   • acetaminophen  975 mg Oral Q8H PRN Prince Uriel MD     • albuterol  2 puff Inhalation Q4H PRN Prince Uriel MD     • atorvastatin  20 mg Oral Daily Prince Uriel MD     • benzonatate  100 mg Oral TID Efraín Hernández MD     • cyanocobalamin  1,000 mcg Intramuscular Daily Efraín Hernández MD      Followed by   • [START ON 2/23/2023] cyanocobalamin  1,000 mcg Oral Daily Efraín Hernández MD     • dextromethorphan-guaiFENesin  10 mL Oral Q4H PRN Efraín Hernández MD     • furosemide  80 mg Intravenous BID (diuretic) Justin Blanco MD     • heparin (porcine)  3-24 Units/kg/hr (Order-Specific) Intravenous Titrated Efraín Hernández MD 15 Units/kg/hr (02/20/23 1628)   • insulin lispro  2-12 Units Subcutaneous TID [facility] Prince Uriel MD     • insulin lispro  2-12 Units Subcutaneous HS Prince Uriel MD     • ondansetron  4 mg Intravenous Q6H PRN Efraín Hernández MD     • sacubitril-valsartan  1 tablet Oral BID Sueellen Runner, MD     • spironolactone  50 mg Oral Daily Prince Uriel MD          Today, Patient Was Seen By: Hung Gama MD    **Please Note: This note may have been constructed using a voice recognition system  **

## 2023-02-20 NOTE — MALNUTRITION/BMI
This medical record reflects one or more clinical indicators suggestive of morbid obesity  BMI Findings:  Adult BMI Classifications: Morbid Obesity 45-49 9        Body mass index is 47 28 kg/m²  See Nutrition note dated 2/20/23 for additional details  Completed nutrition assessment is viewable in the nutrition documentation

## 2023-02-20 NOTE — PHYSICAL THERAPY NOTE
Physical Therapy Evaluation    Patient's Name: Angel Mcfarland      Admitting Diagnosis  Syncope [R55]  Elevated blood pressure reading [R03 0]  Acute exacerbation of CHF (congestive heart failure) ([facility]) [I50 9]  DAVID (acute kidney injury) ([facility] ) [N17 9]  Acute encephalopathy [G93 40]  Elevated lactic acid level [R79 89]    Problem List  Patient Active Problem List   Diagnosis    Cognitive communication deficit    Hypertension    Morbid obesity with body mass index of 40 0-49 9 ([facility] )    Closed fracture of distal end of left tibia    Acute pain due to trauma    Acute on chronic systolic congestive heart failure ([facility])    Type 2 diabetes mellitus ([facility])    Dyslipidemia    UBALDO (obstructive sleep apnea)    S/P ORIF (open reduction internal fixation) fracture    Acute hypoxemic respiratory failure ([facility])    Syncope, seizure like activity    CVA (cerebral vascular accident) ([facility] )    Nonischemic cardiomyopathy ([place] [address] )    Elevated troponin    Acute encephalopathy       Past Medical History  Past Medical History:   Diagnosis Date    Enchondroma of hand bone     last assessed: 4/21/2015    Heart trouble     Hypertension     Palpitations        Past Surgical History  Past Surgical History:   Procedure Laterality Date    EXTERNAL FIXATOR APPLICATION Left 5/25/7311    Procedure: APPLICATION EXTERNAL FIXATION DEVICE LOWER EXTREMITY;  Surgeon: Winter Dominguez MD;  [facility];  Service: Orthopedics    NO PAST SURGERIES      ORIF TIBIA FRACTURE Left 3/3/2022    Procedure: OPEN REDUCTION W/ INTERNAL FIXATION (ORIF) LEFT TIBIA PILON FRACTURE, REMOVAL OF EXTERNAL FIXATOR;  Surgeon: Winter Dominguez MD;  [facility];  Service: Orthopedics        02/20/23 0951   PT Last Visit   PT Visit Date 02/20/23   Note Type   Note type Evaluation   Pain Assessment   Pain Assessment Tool 0-10   Pain Score No Pain   Restrictions/Precautions   Weight Bearing Precautions Per Order No   Other Precautions Telemetry   Home Living Type of Home House  (bilevel)   Home Layout Two level; Able to live on main level with bedroom/bathroom  (0 KRISTIN)   Bathroom Shower/Tub Tub/shower unit   [employer] Standard   Prior Function   Level of [place] Independent with ADLs; Independent with functional mobility; Independent with IADLS   Lives With   (parents)   [employer] [facility] in the last 6 months 0   Vocational Workers comp   General   Family/Caregiver Present No   Cognition   Overall Cognitive Status WFL   Arousal/Participation Alert   Attention Within functional limits   Orientation Level Oriented X4   Memory Within functional limits   Following Commands Follows all commands and directions without difficulty   Comments pleasant + cooperative   Subjective   Subjective Pt agreeable to mobilize  RLE Assessment   RLE Assessment   (5/5 (limited hip ROM due to body habitus))   LLE Assessment   LLE Assessment   (5/5 (limited hip ROM due to body habitus))   Coordination   Movements are Fluid and Coordinated 1   Heel to Shin Intact   Bed Mobility   Supine to Sit 7  Independent   Sit to Supine Unable to assess   Additional Comments Pt greeted in supine  Transfers   Sit to Stand 7  Independent   Stand to Sit 7  Independent   Additional Comments no AD   Ambulation/Elevation   Gait pattern WNL   Gait Assistance 7  Independent   Assistive Device None   Distance 150'   Balance   Static Sitting Normal   Dynamic Sitting Good   Static Standing Fair +   Dynamic Standing Fair   Ambulatory Fair   Endurance Deficit   Endurance Deficit Yes   Endurance Deficit Description increased work of breathing w/ activity, SpO2 94% and above RA w/ quality readings   Activity Tolerance   Activity Tolerance Patient tolerated treatment well   Medical Staff Made Aware PARKER Farfan   Nurse Made Aware yes - cleared for therapy   Assessment   Assessment Pt is 28 y o  male seen for a moderate complexity PT evaluation s/p admit to [facility] on 2/17/2023   Pt presenting w/ principal dx of syncope, seizure like activity  Please see above for other active problem list / PMH  PT now consulted to assess functional mobility and needs for safe d/c planning  Prior to admission, pt was I and active without AD, lives w/ parents in a house w/ 0 KRISTIN, FFSU  Currently pt is I for bed skills; I for functional transfers; I for ambulation w/o AD  Pt presents near functional baseline  No further skilled acute PT needs  Encouraged pt to continue to ambulate independently while in-house to prevent functional decline  Will d/c from caseload  Goals   Patient Goals go home   PT Treatment Day 0   Plan   PT Frequency   (d/c PT)   Recommendation   PT Discharge Recommendation No rehabilitation needs   AM-PAC Basic Mobility Inpatient   Turning in Flat Bed Without Bedrails 4   Lying on Back to Sitting on Edge of Flat Bed Without Bedrails 4   Moving Bed to Chair 4   Standing Up From Chair Using Arms 4   Walk in Room 4   Climb 3-5 Stairs With Railing 4   Basic Mobility Inpatient Raw Score 24   Basic Mobility Standardized Score 57 68   Highest Level Of Mobility   JH-HLM Goal 8: Walk 250 feet or more   JH-HLM Achieved 7: Walk 25 feet or more   End of Consult   Patient Position at End of Consult Bedside chair; All needs within reach  (all lines in tact)     Mindi Layton, PT, DPT

## 2023-02-20 NOTE — UTILIZATION REVIEW
NOTIFICATION OF INPATIENT ADMISSION   AUTHORIZATION REQUEST   SERVICING FACILITY:   [facility]  Address: [address] [place] [address]  Tax ID: [id number]  NPI: [id number] ATTENDING PROVIDER:  Attending Name and NPI#: Renetta White Md [[id number]]  Address: [address] [place] [address]  Phone: [phone]   ADMISSION INFORMATION:  Place of Service: [place] [address]  Place of Service Code: 21  Inpatient Admission Date/Time: 2/18/23  1:56 AM  Discharge Date/Time: No discharge date for patient encounter  Admitting Diagnosis Code/Description:  Syncope [R55]  Elevated blood pressure reading [R03 0]  Acute exacerbation of CHF (congestive heart failure) ([facility] ) [I50 9]  DAVID (acute kidney injury) ([facility] ) [N17 9]  Acute encephalopathy [G93 40]  Elevated lactic acid level [R79 89]     UTILIZATION REVIEW CONTACT:  Richard Miles Brain, Utilization   Network Utilization Review Department  Phone: [phone]  Fax: [phone]  Email: [email or web address]  Contact for approvals/pending authorizations, clinical reviews, and discharge  PHYSICIAN ADVISORY SERVICES:  Medical Necessity Denial & Xxmd-nl-Ifwm Review  Phone: [phone]  Fax: [phone]  Email: [email or web address]

## 2023-02-20 NOTE — PLAN OF CARE
Problem: MOBILITY - ADULT  Goal: Maintain or return to baseline ADL function  Description: INTERVENTIONS:  -  Assess patient's ability to carry out ADLs; assess patient's baseline for ADL function and identify physical deficits which impact ability to perform ADLs (bathing, care of mouth/teeth, toileting, grooming, dressing, etc )  - Assess/evaluate cause of self-care deficits   - Assess range of motion  - Assess patient's mobility; develop plan if impaired  - Assess patient's need for assistive devices and provide as appropriate  - Encourage maximum independence but intervene and supervise when necessary  - Involve family in performance of ADLs  - Assess for home care needs following discharge   - Consider OT consult to assist with ADL evaluation and planning for discharge  - Provide patient education as appropriate  Outcome: Progressing  Goal: Maintains/Returns to pre admission functional level  Description: INTERVENTIONS:  - Perform BMAT or MOVE assessment daily    - Set and communicate daily mobility goal to care team and patient/family/caregiver  - Collaborate with rehabilitation services on mobility goals if consulted  - Perform Range of Motion 3 times a day  - Reposition patient every 2 hours    - Dangle patient 3 times a day  - Stand patient 3 times a day  - Ambulate patient 3 times a day  - Out of bed to chair 3 times a day   - Out of bed for meals 3 times a day  - Out of bed for toileting  - Record patient progress and toleration of activity level   Outcome: Progressing     Problem: NEUROSENSORY - ADULT  Goal: Achieves stable or improved neurological status  Description: INTERVENTIONS  - Monitor and report changes in neurological status  - Monitor vital signs such as temperature, blood pressure, glucose, and any other labs ordered   - Initiate measures to prevent increased intracranial pressure  - Monitor for seizure activity and implement precautions if appropriate      Outcome: Progressing  Goal: Remains free of injury related to seizures activity  Description: INTERVENTIONS  - Maintain airway, patient safety  and administer oxygen as ordered  - Monitor patient for seizure activity, document and report duration and description of seizure to physician/advanced practitioner  - If seizure occurs,  ensure patient safety during seizure  - Reorient patient post seizure  - Seizure pads on all 4 side rails  - Instruct patient/family to notify RN of any seizure activity including if an aura is experienced  - Instruct patient/family to call for assistance with activity based on nursing assessment  - Administer anti-seizure medications if ordered    Outcome: Progressing  Goal: Achieves maximal functionality and self care  Description: INTERVENTIONS  - Monitor swallowing and airway patency with patient fatigue and changes in neurological status  - Encourage and assist patient to increase activity and self care     - Encourage visually impaired, hearing impaired and aphasic patients to use assistive/communication devices  Outcome: Progressing     Problem: CARDIOVASCULAR - ADULT  Goal: Maintains optimal cardiac output and hemodynamic stability  Description: INTERVENTIONS:  - Monitor I/O, vital signs and rhythm  - Monitor for S/S and trends of decreased cardiac output  - Administer and titrate ordered vasoactive medications to optimize hemodynamic stability  - Assess quality of pulses, skin color and temperature  - Assess for signs of decreased coronary artery perfusion  - Instruct patient to report change in severity of symptoms  Outcome: Progressing  Goal: Absence of cardiac dysrhythmias or at baseline rhythm  Description: INTERVENTIONS:  - Continuous cardiac monitoring, vital signs, obtain 12 lead EKG if ordered  - Administer antiarrhythmic and heart rate control medications as ordered  - Monitor electrolytes and administer replacement therapy as ordered  Outcome: Progressing     Problem: RESPIRATORY - ADULT  Goal: Achieves optimal ventilation and oxygenation  Description: INTERVENTIONS:  - Assess for changes in respiratory status  - Assess for changes in mentation and behavior  - Position to facilitate oxygenation and minimize respiratory effort  - Oxygen administered by appropriate delivery if ordered  - Initiate smoking cessation education as indicated  - Encourage broncho-pulmonary hygiene including cough, deep breathe, Incentive Spirometry  - Assess the need for suctioning and aspirate as needed  - Assess and instruct to report SOB or any respiratory difficulty  - Respiratory Therapy support as indicated  Outcome: Progressing     Problem: METABOLIC, FLUID AND ELECTROLYTES - ADULT  Goal: Electrolytes maintained within normal limits  Description: INTERVENTIONS:  - Monitor labs and assess patient for signs and symptoms of electrolyte imbalances  - Administer electrolyte replacement as ordered  - Monitor response to electrolyte replacements, including repeat lab results as appropriate  - Instruct patient on fluid and nutrition as appropriate  Outcome: Progressing  Goal: Fluid balance maintained  Description: INTERVENTIONS:  - Monitor labs   - Monitor I/O and WT  - Instruct patient on fluid and nutrition as appropriate  - Assess for signs & symptoms of volume excess or deficit  Outcome: Progressing  Goal: Glucose maintained within target range  Description: INTERVENTIONS:  - Monitor Blood Glucose as ordered  - Assess for signs and symptoms of hyperglycemia and hypoglycemia  - Administer ordered medications to maintain glucose within target range  - Assess nutritional intake and initiate nutrition service referral as needed  Outcome: Progressing     Problem: Prexisting or High Potential for Compromised Skin Integrity  Goal: Skin integrity is maintained or improved  Description: INTERVENTIONS:  - Identify patients at risk for skin breakdown  - Assess and monitor skin integrity  - Assess and monitor nutrition and hydration status  - Monitor labs   - Assess for incontinence   - Turn and reposition patient  - Assist with mobility/ambulation  - Relieve pressure over bony prominences  - Avoid friction and shearing  - Provide appropriate hygiene as needed including keeping skin clean and dry  - Evaluate need for skin moisturizer/barrier cream  - Collaborate with interdisciplinary team   - Patient/family teaching  - Consider wound care consult   Outcome: Progressing

## 2023-02-20 NOTE — SPEECH THERAPY NOTE
Attempted to see patient x2 for language evaluation  Patient off the floor and then with neurology  Will f/u for evaluation as able

## 2023-02-20 NOTE — ASSESSMENT & PLAN NOTE
Patient with history of nonischemic cardiomyopathy (EF 20-25%)  Patient follows with cardiology at [facility] as outpatient  He is status post AICD placement November 11/22      Plan:  Echo with finding of LV thrombus, will continue IV heparin  Continue telemetry  Cardiology consulted, appreciate recommendations  Interrogate AICD

## 2023-02-20 NOTE — ASSESSMENT & PLAN NOTE
Patient with altered mental status following syncopal/seizure-like episode during which time patient was nonverbal but following commands  Patient now back to baseline mental status per mother at bedside  Plan:   For CTA of head and neck today  Awiating MRI brain  Neurology following

## 2023-02-20 NOTE — ASSESSMENT & PLAN NOTE
Wt Readings from Last 3 Encounters:   02/20/23 (!) 154 kg (339 lb)   02/19/23 (!) 156 kg (343 lb 14 7 oz)   02/15/23 (!) 154 kg (340 lb)     Pt presented with elevated , crackles and LE edema on exam, increased 02 requirment on bipap overnight  Given Lasix 80 IV in ED  CXR with pulmonary edema  Reviewed home medications with patient, however he does not seem certain on which medications he takes, nor does his mother at bedside  He does not have a readily accessible medication list  Per most recent cardiology note from 2/6/23 cardiac medications include: atorvastatin 20 mg daily, carvedilol 25 mg twice daily, Ivabradine, spironolactone 50 mg daily, torsemide 80 mg daily, Entresto 75 to 103 mg twice daily  Plan:  Continue diuresis  Resumed patient's carvedilol 25 mg twice daily and spironolactone     Cardiology consulted, appreciated recommendations  Strict intake and output  Follow-up BMP

## 2023-02-20 NOTE — QUICK NOTE
Discussed the transfer process with the patient's mother and with the transfer center  Per PACs the cost of transfer will be $1470, 50% of which needs to be paid to [facility] up front  I discussed this with Jesusita Homans and she became upset and stated that she needs her son transferred to [facility] and she will sign him out AMA if she has to  I notified PACs that we should proceed with her requested transfer and she was willing to try to work out payment with [facility] for this non medically necessary transfer

## 2023-02-20 NOTE — UTILIZATION REVIEW
Initial Clinical Review      Request for transfer :    NPI for [facility] is [id number]  Dr Guanako Patel is the accepting physician NPI is [id number]  Admission: Date/Time/Statement:   Admission Orders (From admission, onward)     Ordered        02/18/23 0156  INPATIENT ADMISSION  Once                      Orders Placed This Encounter   Procedures   • INPATIENT ADMISSION     Standing Status:   Standing     Number of Occurrences:   1     Order Specific Question:   Level of Care     Answer:   Level 1 Stepdown [13]     Order Specific Question:   Estimated length of stay     Answer:   More than 2 Midnights     Order Specific Question:   Certification     Answer:   I certify that inpatient services are medically necessary for this patient for a duration of greater than two midnights  See H&P and MD Progress Notes for additional information about the patient's course of treatment  ED Arrival Information     Expected   -    Arrival   2/17/2023 22:21    Acuity   Urgent            Means of arrival   Ambulance    Escorted by   [facility]    Service   Hospitalist    Admission type   Emergency            Arrival complaint   -           Chief Complaint   Patient presents with   • Seizure - Prior Hx Of     Pt brought in EMS after having a seizure at work and falling       Initial Presentation: 28 y o  male presents to ed from work  via ems for evaluation and treatment of seizure and fall at work  Mother states he was out with friends when he suddenly collapsed  Lajean Cassette PMHX: ICD, cardiomyopathy  Ef 35%, DM2, HTN, UBALDO, CARDIAC ARREST, Not on home O2       Clinical assessment significant for GCS=13,  wheezing and bilateral lower extremity edema, hypoxia, bilateral rales,  non verbal but following commands  Salicylate <3, acetaminophen <2, troponin 111 / 141, LA 2 8, , EKG NSR  Initially treated with bipap, iv lasix x2    Duo neb and albuterol given by ems prior to arrival   Admit to inpatient med surg for syncope with seizure like activity  Plan includes telemetry, echo, bipap wean to ra, neuro checks, possible LP or MRI is not improving, consult cardiology, consult neurology       Consult cardiology completed: echo shows ef 15 to 20%  LV thrombus  Volume overload  Plan iv lasix 80 mg bid  Hold chloe due to low blood pressure  ICD interrogation with no events  Heparin gtt  Recommend seizure work up  Date: 2- 19-23 Day 2: inpatient med surg   Neurology consult completed for possible CVA from LV thrombus  CT head repeated shows L hemispheric infarct I L corpus stratum / caudate head, putamen, internal capsule  Patient's mother reports recent lethargy which may indicate seizures  New medication Ozempic  Plan routine EEG  Ozempic discontinued  Mental status back to baseline  GCS =15  MRI is not compatible with ICD  Continue  IV heparin gtt  due to concern for LV thrombus and continue telemetry and neuro checks  Bipap weaned to 4L O2nc ( not baseline)  Continue iv lasix 80 mg bid  CTA head and neck ordered to rule out hemorrhagic conversion  Obtain PT/OT evaluations  Pt's mother is requesting a transfer to [facility] for continuity of care      The patient will continue to require additional inpatient hospital stay due to CTA, EEG, IV heparin    ED Triage Vitals   02/18/23 0154 02/17/23 2230 02/17/23 2230 02/17/23 2243 02/17/23 2230   98 1 °F (36 7 °C) (!) 115 22 161/85 98 %      Oral Monitor           No Pain          02/20/23 (!) 154 kg (339 lb)     Additional Vital Signs:       Date/  Time Temp Pulse Resp BP MAP SpO2 Nasal Cannula O2 Flow Rate  O2 Device   02/20/23 0824 98 7 °F (37 1 °C) 100 26   Abnormal  124/88 97 94 % -- None (Room air)   02/20/23 0621 -- 90 -- 114/73 87 91 % -- --   02/20/23 0034 -- -- -- -- -- 98 % 6 L/min Nasal cannula   02/19/23 2233 98 6 °F (37 °C) -- -- -- -- -- -- --   02/19/23 2230 -- 92 -- 102/59 79 96 % -- --   02/19/23 1900 -- 82 -- 96/47   Abnormal  65 81 % Abnormal  -- --   02/19/23 1456 97 4 °F (36 3 °C)   Abnormal  -- -- -- -- -- -- --   02/19/23 0830 -- 90 -- 127/60 83 87 % Abnormal  -- --   02/19/23 0700 97 6 °F (36 4 °C) -- -- -- -- -- -- --   02/19/23 0328 97 6 °F (36 4 °C) 84 24   Abnormal  112/54 75 95 % 4 L/min Nasal cannula   02/18/23 2300 98 °F (36 7 °C) 90 22 103/74 87 96 % 4 L/min Nasal cannula   02/18/23 2233 -- -- -- -- -- 94 % -- --   02/18/23 1922 -- 92 24   Abnormal  96/57 78 96 % -- None (Room air)   02/18/23 1900 98 °F (36 7 °C) -- -- -- -- -- -- --   02/18/23 1530 97 5 °F (36 4 °C) 98 -- 130/72 96 94 % -- --   02/18/23 1250 -- 80 -- -- -- 78 % Abnormal  -- --   02/18/23 1120 -- 84 -- 119/93 111 93 % -- --   02/18/23 0950 -- 106   Abnormal  -- -- -- 100 % -- --   02/18/23 0716 -- -- -- -- -- -- 4 L/min Nasal cannula   02/18/23 0620 -- 84 28   Abnormal  105/60 76 98 % -- BiPAP   02/18/23 0335 98 3 °F (36 8 °C) 86 29   Abnormal  115/65 84 99 % -- BiPAP   02/18/23 0154 98 1 °F (36 7 °C) -- -- -- -- -- -- --     Date and Time Eye Opening Best Verbal Response Best Motor Response Ryan Coma Scale Score   02/19/23 2322 4 5 6 15   02/19/23 2000 4 5 6 15   02/19/23 1600 4 5 6 15   02/19/23 1200 4 5 6 15   02/19/23 0800 4 5 6 15   02/19/23 0000 4 5 6 15   02/18/23 2000 4 5 6 15   02/18/23 1600 3 1 6 10   02/18/23 1200 3 1 6 10   02/18/23 0800 3 1 6 10   02/18/23 0400 4 4 6 14   02/17/23 2235 4 4 6 14   02/17/23 2230 3 4 6 13         Pertinent Labs/Diagnostic Test Results:        Echocardiogram 2/18/23  LVEF: 15-20%, my read  LVIDd: 7 7 cm  RV: not well visualized, appears dilated, mildly reduced systolic function  MR: mild  PASP: 53mmHg  RVOT: no notching  Other: restrictive diastology, LV apical thrombus     Fri Feb 17, 2023   2300 ECG per my independent interpretation: Normal rate, regular rhythm, no ectopy, normal axis, no ST elevations or depressions         CT head wo contrast   Final (02/19 1352)      Evolving acute left corpus striatum infarct with mild associated mass effect due to cytotoxic edema without evidence for hemorrhage  CT head without contrast   Final  (02/18 0245)      No acute intracranial hemorrhage, midline shift, or mass effect  XR chest 1 view portable   Final  (02/18 1138)      Given elevated BNP, probable pulmonary edema but evaluation is compromised by body habitus          Results from last 7 days   Lab Units 02/18/23  0045   SARS-COV-2  Negative     Results from last 7 days   Lab Units 02/19/23  0702 02/18/23  0509 02/17/23  2310   WBC Thousand/uL 6 47 7 72 7 72   HEMOGLOBIN g/dL 13 7 14 0 14 2   HEMATOCRIT % 46 7 46 5 45 2   PLATELETS Thousands/uL 267 273 253   NEUTROS ABS Thousands/µL 3 42 5 16 4 96         Results from last 7 days   Lab Units 02/20/23  0632 02/19/23  0659 02/18/23  0509 02/17/23  2310   SODIUM mmol/L 137 136 137 134*   POTASSIUM mmol/L 3 6 3 9 4 4 3 6   CHLORIDE mmol/L 102 104 104 102   CO2 mmol/L 28 27 25 25   ANION GAP mmol/L 7 5 8 7   BUN mg/dL 17 15 16 18   CREATININE mg/dL 1 32* 1 28 1 29 1 52*   EGFR ml/min/1 73sq m 72 75 74 61   CALCIUM mg/dL 9 2 9 4 8 9 9 1   MAGNESIUM mg/dL 2 7* 2 8* 2 6  --    PHOSPHORUS mg/dL  --  3 5 4 5  --      Results from last 7 days   Lab Units 02/20/23  0632 02/19/23  1400 02/17/23  2310   AST U/L  --  44 31   ALT U/L  --  21 22   ALK PHOS U/L  --  67 83   TOTAL PROTEIN g/dL  --  7 9 7 7   ALBUMIN g/dL  --  2 9* 2 9*   TOTAL BILIRUBIN mg/dL  --  0 63 0 48   BILIRUBIN DIRECT mg/dL  --  0 23*  --    AMMONIA umol/L 24  --   --      Results from last 7 days   Lab Units 02/20/23  0633 02/19/23  2046 02/19/23  1552 02/19/23  1104 02/19/23  0642 02/18/23  2126 02/18/23  1600 02/18/23  1134 02/18/23  0725 02/18/23  0557 02/17/23  2229   POC GLUCOSE mg/dl 94 149* 155* 141* 119 131 102 117 111 114 171*     Results from last 7 days   Lab Units 02/20/23  0632 02/19/23  0659 02/18/23  0509 02/17/23  2310   GLUCOSE RANDOM mg/dL 131 114 120 187*       Results from last 7 days   Lab Units 02/19/23  0703 02/18/23  0818 02/17/23  2310   PH JAVI  7 433* 7 333 7 389   PCO2 JAVI mm Hg 47 9 71 7* 49 1   PO2 JAVI mm Hg 67 5* 38 8 197 8*   HCO3 JAVI mmol/L 31 3* 37 2* 29 0   BASE EXC JAVI mmol/L 5 9 8 3 3 0   O2 CONTENT JAVI ml/dL 19 1 14 1 22 8   O2 HGB, VENOUS % 91 1* 67 4 97 9*             Results from last 7 days   Lab Units 02/18/23  1555 02/18/23  0818 02/18/23  0509 02/18/23  0046 02/17/23  2310   HS TNI 0HR ng/L  --  171*  --   --  111*   HS TNI 2HR ng/L 145*  --   --  141*  --    HSTNI D2 ng/L -26  --   --  30*  --    HS TNI 4HR ng/L  --   --  151*  --   --    HSTNI D4 ng/L  --   --  40*  --   --          Results from last 7 days   Lab Units 02/20/23  0121 02/19/23  2056 02/19/23  1400   PROTIME seconds  --   --  13 3   INR   --   --  0 99   PTT seconds 58* 58* 26     Results from last 7 days   Lab Units 02/20/23  0632   TSH 3RD GENERATON uIU/mL 4 270         Results from last 7 days   Lab Units 02/18/23  0240 02/17/23  2310   LACTIC ACID mmol/L 1 6 2 8*       Results from last 7 days   Lab Units 02/17/23  2310   NT-PRO BNP pg/mL 903*       Results from last 7 days   Lab Units 02/19/23  1400   LIPASE u/L 172     Results from last 7 days   Lab Units 02/18/23  0045   INFLUENZA A PCR  Negative   INFLUENZA B PCR  Negative   RSV PCR  Negative         Results from last 7 days   Lab Units 02/18/23  0034   AMPH/METH  Negative   BARBITURATE UR  Negative   BENZODIAZEPINE UR  Negative   COCAINE UR  Negative   METHADONE URINE  Negative   OPIATE UR  Negative   PCP UR  Negative   THC UR  Negative     Results from last 7 days   Lab Units 02/18/23  0046   ETHANOL LVL mg/dL <3   ACETAMINOPHEN LVL ug/mL <2*   SALICYLATE LVL mg/dL <3*     ED Treatment:   Medication Administration from 02/17/2023 2221 to 02/18/2023 0329       Date/Time Order Dose Route Action     02/17/2023 2349 EST ipratropium-albuterol (FOR EMS ONLY) (DUO-NEB) 0 5-2 5 mg/3 mL inhalation solution 3 mL   Given to EMS     02/17/2023 2349 EST albuterol (FOR EMS ONLY) (2 5 mg/3 mL) 0 083 % inhalation solution 2 5 mg   Given to EMS     02/17/2023 2317 EST furosemide (LASIX) injection 40 mg 40 mg Intravenous Given     02/17/2023 2317 EST furosemide (LASIX) injection 40 mg 40 mg Intravenous Given        Past Medical History:   Diagnosis Date   • Enchondroma of hand bone     last assessed: 4/21/2015   • Heart trouble    • Hypertension    • Palpitations      Present on Admission:  • UBALDO (obstructive sleep apnea)      Admitting Diagnosis:     Syncope [R55]  Elevated blood pressure reading [R03 0]  Acute exacerbation of CHF (congestive heart failure) ([facility]) [I50 9]  DAVID (acute kidney injury) ([facility] [address] ) [N17 9]  Acute encephalopathy [G93 40]  Elevated lactic acid level [R79 89]    Age/Sex: 28 y o  male    Scheduled Medications:    atorvastatin, 20 mg, Oral, Daily  cyanocobalamin, 1,000 mcg, Intramuscular, Daily   Followed by  Avery Maldonado ON 2/23/2023] cyanocobalamin, 1,000 mcg, Oral, Daily  furosemide, 80 mg, Intravenous, BID (diuretic)  insulin lispro, 2-12 Units, Subcutaneous, TID AC  insulin lispro, 2-12 Units, Subcutaneous, HS  sacubitril-valsartan, 1 tablet, Oral, BID  spironolactone, 50 mg, Oral, Daily      Continuous IV Infusions:  heparin (porcine), 3-24 Units/kg/hr (Order-Specific), Intravenous, Titrated      PRN Meds:  acetaminophen, 975 mg, Oral, Q8H PRN  albuterol, 2 puff, Inhalation, Q4H PRN  dextromethorphan-guaiFENesin, 10 mL, Oral, Q4H PRN  ondansetron, 4 mg, Intravenous, Q6H PRN        IP CONSULT TO CASE MANAGEMENT  IP CONSULT TO HEART FAILURE SERVICE  IP CONSULT TO NEUROLOGY    Network Utilization Review Department  ATTENTION: Please call with any questions or concerns to [phone] and carefully listen to the prompts so that you are directed to the right person   All voicemails are confidential   Candia Siemens all requests for admission clinical reviews, approved or denied determinations and any other requests to dedicated fax number below belonging to the campus where the patient is receiving treatment   List of dedicated fax numbers for the Facilities:  [address] [place] [address] DENIALS (Administrative/Medical Necessity) [phone]   [address]  (Maternity/NICU/Pediatrics) [phone]   [address] [phone]   [address] [phone]   [address] [address] [facility] [address] [phone]   [address] [facility] [address] maria [facility] [address] [facility] [phone]

## 2023-02-20 NOTE — CASE MANAGEMENT
Ar[address] 49 received an authorization request for a hospital to hospital transfer  Requested by: Rachele King  Date of transfer: 2/20  Start Location: [facility]  NPI: [id number]  Ordering Dr: Silvia Dotson  NPI: [id number]  End Location:  [facility]  NPI:  [id number]   Accepting Dr:  Dr Lewis Perez   NPI: [id number]   Reason for transfer/diagnosis: patient preference  Authorizatoin initiated by contacting insurance: Rachel Zazueta  Via: Fax  Clinicals submitted via:  Fax
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
## 2023-02-20 NOTE — ASSESSMENT & PLAN NOTE
Patient with witnessed episode of syncope/seizure-like activity while out with friends 2/17, denies hx of seizures or similar episodes  Pt nonverbal, not following commands post event, progressively more alert throughout the day 2/18  Plan:  · Cardiology consulted for AICD interrogation and echo    · Preliminary echo finding of LV thrombus discussed with Dr Lakia Greenberg  · Possible CVA from LV thrombus causing his initial presentation  · Repeat CT scan of head is positive for CVA  · Check CTA of head and neck  · MRI is pending  · EEG pending

## 2023-02-21 ENCOUNTER — APPOINTMENT (INPATIENT)
Dept: RADIOLOGY | Facility: HOSPITAL | Age: 28
End: 2023-02-21

## 2023-02-21 ENCOUNTER — APPOINTMENT (INPATIENT)
Dept: RADIOLOGY | Facility: HOSPITAL | Age: 28
End: 2023-02-21
Attending: INTERNAL MEDICINE

## 2023-02-21 VITALS
OXYGEN SATURATION: 97 % | RESPIRATION RATE: 20 BRPM | WEIGHT: 315 LBS | BODY MASS INDEX: 45.1 KG/M2 | HEART RATE: 94 BPM | HEIGHT: 70 IN | DIASTOLIC BLOOD PRESSURE: 66 MMHG | TEMPERATURE: 97.7 F | SYSTOLIC BLOOD PRESSURE: 124 MMHG

## 2023-02-21 LAB
ALBUMIN SERPL BCP-MCNC: 3.2 G/DL (ref 3.5–5)
ALP SERPL-CCNC: 71 U/L (ref 46–116)
ALT SERPL W P-5'-P-CCNC: 24 U/L (ref 12–78)
ANION GAP SERPL CALCULATED.3IONS-SCNC: 5 MMOL/L (ref 4–13)
ANION GAP SERPL CALCULATED.3IONS-SCNC: 6 MMOL/L (ref 4–13)
APTT PPP: 52 SECONDS (ref 23–37)
ARTERIAL PATENCY WRIST A: YES
AST SERPL W P-5'-P-CCNC: 44 U/L (ref 5–45)
ATRIAL RATE: 102 BPM
ATRIAL RATE: 84 BPM
ATRIAL RATE: 85 BPM
ATRIAL RATE: 91 BPM
ATRIAL RATE: 96 BPM
BASE EXCESS BLDA CALC-SCNC: 3 MMOL/L
BASOPHILS # BLD AUTO: 0.04 THOUSANDS/ÂΜL (ref 0–0.1)
BASOPHILS NFR BLD AUTO: 1 % (ref 0–1)
BILIRUB SERPL-MCNC: 0.56 MG/DL (ref 0.2–1)
BUN SERPL-MCNC: 15 MG/DL (ref 5–25)
BUN SERPL-MCNC: 18 MG/DL (ref 5–25)
CALCIUM ALBUM COR SERPL-MCNC: 9.9 MG/DL (ref 8.3–10.1)
CALCIUM SERPL-MCNC: 8.7 MG/DL (ref 8.3–10.1)
CALCIUM SERPL-MCNC: 9.3 MG/DL (ref 8.3–10.1)
CHLORIDE SERPL-SCNC: 102 MMOL/L (ref 96–108)
CHLORIDE SERPL-SCNC: 104 MMOL/L (ref 96–108)
CO2 SERPL-SCNC: 26 MMOL/L (ref 21–32)
CO2 SERPL-SCNC: 28 MMOL/L (ref 21–32)
CREAT SERPL-MCNC: 1.15 MG/DL (ref 0.6–1.3)
CREAT SERPL-MCNC: 1.28 MG/DL (ref 0.6–1.3)
EOSINOPHIL # BLD AUTO: 0.18 THOUSAND/ÂΜL (ref 0–0.61)
EOSINOPHIL NFR BLD AUTO: 3 % (ref 0–6)
ERYTHROCYTE [DISTWIDTH] IN BLOOD BY AUTOMATED COUNT: 15 % (ref 11.6–15.1)
GFR SERPL CREATININE-BSD FRML MDRD: 75 ML/MIN/1.73SQ M
GFR SERPL CREATININE-BSD FRML MDRD: 86 ML/MIN/1.73SQ M
GLUCOSE SERPL-MCNC: 119 MG/DL (ref 65–140)
GLUCOSE SERPL-MCNC: 125 MG/DL (ref 65–140)
GLUCOSE SERPL-MCNC: 125 MG/DL (ref 65–140)
GLUCOSE SERPL-MCNC: 147 MG/DL (ref 65–140)
GLUCOSE SERPL-MCNC: 236 MG/DL (ref 65–140)
HCO3 BLDA-SCNC: 27 MMOL/L (ref 22–28)
HCT VFR BLD AUTO: 48.3 % (ref 36.5–49.3)
HGB BLD-MCNC: 15.3 G/DL (ref 12–17)
IMM GRANULOCYTES # BLD AUTO: 0.02 THOUSAND/UL (ref 0–0.2)
IMM GRANULOCYTES NFR BLD AUTO: 0 % (ref 0–2)
LACTATE SERPL-SCNC: 1.3 MMOL/L (ref 0.5–2)
LYMPHOCYTES # BLD AUTO: 1.53 THOUSANDS/ÂΜL (ref 0.6–4.47)
LYMPHOCYTES NFR BLD AUTO: 23 % (ref 14–44)
MCH RBC QN AUTO: 27.9 PG (ref 26.8–34.3)
MCHC RBC AUTO-ENTMCNC: 31.7 G/DL (ref 31.4–37.4)
MCV RBC AUTO: 88 FL (ref 82–98)
MONOCYTES # BLD AUTO: 0.7 THOUSAND/ÂΜL (ref 0.17–1.22)
MONOCYTES NFR BLD AUTO: 11 % (ref 4–12)
NEUTROPHILS # BLD AUTO: 4.08 THOUSANDS/ÂΜL (ref 1.85–7.62)
NEUTS SEG NFR BLD AUTO: 62 % (ref 43–75)
NON VENT ROOM AIR: 21 %
NRBC BLD AUTO-RTO: 0 /100 WBCS
O2 CT BLDA-SCNC: 21.2 ML/DL (ref 16–23)
OXYHGB MFR BLDA: 89.9 % (ref 94–97)
P AXIS: 126 DEGREES
P AXIS: 51 DEGREES
P AXIS: 54 DEGREES
P AXIS: 59 DEGREES
P AXIS: 67 DEGREES
PCO2 BLDA: 39.3 MM HG (ref 36–44)
PH BLDA: 7.46 [PH] (ref 7.35–7.45)
PLATELET # BLD AUTO: 274 THOUSANDS/UL (ref 149–390)
PMV BLD AUTO: 10.5 FL (ref 8.9–12.7)
PO2 BLDA: 61.4 MM HG (ref 75–129)
POTASSIUM SERPL-SCNC: 3.9 MMOL/L (ref 3.5–5.3)
POTASSIUM SERPL-SCNC: 4.2 MMOL/L (ref 3.5–5.3)
PR INTERVAL: 179 MS
PR INTERVAL: 183 MS
PR INTERVAL: 184 MS
PR INTERVAL: 192 MS
PR INTERVAL: 196 MS
PROT SERPL-MCNC: 8.6 G/DL (ref 6.4–8.4)
QRS AXIS: -11 DEGREES
QRS AXIS: 17 DEGREES
QRS AXIS: 33 DEGREES
QRS AXIS: 75 DEGREES
QRS AXIS: 95 DEGREES
QRSD INTERVAL: 100 MS
QRSD INTERVAL: 100 MS
QRSD INTERVAL: 104 MS
QRSD INTERVAL: 104 MS
QRSD INTERVAL: 108 MS
QT INTERVAL: 334 MS
QT INTERVAL: 358 MS
QT INTERVAL: 363 MS
QT INTERVAL: 367 MS
QT INTERVAL: 368 MS
QTC INTERVAL: 432 MS
QTC INTERVAL: 434 MS
QTC INTERVAL: 435 MS
QTC INTERVAL: 452 MS
QTC INTERVAL: 453 MS
RBC # BLD AUTO: 5.48 MILLION/UL (ref 3.88–5.62)
SODIUM SERPL-SCNC: 135 MMOL/L (ref 135–147)
SODIUM SERPL-SCNC: 136 MMOL/L (ref 135–147)
SPECIMEN SOURCE: ABNORMAL
T WAVE AXIS: -78 DEGREES
T WAVE AXIS: 111 DEGREES
T WAVE AXIS: 120 DEGREES
T WAVE AXIS: 193 DEGREES
T WAVE AXIS: 201 DEGREES
VENTRICULAR RATE: 102 BPM
VENTRICULAR RATE: 84 BPM
VENTRICULAR RATE: 85 BPM
VENTRICULAR RATE: 91 BPM
VENTRICULAR RATE: 96 BPM
WBC # BLD AUTO: 6.55 THOUSAND/UL (ref 4.31–10.16)

## 2023-02-21 RX ORDER — POTASSIUM CHLORIDE 20 MEQ/1
40 TABLET, EXTENDED RELEASE ORAL ONCE
Status: COMPLETED | OUTPATIENT
Start: 2023-02-21 | End: 2023-02-21

## 2023-02-21 RX ORDER — BENZONATATE 100 MG/1
100 CAPSULE ORAL 3 TIMES DAILY
Qty: 20 CAPSULE | Refills: 0
Start: 2023-02-21

## 2023-02-21 RX ORDER — HEPARIN SODIUM 10000 [USP'U]/100ML
3-24 INJECTION, SOLUTION INTRAVENOUS
Refills: 0
Start: 2023-02-21

## 2023-02-21 RX ORDER — BUMETANIDE 0.25 MG/ML
0.5 INJECTION INTRAMUSCULAR; INTRAVENOUS CONTINUOUS
Refills: 0
Start: 2023-02-21

## 2023-02-21 RX ORDER — ALBUTEROL SULFATE 90 UG/1
2 AEROSOL, METERED RESPIRATORY (INHALATION) EVERY 4 HOURS PRN
Refills: 0
Start: 2023-02-21

## 2023-02-21 RX ORDER — SPIRONOLACTONE 50 MG/1
50 TABLET, FILM COATED ORAL DAILY
Refills: 0
Start: 2023-02-22

## 2023-02-21 RX ORDER — CARVEDILOL 3.12 MG/1
3.12 TABLET ORAL 2 TIMES DAILY WITH MEALS
Refills: 0
Start: 2023-02-21

## 2023-02-21 RX ORDER — CYANOCOBALAMIN 1000 UG/ML
1000 INJECTION, SOLUTION INTRAMUSCULAR; SUBCUTANEOUS DAILY
Qty: 1 ML | Refills: 0
Start: 2023-02-22 | End: 2023-02-24

## 2023-02-21 RX ORDER — HYDROCODONE BITARTRATE AND HOMATROPINE METHYLBROMIDE ORAL SOLUTION 5; 1.5 MG/5ML; MG/5ML
5 LIQUID ORAL EVERY 4 HOURS PRN
Refills: 0
Start: 2023-02-21 | End: 2023-03-03

## 2023-02-21 RX ORDER — INSULIN LISPRO 100 [IU]/ML
2-12 INJECTION, SOLUTION INTRAVENOUS; SUBCUTANEOUS
Refills: 0
Start: 2023-02-21

## 2023-02-21 RX ORDER — ONDANSETRON 2 MG/ML
4 INJECTION INTRAMUSCULAR; INTRAVENOUS EVERY 6 HOURS PRN
Refills: 0
Start: 2023-02-21

## 2023-02-21 RX ADMIN — SACUBITRIL AND VALSARTAN 1 TABLET: 49; 51 TABLET, FILM COATED ORAL at 09:51

## 2023-02-21 RX ADMIN — ATORVASTATIN CALCIUM 20 MG: 20 TABLET, FILM COATED ORAL at 09:51

## 2023-02-21 RX ADMIN — HEPARIN SODIUM 15 UNITS/KG/HR: 10000 INJECTION, SOLUTION INTRAVENOUS at 06:22

## 2023-02-21 RX ADMIN — CARVEDILOL 3.12 MG: 3.12 TABLET, FILM COATED ORAL at 09:51

## 2023-02-21 RX ADMIN — BENZONATATE 100 MG: 100 CAPSULE ORAL at 09:51

## 2023-02-21 RX ADMIN — SPIRONOLACTONE 50 MG: 50 TABLET ORAL at 09:51

## 2023-02-21 RX ADMIN — POTASSIUM CHLORIDE 40 MEQ: 1500 TABLET, EXTENDED RELEASE ORAL at 04:49

## 2023-02-21 NOTE — PLAN OF CARE
Problem: MOBILITY - ADULT  Goal: Maintain or return to baseline ADL function  Description: INTERVENTIONS:  -  Assess patient's ability to carry out ADLs; assess patient's baseline for ADL function and identify physical deficits which impact ability to perform ADLs (bathing, care of mouth/teeth, toileting, grooming, dressing, etc )  - Assess/evaluate cause of self-care deficits   - Assess range of motion  - Assess patient's mobility; develop plan if impaired  - Assess patient's need for assistive devices and provide as appropriate  - Encourage maximum independence but intervene and supervise when necessary  - Involve family in performance of ADLs  - Assess for home care needs following discharge   - Consider OT consult to assist with ADL evaluation and planning for discharge  - Provide patient education as appropriate  Outcome: Progressing  Goal: Maintains/Returns to pre admission functional level  Description: INTERVENTIONS:  - Perform BMAT or MOVE assessment daily    - Set and communicate daily mobility goal to care team and patient/family/caregiver  - Collaborate with rehabilitation services on mobility goals if consulted  - Perform Range of Motion  times a day  - Reposition patient every  hours    - Dangle patient  times a day  - Stand patient  times a day  - Ambulate patient  times a day  - Out of bed to chair  times a day   - Out of bed for prudencio times a day  - Out of bed for toileting  - Record patient progress and toleration of activity level   Outcome: Progressing     Problem: NEUROSENSORY - ADULT  Goal: Achieves stable or improved neurological status  Description: INTERVENTIONS  - Monitor and report changes in neurological status  - Monitor vital signs such as temperature, blood pressure, glucose, and any other labs ordered   - Initiate measures to prevent increased intracranial pressure  - Monitor for seizure activity and implement precautions if appropriate      Outcome: Progressing  Goal: Remains free of injury related to seizures activity  Description: INTERVENTIONS  - Maintain airway, patient safety  and administer oxygen as ordered  - Monitor patient for seizure activity, document and report duration and description of seizure to physician/advanced practitioner  - If seizure occurs,  ensure patient safety during seizure  - Reorient patient post seizure  - Seizure pads on all 4 side rails  - Instruct patient/family to notify RN of any seizure activity including if an aura is experienced  - Instruct patient/family to call for assistance with activity based on nursing assessment  - Administer anti-seizure medications if ordered    Outcome: Progressing  Goal: Achieves maximal functionality and self care  Description: INTERVENTIONS  - Monitor swallowing and airway patency with patient fatigue and changes in neurological status  - Encourage and assist patient to increase activity and self care     - Encourage visually impaired, hearing impaired and aphasic patients to use assistive/communication devices  Outcome: Progressing     Problem: CARDIOVASCULAR - ADULT  Goal: Maintains optimal cardiac output and hemodynamic stability  Description: INTERVENTIONS:  - Monitor I/O, vital signs and rhythm  - Monitor for S/S and trends of decreased cardiac output  - Administer and titrate ordered vasoactive medications to optimize hemodynamic stability  - Assess quality of pulses, skin color and temperature  - Assess for signs of decreased coronary artery perfusion  - Instruct patient to report change in severity of symptoms  Outcome: Progressing  Goal: Absence of cardiac dysrhythmias or at baseline rhythm  Description: INTERVENTIONS:  - Continuous cardiac monitoring, vital signs, obtain 12 lead EKG if ordered  - Administer antiarrhythmic and heart rate control medications as ordered  - Monitor electrolytes and administer replacement therapy as ordered  Outcome: Progressing     Problem: RESPIRATORY - ADULT  Goal: Achieves optimal ventilation and oxygenation  Description: INTERVENTIONS:  - Assess for changes in respiratory status  - Assess for changes in mentation and behavior  - Position to facilitate oxygenation and minimize respiratory effort  - Oxygen administered by appropriate delivery if ordered  - Initiate smoking cessation education as indicated  - Encourage broncho-pulmonary hygiene including cough, deep breathe, Incentive Spirometry  - Assess the need for suctioning and aspirate as needed  - Assess and instruct to report SOB or any respiratory difficulty  - Respiratory Therapy support as indicated  Outcome: Progressing     Problem: METABOLIC, FLUID AND ELECTROLYTES - ADULT  Goal: Electrolytes maintained within normal limits  Description: INTERVENTIONS:  - Monitor labs and assess patient for signs and symptoms of electrolyte imbalances  - Administer electrolyte replacement as ordered  - Monitor response to electrolyte replacements, including repeat lab results as appropriate  - Instruct patient on fluid and nutrition as appropriate  Outcome: Progressing  Goal: Fluid balance maintained  Description: INTERVENTIONS:  - Monitor labs   - Monitor I/O and WT  - Instruct patient on fluid and nutrition as appropriate  - Assess for signs & symptoms of volume excess or deficit  Outcome: Progressing  Goal: Glucose maintained within target range  Description: INTERVENTIONS:  - Monitor Blood Glucose as ordered  - Assess for signs and symptoms of hyperglycemia and hypoglycemia  - Administer ordered medications to maintain glucose within target range  - Assess nutritional intake and initiate nutrition service referral as needed  Outcome: Progressing     Problem: Prexisting or High Potential for Compromised Skin Integrity  Goal: Skin integrity is maintained or improved  Description: INTERVENTIONS:  - Identify patients at risk for skin breakdown  - Assess and monitor skin integrity  - Assess and monitor nutrition and hydration status  - Monitor labs - Assess for incontinence   - Turn and reposition patient  - Assist with mobility/ambulation  - Relieve pressure over bony prominences  - Avoid friction and shearing  - Provide appropriate hygiene as needed including keeping skin clean and dry  - Evaluate need for skin moisturizer/barrier cream  - Collaborate with interdisciplinary team   - Patient/family teaching  - Consider wound care consult   Outcome: Progressing     Problem: Nutrition/Hydration-ADULT  Goal: Nutrient/Hydration intake appropriate for improving, restoring or maintaining nutritional needs  Description: Monitor and assess patient's nutrition/hydration status for malnutrition  Collaborate with interdisciplinary team and initiate plan and interventions as ordered  Monitor patient's weight and dietary intake as ordered or per policy  Utilize nutrition screening tool and intervene as necessary  Determine patient's food preferences and provide high-protein, high-caloric foods as appropriate       INTERVENTIONS:  - Monitor oral intake, urinary output, labs, and treatment plans  - Assess nutrition and hydration status and recommend course of action  - Evaluate amount of meals eaten  - Assist patient with eating if necessary   - Allow adequate time for meals  - Recommend/ encourage appropriate diets, oral nutritional supplements, and vitamin/mineral supplements  - Order, calculate, and assess calorie counts as needed  - Recommend, monitor, and adjust tube feedings and TPN/PPN based on assessed needs  - Assess need for intravenous fluids  - Provide specific nutrition/hydration education as appropriate  - Include patient/family/caregiver in decisions related to nutrition  Outcome: Progressing
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
## 2023-02-21 NOTE — CASE MANAGEMENT
Case Management Discharge Planning Note    Patient name Stan Rough  Location [facility]-01 MRN [id number]  : 1995 Date 2023       Current Admission Date: 2023  Current Admission Diagnosis:Syncope, seizure like activity   Patient Active Problem List    Diagnosis Date Noted   • Acute encephalopathy    • Acute hypoxemic respiratory failure ([facility] ) 2023   • Syncope, seizure like activity 2023   • CVA (cerebral vascular accident) ([facility] ) 2023   • Nonischemic cardiomyopathy ([facility] ) 2023   • Elevated troponin 2023   • S/P ORIF (open reduction internal fixation) fracture 2022   • Closed fracture of distal end of left tibia 2022   • Acute pain due to trauma 2022   • Acute on chronic systolic congestive heart failure ([facility] ) 2022   • Type 2 diabetes mellitus ([place] [address] ) 2022   • Dyslipidemia 2022   • UBALDO (obstructive sleep apnea) 2022   • Morbid obesity with body mass index of 40 0-49 9 ([facility] ) 2017   • Cognitive communication deficit 2015   • Hypertension 2014      LOS (days): 3  Geometric Mean LOS (GMLOS) (days):   Days to GMLOS:     OBJECTIVE:  Risk of Unplanned Readmission Score: 13 67         Current admission status: Inpatient   Preferred Pharmacy:   [facility] [address], [address] - [address] [address]  Phone: [phone] Fax: [phone]    [id number], [address] - [address]  [address]  [address] [id number]  Phone: [phone] Fax: [phone]    PATIENT/FAMILY REPORTS NO PREFERRED PHARMACY  No address on file      Primary Care Provider: Tabatha Gomez MD    Primary Insurance: Jarred Louis  Secondary Insurance:     DISCHARGE DETAILS:            Additional Comments: Updated mother Kaitlin Pruitt that insurance authorization has been received from [employer], currently waiting for available bed to [facility]    Mother expressed understanding

## 2023-02-21 NOTE — PROGRESS NOTES
Heart Failure Progress note  Unit/Bed#: [facility] 201-01 Encounter: [id number]        Kamaljit Hernández  28 y o  male [id number]  Hospital Stay Days: 3    Assessment and Plan      Current Problem List   Principal Problem:    Syncope, seizure like activity  Active Problems:    Acute on chronic systolic congestive heart failure (HCC)    Type 2 diabetes mellitus (HCC)    UBALDO (obstructive sleep apnea)    Acute hypoxemic respiratory failure (HCC)    CVA (cerebral vascular accident) ([address] )    Nonischemic cardiomyopathy (HCC)    Elevated troponin    Acute encephalopathy    Assessment/Plan:    1  Acute on chronic heart failure with reduced ejection fraction, Stage C, NYHA II Etiology: LV noncompaction  Follows at [facility]  Denies heavy alcohol or drug use  No known family history of heart disease  H/o of admission 4/26-5/2/2022 with acute heart failure exacerbation and cardiogenic shock requiring milrinone that was eventually discontinued  History of clean cardiac cath  TSH normal this admission  · Home meds include Coreg 25 mg BID, entresto 97/103 mg, spironolactone 50 mg daily, on empagliflozin/metformin 12 5-500, and torsemide 80 mg daily but then increased to 100 mg outpatient  · Bumex drip 0 5 an hour - may need to up titrate depending on urine output - repeat bmp later this morning  · SCD: Has ICD - no events on admission - to go for MRI today  · Narrow QRS - precludes CRT  · Cont  Entresto 49/51 here - continue to attempt to uptitrate  · Cont  Coreg  · Cont  Spirolactone  2   LV thrombus - noted new this admission - thought to have cardioembolic stroke  ·  Cont  IV heparin  · To go for MRI today  3  Syncope/collapse  ·  Not completely clear etiology - no ICD shocks  UDS negative  Possibly 2/2 stroke  4  UBALDO  ·  Uses CPAP at home - refusing to wear here  5  Type II DM  6  Acute hypoxic resp failure  ·  Off oxygen at this time  Subjective   Patient seen and examined   Poor urine output overnight - started on bumex drip put out 500 cc an hour after starting bumex drip - slowed down to 0 5 from 1 mg an hour  Net negative 1 8 last 24 hours  3 liters in and 4 8 out  Short run of NSVT overnight  Sinus bobby overnight  To go for MRI tonight  Objective     Vitals: Temp (24hrs), Av 3 °F (36 8 °C), Min:97 9 °F (36 6 °C), Max:98 7 °F (37 1 °C)  Current: Temperature: 98 3 °F (36 8 °C)  Patient Vitals for the past 24 hrs:   BP Temp Temp src Pulse Resp SpO2   23 0430 116/54 -- -- 84 -- (!) 77 %   23 0230 110/51 -- -- 90 -- 96 %   23 1930 100/59 -- -- 94 -- 95 %   23 1900 -- 98 3 °F (36 8 °C) Oral -- -- --   23 1800 106/60 -- -- 100 -- (!) 81 %   23 1527 118/66 97 9 °F (36 6 °C) Oral 90 (!) 24 96 %   23 1230 112/61 -- -- 100 22 95 %   23 0845 -- -- -- -- -- 94 %   23 0824 124/88 98 7 °F (37 1 °C) Oral 100 (!) 26 94 %   23 0800 -- -- -- 78 -- --    Body mass index is 47 28 kg/m²  Physical Exam:  Physical Exam  Constitutional:       Appearance: Normal appearance  Cardiovascular:      Rate and Rhythm: Normal rate and regular rhythm  Comments: Elevated JVP  Pulmonary:      Effort: Pulmonary effort is normal       Breath sounds: Normal breath sounds  No rhonchi  Abdominal:      General: Abdomen is flat  Musculoskeletal:         General: No swelling  Normal range of motion  Right lower leg: Edema present  Left lower leg: Edema present  Neurological:      Mental Status: He is alert  Invasive Devices     Peripheral Intravenous Line  Duration           Peripheral IV 23 Dorsal (posterior); Left Hand 1 day    Peripheral IV 23 Right Antecubital 1 day                    Labs:   Results from last 7 days   Lab Units 23  0702 23  0509 23  2310   WBC Thousand/uL 6 47 7 72 7 72   HEMOGLOBIN g/dL 13 7 14 0 14 2   HEMATOCRIT % 46 7 46 5 45 2   PLATELETS Thousands/uL 267 273 253   NEUTROS PCT % 53 67 64 MONOS PCT % 11 9 9      Results from last 7 days   Lab Units 02/21/23 0446 02/21/23 0152 02/20/23 1643 02/20/23  0632 02/20/23 0121 02/19/23 2056 02/19/23  1400 02/19/23  0659 02/18/23  0509 02/17/23  2310   SODIUM mmol/L  --  135  --  137  --   --   --  136 137 134*   POTASSIUM mmol/L  --  3 9  --  3 6  --   --   --  3 9 4 4 3 6   CHLORIDE mmol/L  --  102  --  102  --   --   --  104 104 102   CO2 mmol/L  --  28  --  28  --   --   --  27 25 25   BUN mg/dL  --  18  --  17  --   --   --  15 16 18   CREATININE mg/dL  --  1 28  --  1 32*  --   --   --  1 28 1 29 1 52*   CALCIUM mg/dL  --  9 3  --  9 2  --   --   --  9 4 8 9 9 1   ALK PHOS U/L  --  71  --   --   --   --  67  --   --  83   ALT U/L  --  24  --   --   --   --  21  --   --  22   AST U/L  --  44  --   --   --   --  44  --   --  31   MAGNESIUM mg/dL  --   --   --  2 7*  --   --   --  2 8* 2 6  --    PHOSPHORUS mg/dL  --   --   --   --   --   --   --  3 5 4 5  --    INR   --   --   --   --   --   --  0 99  --   --   --    PTT seconds 52*  --  60*  --  58* 58* 26  --   --   --    EGFR ml/min/1 73sq m  --  75  --  72  --   --   --  75 74 61     Results from last 7 days   Lab Units 02/21/23 0446 02/20/23 1643 02/20/23 0121 02/19/23 2056 02/19/23  1400   INR   --   --   --   --  0 99   PTT seconds 52* 60* 58* 58* 26     Results from last 7 days   Lab Units 02/21/23 0152   LACTIC ACID mmol/L 1 3         No results found for: PHART, RJV6XPF, PO2ART, OQN1EOR, O0LUOESW, BEART, SOURCE  No components found for: HIV1X2  No results found for: HAV, HEPAIGM, HEPBIGM, HEPBCAB, HBEAG, HEPCAB  No results found for: SPEP, UPEP   Lab Results   Component Value Date    HGBA1C 7 4 (H) 02/20/2023    HGBA1C 6 6 (H) 04/09/2022    HGBA1C 7 3 (H) 07/22/2021     No results found for: CHOL   Lab Results   Component Value Date    HDL 58 02/20/2023      Lab Results   Component Value Date    LDLCALC 78 02/20/2023      Lab Results   Component Value Date    TRIG 99 02/20/2023     No components found for: PROCAL      Micro:      Urinalysis:  Lab Results   Component Value Date    BDZUR Negative 02/18/2023    COCAINEUR Negative 02/18/2023    OPIATEUR Negative 02/18/2023    PCPUR Negative 02/18/2023    THCUR Negative 02/18/2023    ETOH <3 02/18/2023    ACTMNPHEN <2 (L) 97/65/2403    SALICYLATE <3 (L) 40/75/0626          Invalid input(s): URIBILINOGEN        Intake and Outputs:  I/O       02/18 0701  02/19 0700 02/19 0701 02/20 0700    P  O  1370 620    Total Intake(mL/kg) 1370 (8 8) 620 (4)    Urine (mL/kg/hr) 2700 (0 7) 2325 (1)    Emesis/NG output 0     Stool 0     Total Output 2700 2325    Net -1330 -1705          Unmeasured Stool Occurrence 1 x         Nutrition:  Diet Cardiovascular; Cardiac; Sodium 2 GM, Consistent Carbohydrate Diet Level 3 (6 carb servings/90 grams CHO/meal), Fluid Restriction 2000 ML  Radiology Results:   CTA head and neck w wo contrast   Final Result by Nando Schmid MD (02/20 1519)      Evolving left corpus striatum infarct with stable mild local mass effect in the left frontal horn  No evidence for secondary parenchymal hematoma  No evidence for high-grade stenosis, focal occlusion or vascular aneurysm of the cervical or intracranial vessels  Workstation performed: [id number]         XR abdomen 1 vw portable   Final Result by Rosey Valle MD (02/20 1611)      Unremarkable abdomen  Workstation performed: [id number]         CT head wo contrast   Final Result by Frieda Ludwig MD (02/19 1352)      Evolving acute left corpus striatum infarct with mild associated mass effect due to cytotoxic edema without evidence for hemorrhage               I personally discussed this study via [address] [place] [address] with Viv Machado on 2/19/2023 at 1:52 PM                            Workstation performed: [id number]         CT head without contrast   Final Result by Miguelina Dolan MD (02/18 0245)      No acute intracranial hemorrhage, midline shift, or mass effect  Workstation performed: [id number]         XR chest 1 view portable   ED Interpretation by Yannick Hurst MD (02/17 7440)   Cardiomegaly, pulmonary edema      Final Result by Jasmin Sommers MD (02/18 1138)      Given elevated BNP, probable pulmonary edema but evaluation is compromised by body habitus                    Workstation performed: [id number]         MRI brain seizure wo and w contrast    (Results Pending)     Scheduled Medications:  atorvastatin, 20 mg, Daily  benzonatate, 100 mg, TID  carvedilol, 3 125 mg, BID With Meals  cyanocobalamin, 1,000 mcg, Daily   Followed by  Petar Lange ON 2/23/2023] cyanocobalamin, 1,000 mcg, Daily  insulin lispro, 2-12 Units, TID AC  insulin lispro, 2-12 Units, HS  sacubitril-valsartan, 1 tablet, BID  spironolactone, 50 mg, Daily      PRN MEDS:  acetaminophen, 975 mg, Q8H PRN  albuterol, 2 puff, Q4H PRN  HYDROcodone Bit-Homatrop MBr, 5 mL, Q4H PRN  ondansetron, 4 mg, Q6H PRN      Last 24 Hour Meds: :   Medication Administration - last 24 hours from 02/20/2023 0708 to 02/21/2023 0708       Date/Time Order Dose Route Action Action by     02/21/2023 0624 EST insulin lispro (HumaLOG) 100 units/mL subcutaneous injection 2-12 Units 2 Units Subcutaneous Not Given Sera Akhtar RN     02/20/2023 1800 EST insulin lispro (HumaLOG) 100 units/mL subcutaneous injection 2-12 Units 2 Units Subcutaneous Not Given Meredith Morris RN     02/20/2023 1232 EST insulin lispro (HumaLOG) 100 units/mL subcutaneous injection 2-12 Units 2 Units Subcutaneous Given Meredith Morris RN     02/20/2023 2114 EST insulin lispro (HumaLOG) 100 units/mL subcutaneous injection 2-12 Units 2 Units Subcutaneous Given Sera Akhtar RN     02/20/2023 5697 EST atorvastatin (LIPITOR) tablet 20 mg 20 mg Oral Given Meredith Morris RN     02/20/2023 0853 EST spironolactone (ALDACTONE) tablet 50 mg 50 mg Oral Given Meredith Morris RN     02/20/2023 1527 EST furosemide (LASIX) injection 80 mg 80 mg Intravenous Given [facility], RN     02/20/2023 0853 EST furosemide (LASIX) injection 80 mg 80 mg Intravenous Given [facility], RN     02/20/2023 0853 EST sacubitril-valsartan (ENTRESTO) 24-26 MG per tablet 1 tablet 1 tablet Oral Given [facility] , RN     02/21/2023 0622 EST heparin (porcine) 25,000 units in 0 45% NaCl 250 mL infusion (premix) 15 Units/kg/hr Intravenous [address] Melaniadan Loyd, RN     02/20/2023 1637 EST heparin (porcine) 25,000 units in 0 45% NaCl 250 mL infusion (premix) 15 Units/kg/hr Intravenous New Bag [facility], RN     02/20/2023 1636 EST heparin (porcine) 25,000 units in 0 45% NaCl 250 mL infusion (premix) 0 Units/kg/hr Intravenous Stopped [facility], RN     02/20/2023 1232 EST cyanocobalamin injection 1,000 mcg 1,000 mcg Intramuscular Given [facility], RN     02/20/2023 2114 EST benzonatate (TESSALON PERLES) capsule 100 mg 100 mg Oral Given Melani [facility], RN     02/20/2023 1638 EST benzonatate (TESSALON PERLES) capsule 100 mg 100 mg Oral Given [facility], RN     02/20/2023 1232 EST benzonatate (TESSALON PERLES) capsule 100 mg 100 mg Oral Given [facility], RN     02/20/2023 1523 EST potassium chloride (K-DUR,KLOR-CON) CR tablet 40 mEq 40 mEq Oral Given [facility], RN     02/20/2023 1359 EST iohexol (OMNIPAQUE) 350 MG/ML injection (SINGLE-DOSE) 100 mL 100 mL Intravenous Given Ly Temple     02/20/2023 1800 EST sacubitril-valsartan (ENTRESTO) 49-51 MG per tablet 1 tablet 1 tablet Oral Given [facility], RN     02/20/2023 1638 EST carvedilol (COREG) tablet 3 125 mg 3 125 mg Oral Given [facility], RN     02/20/2023 1904 EST HYDROcodone Bit-Homatrop MBr (HYCODAN) oral syrup 5 mL 5 mL Oral Given Meredith Morris RN     02/20/2023 2215 EST bumetanide (BUMEX) injection 4 mg 4 mg Intravenous Given Melani Hutchinson, TERRY     02/20/2023 2114 EST potassium chloride (K-DUR,KLOR-CON) CR tablet 40 mEq 40 mEq Oral Given Melani Hutchinson RN     02/21/2023 0414 EST bumetanide (BUMEX) 12 5 mg infusion 50 mL 0 5 mg/hr Intravenous Rate/Dose Change Roxana Benjamin RN     02/21/2023 0340 EST bumetanide (BUMEX) 12 5 mg infusion 50 mL 0 mg/hr Intravenous Stopped Roxana Benjamin RN     02/20/2023 2215 EST bumetanide (BUMEX) 12 5 mg infusion 50 mL 1 mg/hr Intravenous [address] Roxana Benjamin RN     02/21/2023 0719 EST potassium chloride (K-DUR,KLOR-CON) CR tablet 40 mEq 40 mEq Oral Given Roxana Benjamin RN          PLEASE NOTE:  This encounter was completed utilizing the [employer] Direct Speech Voice Recognition Software  Grammatical errors, random word insertions, pronoun errors and incomplete sentences are occasional consequences of the system due to software limitations, ambient noise and hardware issues  These may be missed by proof reading prior to affixing electronic signature  Any questions or concerns about the content, text or information contained within the body of this dictation should be directly addressed to the physician for clarification  Please do not hesitate to call me directly if you have any any questions or concerns

## 2023-02-21 NOTE — CASE MANAGEMENT
Case Management Discharge Planning Note    Patient name Jenae Abel    [facility]N [id number]  : 1995 Date 2023       Current Admission Date: 2023  Current Admission Diagnosis:Syncope, seizure like activity   Patient Active Problem List    Diagnosis Date Noted   • Acute encephalopathy    • Acute hypoxemic respiratory failure ([facility] ) 2023   • Syncope, seizure like activity 2023   • CVA (cerebral vascular accident) ([facility] ) 2023   • Nonischemic cardiomyopathy ([facility] ) 2023   • Elevated troponin 2023   • S/P ORIF (open reduction internal fixation) fracture 2022   • Closed fracture of distal end of left tibia 2022   • Acute pain due to trauma 2022   • Acute on chronic systolic congestive heart failure ([facility] ) 2022   • Type 2 diabetes mellitus ([facility] ) 2022   • Dyslipidemia 2022   • UBALDO (obstructive sleep apnea) 2022   • Morbid obesity with body mass index of 40 0-49 9 ([facility] ) 2017   • Cognitive communication deficit 2015   • Hypertension 2014      LOS (days): 3  Geometric Mean LOS (GMLOS) (days):   Days to GMLOS:     OBJECTIVE:  Risk of Unplanned Readmission Score: 13 64         Current admission status: Inpatient   Preferred Pharmacy:   [facility] [address] [facility], [address] [facility] [address] [place] [address]  Phone: [phone] Fax: [phone]    [id number] [place], [place] - [facility]  [address] [address]  Phone: [phone] Fax: [phone]    PATIENT/FAMILY REPORTS NO PREFERRED PHARMACY  No address on file      Primary Care Provider: Lyssa Canales MD    Primary Insurance: [address] [place] [address]  Secondary Insurance:     DISCHARGE DETAILS:               Treatment Team Recommendation: [address] Transfer  Discharge Destination Plan[de-identified] [address] Transfer  Transport at Discharge : [facility] Additional Comments: Spoke with Maritza Rivera at [facility], , confirmed that request for authorization has been submitted to [facility], awaiting approval for patient to be transferred to [facility]  For transportation, Maritza Rivera stated that since it is a local transport, mother will NOT need to provide payment up front, that she can sign a form acknowledging that she may be billed for the trip  Met with mother in patient's room, updated her on the above  Provider updated  CM will continue to follow

## 2023-02-21 NOTE — RESPIRATORY THERAPY NOTE
02/21/23 0713   Respiratory Assessment   Assessment Type Assess only   General Appearance Alert; Awake   Respiratory Pattern Normal   Chest Assessment Chest expansion symmetrical   Bilateral Breath Sounds Clear;Diminished   Resp Comments Bilateral BS clear/diminshed  No distress noted  No indication for UDN     O2 Device RA   Additional Assessments   Pulse 82   Respirations 20   SpO2 92 %   Position Semi-Ludwig's

## 2023-02-21 NOTE — SPEECH THERAPY NOTE
Speech/Language Evaluation      Patient Name: Archie Conteh  Summary   The patient presents with overall adequate speech, language and cognitive skills  Patient is lethargic  He is slow to respond, but ultimately appears to have adequate word finding skills  He is able to appropriately read clock and is oriented  Mother reports initial language impairment, which seems to be improving daily  She denies concerns at this time  Recommendation:  No ST warranted  Recommend f/u as needed in outpatient or rehab setting     Current Medical:       HX and PE limited by: Jaspal Gomez  is a 28 y o  male who presented to the ED for loss of consciousness  History is not obtainable from the patient as he is nonverbal  Per EMS, patient has history of seizures and had seizure like activity while at work  His mom who later arrived to the ED states that he does not have any history of seizures and that he suddenly collapsed without warning  States that he has been feeling weak and fatigued over the past two weeks but mother is unable to provide much more history  General Cognitive Status:  Alert with adequate attention  Patient fatigues quickly and falls asleep at times during session  Patient is very restless       Auditory Comprehension:  Body part ID: wfl  Left vs Right Body part: n/a  One step commands: wfl  Two step commands: wfl  Complex or 3 step commands: n/a  Picture ID: n/a  Letter ID: n/a  Personal y/n ?'s: wfl  Simple y/n ?'s: wfl  Complex y/n ?'s: n/a  Paragraph Level Comprehension: n/a  Comprehension of Conversation: wfl, slow to respond     Reading Comprehension:  Patient able to read clock     Speech Mechanism Examination:   Facial: symmetrical  Labial: WFL  Vocal quality:clear/adequate     Oral Expression:  Auto Sequences:   Count Forwards: wfl  Automatic Social Utterances[de-identified] wfl  Word Repetition: wfl  Phrase Completion: needs cues for sentences   Confrontation Naming: wfl  Responsive Naming: wfl  Divergent Naming: wfl  Picture Description: n/a  Conversation: wfl  Able to make basic needs known:  yes    Written Expression:  Not assessed     Motor Speech:  No dysarthria or apraxia noted     Orientation:  Person: [facility]  Place: [facility], [address]: n/a  Time of Day: wfl  Month: wfl  AMANDA: n/a  Year:  wfl  Reason for hospitalization: n/a    Cognitive -linguistic skills:  Grossly intact    Also noted:  Restless     Results discussed with:  Patient, RN and mother

## 2023-02-21 NOTE — CASE MANAGEMENT
[facility] Ahuja [facility] received approval from hospital to hospital transfer from insurance: [address] [place] [address] in by Rep:  Mirella CLEARY#  [phone]  Authorization #: [id number]   Care Manager notified: Yanira Tan

## 2023-02-21 NOTE — ASSESSMENT & PLAN NOTE
Lab Results   Component Value Date    HGBA1C 7 4 (H) 02/20/2023       Recent Labs     02/20/23  2114 02/21/23  0621 02/21/23  1245 02/21/23  1620   POCGLU 159* 119 147* 236*       Blood Sugar Average: Last 72 hrs:  (P) 135 0478     Patient with history of type 2 diabetes on Synjardy and Ozempic prior to arrival     Plan:  Blood glucose currently well controlled  Continue sliding scale insulin, blood glucose checks  Adjust insulin as necessary

## 2023-02-21 NOTE — CASE MANAGEMENT
Amie Nichols from [address] ([phone]) called and requested clarification on whether transfer happened yet or not and the reasoning for transfer  Authorization sent to Medical Director  Will call back with determination once Medical Director has reviewed  CM notified

## 2023-02-22 NOTE — ASSESSMENT & PLAN NOTE
Patient initially requiring BiPAP on admission , subsequently weaned to room air  Patient does have a history of UBALDO and is noncompliant with CPAP  Recommend patient continue to use BiPAP nightly

## 2023-02-22 NOTE — ASSESSMENT & PLAN NOTE
Patient with altered mental status following syncopal/seizure-like episode during which time patient was nonverbal but following commands  Patient now back to baseline mental status per mother at bedside    Patient's mother refused brain MRI  Suspect secondary to LV thrombus  Continue statin and heparin drip

## 2023-02-22 NOTE — ASSESSMENT & PLAN NOTE
Patient with witnessed episode of syncope/seizure-like activity while out with friends 2/17, denies hx of seizures or similar episodes  Pt nonverbal, not following commands post event, progressively more alert throughout the day 2/18  Plan:  · Cardiology consulted for AICD interrogation and echo    · Preliminary echo finding of LV thrombus discussed with Dr Kelsey Fink  · Possible CVA from LV thrombus causing his initial presentation  · Repeat CT scan of head is positive for CVA  · CTA of head and neck with evolving acute left corpus stratum infarct  · Neurology CTA revealed a more acute infarct  · EEG was normal  · Patient's mother refused brain MRI she wants him to be transferred to [facility] since they normally care for him there  · Neurology plan was to continue with heparin drip then consider transition to oral anticoagulation

## 2023-02-22 NOTE — ASSESSMENT & PLAN NOTE
Patient with history of nonischemic cardiomyopathy (EF 20-25%)  Patient follows with cardiology at [facility] as outpatient  He is status post AICD placement November 11/22      Plan:  Echo with finding of LV thrombus, will continue IV heparin  Continue telemetry  Cardiology consulted, appreciate recommendations  Interrogate AICD with no ICD shocks

## 2023-02-22 NOTE — DISCHARGE SUMMARY
[address] [facility]  Discharge- Lodema Left  1995, 28 y o  male MRN: [id number]  Unit/Bed#: [facility] 201-01 Encounter: [id number]  Primary Care Provider: Siri Diaz MD   Date and time admitted to hospital: 2/17/2023 10:21 PM    * Syncope, seizure like activity  Assessment & Plan  Patient with witnessed episode of syncope/seizure-like activity while out with friends 2/17, denies hx of seizures or similar episodes  Pt nonverbal, not following commands post event, progressively more alert throughout the day 2/18  Plan:  · Cardiology consulted for AICD interrogation and echo  · Preliminary echo finding of LV thrombus discussed with Dr Scott Peterson  · Possible CVA from LV thrombus causing his initial presentation  · Repeat CT scan of head is positive for CVA  · CTA of head and neck with evolving acute left corpus stratum infarct  · Neurology CTA revealed a more acute infarct  · EEG was normal  · Patient's mother refused brain MRI she wants him to be transferred to [facility] since they normally care for him there  · Neurology plan was to continue with heparin drip then consider transition to oral anticoagulation    Acute on chronic systolic congestive heart failure [facility])  Assessment & Plan  Wt Readings from Last 3 Encounters:   02/20/23 (!) 154 kg (339 lb)   02/19/23 (!) 156 kg (343 lb 14 7 oz)   02/15/23 (!) 154 kg (340 lb)     Pt presented with elevated , crackles and LE edema on exam, increased 02 requirment on bipap overnight  Given Lasix 80 IV in ED  CXR with pulmonary edema  Reviewed home medications with patient, however he does not seem certain on which medications he takes, nor does his mother at bedside   He does not have a readily accessible medication list  Per most recent cardiology note from 2/6/23 cardiac medications include: atorvastatin 20 mg daily, carvedilol 25 mg twice daily, Ivabradine, spironolactone 50 mg daily, torsemide 80 mg daily, Entresto 75 to 103 mg twice daily  Continue diuresis  Resumed patient's carvedilol 25 mg twice daily and spironolactone  Cardiology consulted, appreciated recommendations  Currently on Bumex drip  Continue Entresto  Strict intake and output        Acute hypoxemic respiratory failure [facility])  Assessment & Plan  Patient initially requiring BiPAP on admission , subsequently weaned to room air  Patient does have a history of UBALDO and is noncompliant with CPAP  Recommend patient continue to use BiPAP nightly  Nonischemic cardiomyopathy [facility])  Assessment & Plan  Patient with history of nonischemic cardiomyopathy (EF 20-25%)  Patient follows with cardiology at [facility] as outpatient  He is status post AICD placement November 11/22  Plan:  Echo with finding of LV thrombus, will continue IV heparin  Continue telemetry  Cardiology consulted, appreciate recommendations  Interrogate AICD with no ICD shocks      CVA (cerebral vascular accident) [facility])  Assessment & Plan  Patient with altered mental status following syncopal/seizure-like episode during which time patient was nonverbal but following commands  Patient now back to baseline mental status per mother at bedside  Patient's mother refused brain MRI  Suspect secondary to LV thrombus  Continue statin and heparin drip      UBALDO (obstructive sleep apnea)  Assessment & Plan  Patient with history of UBALDO, noncompliant with CPAP  Patient required BiPAP overnight      Plan:  Continue BiPAP nightly while inpatient    Type 2 diabetes mellitus [facility])  Assessment & Plan  Lab Results   Component Value Date    HGBA1C 7 4 (H) 02/20/2023       Recent Labs     02/20/23  2114 02/21/23  0621 02/21/23  1245 02/21/23  1620   POCGLU 159* 119 147* 236*       Blood Sugar Average: Last 72 hrs:  (P) 135 8125     Patient with history of type 2 diabetes on Synjardy and Ozempic prior to arrival     Plan:  Blood glucose currently well controlled  Continue sliding scale insulin, blood glucose checks  Adjust insulin as necessary      Medical Problems     Resolved Problems  Date Reviewed: 2/21/2023   None       Discharging Physician / Practitioner: Renaldo Siegel DO  PCP: Romelle Lefort, MD  Admission Date:   Admission Orders (From admission, onward)     Ordered        02/18/23 0156  INPATIENT ADMISSION  Once                      Discharge Date: 02/21/23    Consultations During Hospital Stay:  · Heart failure   · Neurology  ·     Procedures Performed:   · Cardiac echo with EF 20%, severe global hypokinesis and grade 2 diastolic dysfunction, moderate TR  · Head CT scan Evolving acute left corpus striatum infarct with mild associated mass effect due to cytotoxic edema without evidence for hemorrhage  Head and neck CTA  Evolving left corpus striatum infarct with stable mild local mass effect in the left frontal horn  No evidence for secondary parenchymal hematoma      No evidence for high-grade stenosis, focal occlusion or vascular aneurysm of the cervical or intracranial vessels  Significant Findings / Test Results:   As above  Incidental Findings:   · none    Test Results Pending at Discharge (will require follow up): · Chest x-ray       Complications:  none    Reason for Admission:   Acute encephalopathy  Acute hypoxic respiratory failure   Syncope    Hospital Course:   Sherry Lott  is a 28 y o  male patient who originally presented to the hospital on 2/17/2023 due to having a witnessed syncope/seizure event and subsequent altered mental status  Patient with past medical history significant for nonischemic cardiomyopathy with ejection fraction 20 to 25% status post AICD placement November 2022, type 2 diabetes, hypertension, hyperlipidemia, UBALDO  Jana Massey Patient was out with friends when he began to slur his words subsequently fell on his knees and lost consciousness  Patient initially in ED nonverbal, but did follow commands    Cardiac work-up was initiated patient was placed on BiPAP for increased work of breathing  Pt was admitted to SD1 given AMS and BiPAP requirement        Pt received lasix 80 mg IV due to concern for heart failure exacerbation with chest x-ray demonstrating pulmonary edema, Rales and lower extremity edema on exam as well as elevated BNP of 902  Cardiac workup notable for telemetry with 4 beats of NSVT, elevated troponins  Initial lactate 2 8, resolved on repeat  Patient continued to be nonverbal next morning and received Narcan without significant immediate improvement  UDS negative  Later in the  afternoon patient was alert, responding appropriately and on room air  Patient does not remember losing consciousness, but does remember falling         Patient was given an additional dose of Lasix 80 IV  Cardiology was consulted for management of heart failure exacerbation and interrogation of AICD        Her work-up in the hospital he was found to have acute CVA , left ventricle thrombus and CHF exacerbation  Patient was treated with Bumex drip and heparin drip was initiated    Patient's mother wants patient to be transferred to [facility] since he received the care there  Per patient was sent to brain MRI his mother refused    Patient was accepted to  [facility] by dr Durwin Angelucci  Please see above list of diagnoses and related plan for additional information       Condition at Discharge: stable    Discharge Day Visit / Exam:   Subjective:    Patient seen and examined  Comfortable in bed  Denies chest pain or shortness of breath  Patient's mother at bedside  Vitals: Blood Pressure: 124/66 (02/21/23 1535)  Pulse: 94 (02/21/23 1535)  Temperature: 97 7 °F (36 5 °C) (02/21/23 1535)  Temp Source: Oral (02/21/23 1535)  Respirations: 20 (02/21/23 0713)  Height: 5' 10" (177 8 cm) (02/18/23 1313)  Weight - Scale: (!) 154 kg (339 lb) (02/20/23 0600)  SpO2: 97 % (02/21/23 1535)  Exam:   Physical Exam     Patient is awake alert oriented in no acute distress  Comfortable sitting in bed  Lung with decreased breath sound bilateral  Heart positive S1-S2 no murmur  Abdomen soft nontender  Lower extremities edema    Discussion with Family: Updated  (mother) at bedside  Discharge instructions/Information to patient and family:   See after visit summary for information provided to patient and family  Provisions for Follow-Up Care:  See after visit summary for information related to follow-up care and any pertinent home health orders  Disposition:   [address] Transfer to [facility]    Planned Readmission: no     Discharge Statement:  I spent 50  minutes discharging the patient  This time was spent on the day of discharge  I had direct contact with the patient on the day of discharge  Greater than 50% of the total time was spent examining patient, answering all patient questions, arranging and discussing plan of care with patient as well as directly providing post-discharge instructions  Additional time then spent on discharge activities  Discharge Medications:  See after visit summary for reconciled discharge medications provided to patient and/or family        **Please Note: This note may have been constructed using a voice recognition system**

## 2023-02-22 NOTE — ASSESSMENT & PLAN NOTE
Wt Readings from Last 3 Encounters:   02/20/23 (!) 154 kg (339 lb)   02/19/23 (!) 156 kg (343 lb 14 7 oz)   02/15/23 (!) 154 kg (340 lb)     Pt presented with elevated , crackles and LE edema on exam, increased 02 requirment on bipap overnight  Given Lasix 80 IV in ED  CXR with pulmonary edema  Reviewed home medications with patient, however he does not seem certain on which medications he takes, nor does his mother at bedside  He does not have a readily accessible medication list  Per most recent cardiology note from 2/6/23 cardiac medications include: atorvastatin 20 mg daily, carvedilol 25 mg twice daily, Ivabradine, spironolactone 50 mg daily, torsemide 80 mg daily, Entresto 75 to 103 mg twice daily  Continue diuresis  Resumed patient's carvedilol 25 mg twice daily and spironolactone     Cardiology consulted, appreciated recommendations  Currently on Bumex drip  Continue Entresto  Strict intake and output

## 2023-02-23 ENCOUNTER — APPOINTMENT (OUTPATIENT)
Dept: PHYSICAL THERAPY | Facility: CLINIC | Age: 28
End: 2023-02-23

## 2023-02-23 LAB
ATRIAL RATE: 99 BPM
P AXIS: 63 DEGREES
PR INTERVAL: 179 MS
QRS AXIS: 93 DEGREES
QRSD INTERVAL: 100 MS
QT INTERVAL: 325 MS
QTC INTERVAL: 417 MS
T WAVE AXIS: -70 DEGREES
VENTRICULAR RATE: 99 BPM

## 2023-02-27 ENCOUNTER — APPOINTMENT (OUTPATIENT)
Dept: PHYSICAL THERAPY | Facility: CLINIC | Age: 28
End: 2023-02-27

## 2023-03-08 ENCOUNTER — TELEPHONE (OUTPATIENT)
Dept: OBGYN CLINIC | Facility: HOSPITAL | Age: 28
End: 2023-03-08

## 2023-03-08 NOTE — TELEPHONE ENCOUNTER
Returned [facility] from [facility],   Kira wanted an update on CT lower extremity  Informed test is scheduled for 3/20/23  Will f/u at the end of the month

## 2023-03-08 NOTE — TELEPHONE ENCOUNTER
Caller: Jaylon Kumar    Doctor: Marylene Donalds     Reason for call: Checking on next appt  I advised her that we left message for her re: CT auth  She said we can call her back directly        Call back#: [phone]

## 2023-03-29 ENCOUNTER — TELEPHONE (OUTPATIENT)
Dept: OBGYN CLINIC | Facility: HOSPITAL | Age: 28
End: 2023-03-29

## 2023-03-29 NOTE — TELEPHONE ENCOUNTER
ANMOL for patient's mother informing her that the CT on 4/4/23 needs to be completed so we can have the results to provide to the insurance company for prior [facility] for the MRI which is scheduled for 4/18/23

## 2023-04-04 ENCOUNTER — HOSPITAL ENCOUNTER (OUTPATIENT)
Dept: CT IMAGING | Facility: HOSPITAL | Age: 28
Discharge: HOME | End: 2023-04-04
Attending: ORTHOPAEDIC SURGERY

## 2023-04-04 DIAGNOSIS — M79.89 SWELLING OF LEFT LOWER EXTREMITY: ICD-10-CM

## 2023-04-04 DIAGNOSIS — S82.872S CLOSED DISPLACED PILON FRACTURE OF LEFT TIBIA, SEQUELA: ICD-10-CM

## 2023-04-04 DIAGNOSIS — M25.672 STIFFNESS OF LEFT ANKLE JOINT: ICD-10-CM

## 2023-04-04 DIAGNOSIS — Z87.81 S/P ORIF (OPEN REDUCTION INTERNAL FIXATION) FRACTURE: ICD-10-CM

## 2023-04-04 DIAGNOSIS — R29.898 ANKLE WEAKNESS: ICD-10-CM

## 2023-04-04 DIAGNOSIS — Z98.890 S/P ORIF (OPEN REDUCTION INTERNAL FIXATION) FRACTURE: ICD-10-CM

## 2023-05-10 ENCOUNTER — OFFICE VISIT (OUTPATIENT)
Dept: OBGYN CLINIC | Facility: HOSPITAL | Age: 28
End: 2023-05-10

## 2023-05-10 VITALS
WEIGHT: 315 LBS | HEIGHT: 71 IN | DIASTOLIC BLOOD PRESSURE: 91 MMHG | SYSTOLIC BLOOD PRESSURE: 134 MMHG | BODY MASS INDEX: 44.1 KG/M2 | HEART RATE: 52 BPM

## 2023-05-10 DIAGNOSIS — S82.872S CLOSED DISPLACED PILON FRACTURE OF LEFT TIBIA, SEQUELA: ICD-10-CM

## 2023-05-10 DIAGNOSIS — M79.89 SWELLING OF LEFT LOWER EXTREMITY: ICD-10-CM

## 2023-05-10 DIAGNOSIS — M25.672 STIFFNESS OF LEFT ANKLE JOINT: ICD-10-CM

## 2023-05-10 DIAGNOSIS — R29.898 ANKLE WEAKNESS: ICD-10-CM

## 2023-05-10 DIAGNOSIS — Z87.81 S/P ORIF (OPEN REDUCTION INTERNAL FIXATION) FRACTURE: Primary | ICD-10-CM

## 2023-05-10 DIAGNOSIS — Z98.890 S/P ORIF (OPEN REDUCTION INTERNAL FIXATION) FRACTURE: Primary | ICD-10-CM

## 2023-05-10 NOTE — PROGRESS NOTES
Assessment:  1  S/P ORIF (open reduction internal fixation) fracture        2  Closed displaced pilon fracture of left tibia, sequela        3  Stiffness of left ankle joint        4  Ankle weakness        5  Swelling of left lower extremity            Plan:  14 months s/p left ankle pilon fracture ORIF, 3/3/2022  Now with pain over fibula    CT results reveal largely healed pilon fracture with exception of small portion of posterior cortex  Patient does not appear to be having pain in this region    · Patient continues to have significant pain especially with standing  Has persistent leg swelling preventing regular shoewear  · Left ankle MRI reviewed with patient and mother  · Patient referred to [facility] for evaluation for any additional treatment modalities  · The idea of MAY/FCE was discussed as potential for permanent work restrictions   · Order not placed and only discussed  · Follow up after evaluation by [facility]    Work status:  Patient to be out of work until follow up  · Patient has attempted to work with restriction with continued significant left ankle pain        To do next visit:  Return for follw up after Dr Lachman evaluation  The above stated was discussed in layman's terms and the patient expressed understanding  All questions were answered to the patient's satisfaction  Scribe Attestation    I,:  Rylie Ramírez am acting as a scribe while in the presence of the attending physician :       I,:  Fannie Singleton MD personally performed the services described in this documentation    as scribed in my presence :             Subjective:   Imelda Tipton  is a 28 y o  male who presents 14 months s/p left ankle pilon fracture ORIF, 3/3/2022  He is progressing slowly  Today he complains of constant fluctuating global left ankle pain and lateral ankle pain with swelling  He rates his pain at 6/10 and 10/10 at its worse    Cold weather, standing any amount of time and walking aggravates while rest alleviates  He does use Tylenol for pain  He currently uses Eliquis    He has attempted to work under restrictions yet has been out of work since last visit   `      Review of systems negative unless otherwise specified in HPI    Past Medical History:   Diagnosis Date   • Enchondroma of hand bone     last assessed: 4/21/2015   • Heart trouble    • Hypertension    • Palpitations        Past Surgical History:   Procedure Laterality Date   • EXTERNAL FIXATOR APPLICATION Left 9/97/5552    Procedure: APPLICATION EXTERNAL FIXATION DEVICE LOWER EXTREMITY;  Surgeon: Julieth Ramírez MD;  Location: [facility];  Service: Orthopedics   • NO PAST SURGERIES     • ORIF TIBIA FRACTURE Left 3/3/2022    Procedure: OPEN REDUCTION W/ INTERNAL FIXATION (ORIF) LEFT TIBIA PILON FRACTURE, REMOVAL OF EXTERNAL FIXATOR;  Surgeon: Julieth Ramírez MD;  Location: [facility];  Service: Orthopedics       Family History   Problem Relation Age of Onset   • No Known Problems Mother    • No Known Problems Father    • Autism Brother         autistic diosrder   • Diabetes type II Paternal Grandmother         mellitus   • Autism Brother         autistic diosrder   • Depression Other        Social History     Occupational History   • Not on file   Tobacco Use   • Smoking status: Never   • Smokeless tobacco: Never   Vaping Use   • Vaping Use: Never used   Substance and Sexual Activity   • Alcohol use: No   • Drug use: No   • Sexual activity: Not on file         Current Outpatient Medications:   •  acetaminophen (TYLENOL) 325 mg tablet, Take 3 tablets (975 mg total) by mouth every 8 (eight) hours as needed for mild pain, Disp: , Rfl: 0  •  albuterol (PROVENTIL HFA,VENTOLIN HFA) 90 mcg/act inhaler, Inhale 2 puffs every 4 (four) hours as needed for wheezing, Disp: , Rfl: 0  •  atorvastatin (LIPITOR) 20 mg tablet, Take 20 mg by mouth daily, Disp: , Rfl:   •  benzonatate (TESSALON PERLES) 100 mg capsule, Take 1 capsule (100 mg total) by mouth 3 (three) times a day, Disp: 20 capsule, Rfl: 0  •  bumetanide (BUMEX) 12 5 mg infusion 50 mL, Inject 0 5 mg/hr into a catheter in a vein at 2 mL/hr continuous, Disp: , Rfl: 0  •  carvedilol (COREG) 3 125 mg tablet, Take 1 tablet (3 125 mg total) by mouth 2 (two) times a day with meals, Disp: , Rfl: 0  •  heparin, porcine, 73538-5 45 UT/250ML-%, Inject 390-3,120 Units/hr into a catheter in a vein at 3 9-31 2 mL/hr titrated, Disp: , Rfl: 0  •  insulin lispro (HumaLOG) 100 units/mL injection, Inject 2-12 Units under the skin 3 (three) times a day before meals, Disp: , Rfl: 0  •  insulin lispro (HumaLOG) 100 units/mL injection, Inject 2-12 Units under the skin daily at bedtime, Disp: , Rfl: 0  •  ondansetron (ZOFRAN) 4 mg/2 mL injection, Inject 2 mL (4 mg total) into a catheter in a vein every 6 (six) hours as needed for nausea, Disp: , Rfl: 0  •  sacubitril-valsartan (ENTRESTO) 49-51 MG TABS, Take 1 tablet by mouth 2 (two) times a day, Disp: , Rfl: 0  •  spironolactone (ALDACTONE) 50 mg tablet, Take 1 tablet (50 mg total) by mouth daily Do not start before February 22, 2023 , Disp: , Rfl: 0  •  cyanocobalamin 1,000 mcg/mL, Inject 1 mL (1,000 mcg total) into a muscle daily for 2 doses Do not start before February 22, 2023 , Disp: 1 mL, Rfl: 0    Allergies   Allergen Reactions   • Banana - Food Allergy Other (See Comments)         • Bee Venom Other (See Comments)         • Pollen Extract             Vitals:    05/10/23 0759   BP: 134/91   Pulse: (!) 52       Objective:  Physical exam  · General: Awake, Alert, Oriented  · Eyes: Pupils equal, round and reactive to light  · Heart: regular rate and rhythm  · Lungs: No audible wheezing  · Abdomen: soft                    Ortho Exam  Left ankle:  No erythema or ecchymosis  Moderate distal swelling, no swelling of contralateral side  Tenderness over posterior distal fibula  Calf compartments soft and supple  Sensation intact  Toes are warm sensate and "mobile      Diagnostics, reviewed and taken today if performed as documented: The attending physician has personally reviewed the pertinent films in PACS and interpretation is as follows:  Left ankle MRI:  No localized fluid collection  No definitive evidence of osteomyelitis within limitations of the study  Posttraumatic cortical deformity of distal tibia with a slitlike cortical defect posteriorly is similar to prior CT  Procedures, if performed today:    Procedures    None performed      Portions of the record may have been created with voice recognition software  Occasional wrong word or \"sound a like\" substitutions may have occurred due to the inherent limitations of voice recognition software  Read the chart carefully and recognize, using context, where substitutions have occurred      "

## 2023-05-10 NOTE — LETTER
May 10, 2023     Patient: Meryle Poag  YOB: 1995  Date of Visit: 5/10/2023      To Whom it May Concern:    Leighton Milner is under my professional care  Chong Post was seen in my office on 5/10/2023  The patient should remain out of work until further notice  If you have any questions or concerns, please don't hesitate to call           Sincerely,          Soha Ramos MD        CC: No Recipients

## 2023-05-17 ENCOUNTER — TELEPHONE (OUTPATIENT)
Dept: OBGYN CLINIC | Facility: HOSPITAL | Age: 28
End: 2023-05-17

## 2023-05-17 NOTE — TELEPHONE ENCOUNTER
Caller: Rolando MASON    Doctor: Levon Tellez    Reason for call: Faxed over [address] from 05/10 - Fax: [phone]    Call back#: [phone]

## 2023-05-23 ENCOUNTER — OFFICE VISIT (OUTPATIENT)
Dept: OBGYN CLINIC | Facility: CLINIC | Age: 28
End: 2023-05-23

## 2023-05-23 VITALS
BODY MASS INDEX: 44.1 KG/M2 | WEIGHT: 315 LBS | HEIGHT: 71 IN | HEART RATE: 101 BPM | DIASTOLIC BLOOD PRESSURE: 93 MMHG | SYSTOLIC BLOOD PRESSURE: 138 MMHG

## 2023-05-23 DIAGNOSIS — Z98.890 S/P ORIF (OPEN REDUCTION INTERNAL FIXATION) FRACTURE: ICD-10-CM

## 2023-05-23 DIAGNOSIS — M79.89 SWELLING OF LEFT LOWER EXTREMITY: ICD-10-CM

## 2023-05-23 DIAGNOSIS — M25.672 STIFFNESS OF LEFT ANKLE JOINT: ICD-10-CM

## 2023-05-23 DIAGNOSIS — S82.872S CLOSED DISPLACED PILON FRACTURE OF LEFT TIBIA, SEQUELA: ICD-10-CM

## 2023-05-23 DIAGNOSIS — Z87.81 S/P ORIF (OPEN REDUCTION INTERNAL FIXATION) FRACTURE: ICD-10-CM

## 2023-05-23 DIAGNOSIS — R29.898 ANKLE WEAKNESS: ICD-10-CM
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
## 2023-06-14 ENCOUNTER — EVALUATION (OUTPATIENT)
Dept: PHYSICAL THERAPY | Facility: CLINIC | Age: 28
End: 2023-06-14
Payer: OTHER MISCELLANEOUS

## 2023-06-14 DIAGNOSIS — S82.872S CLOSED DISPLACED PILON FRACTURE OF LEFT TIBIA, SEQUELA: Primary | ICD-10-CM

## 2023-06-14 PROCEDURE — 97161 PT EVAL LOW COMPLEX 20 MIN: CPT
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
## 2023-06-20 ENCOUNTER — OFFICE VISIT (OUTPATIENT)
Dept: PHYSICAL THERAPY | Facility: CLINIC | Age: 28
End: 2023-06-20
Payer: OTHER MISCELLANEOUS

## 2023-06-20 DIAGNOSIS — S82.872S CLOSED DISPLACED PILON FRACTURE OF LEFT TIBIA, SEQUELA: Primary | ICD-10-CM

## 2023-06-20 PROCEDURE — 97140 MANUAL THERAPY 1/> REGIONS: CPT

## 2023-06-20 PROCEDURE — 97110 THERAPEUTIC EXERCISES: CPT
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
## 2023-06-30 ENCOUNTER — OFFICE VISIT (OUTPATIENT)
Dept: PHYSICAL THERAPY | Facility: CLINIC | Age: 28
End: 2023-06-30
Payer: OTHER MISCELLANEOUS

## 2023-06-30 DIAGNOSIS — S82.872S CLOSED DISPLACED PILON FRACTURE OF LEFT TIBIA, SEQUELA: Primary | ICD-10-CM

## 2023-06-30 PROCEDURE — 97110 THERAPEUTIC EXERCISES: CPT

## 2023-06-30 PROCEDURE — 97140 MANUAL THERAPY 1/> REGIONS: CPT

## 2023-06-30 PROCEDURE — 97112 NEUROMUSCULAR REEDUCATION: CPT

## 2023-06-30 PROCEDURE — 97530 THERAPEUTIC ACTIVITIES: CPT
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
## 2023-07-07 ENCOUNTER — APPOINTMENT (OUTPATIENT)
Dept: PHYSICAL THERAPY | Facility: CLINIC | Age: 28
End: 2023-07-07
Payer: OTHER MISCELLANEOUS

## 2023-07-10 ENCOUNTER — OFFICE VISIT (OUTPATIENT)
Dept: PHYSICAL THERAPY | Facility: CLINIC | Age: 28
End: 2023-07-10
Payer: OTHER MISCELLANEOUS

## 2023-07-10 DIAGNOSIS — S82.872S CLOSED DISPLACED PILON FRACTURE OF LEFT TIBIA, SEQUELA: Primary | ICD-10-CM

## 2023-07-10 PROCEDURE — 97112 NEUROMUSCULAR REEDUCATION: CPT

## 2023-07-10 PROCEDURE — 97110 THERAPEUTIC EXERCISES: CPT

## 2023-07-10 PROCEDURE — 97530 THERAPEUTIC ACTIVITIES: CPT
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
## 2023-08-04 ENCOUNTER — OFFICE VISIT (OUTPATIENT)
Dept: PHYSICAL THERAPY | Facility: CLINIC | Age: 28
End: 2023-08-04
Payer: OTHER MISCELLANEOUS

## 2023-08-04 DIAGNOSIS — S82.872S CLOSED DISPLACED PILON FRACTURE OF LEFT TIBIA, SEQUELA: Primary | ICD-10-CM

## 2023-08-04 PROCEDURE — 97112 NEUROMUSCULAR REEDUCATION: CPT

## 2023-08-04 PROCEDURE — 97110 THERAPEUTIC EXERCISES: CPT
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
## 2023-08-11 ENCOUNTER — OFFICE VISIT (OUTPATIENT)
Dept: PHYSICAL THERAPY | Facility: CLINIC | Age: 28
End: 2023-08-11
Payer: OTHER MISCELLANEOUS

## 2023-08-11 DIAGNOSIS — S82.872S CLOSED DISPLACED PILON FRACTURE OF LEFT TIBIA, SEQUELA: Primary | ICD-10-CM

## 2023-08-11 PROCEDURE — 97530 THERAPEUTIC ACTIVITIES: CPT

## 2023-08-11 PROCEDURE — 97112 NEUROMUSCULAR REEDUCATION: CPT

## 2023-08-11 PROCEDURE — 97110 THERAPEUTIC EXERCISES: CPT

## 2023-08-11 PROCEDURE — 97140 MANUAL THERAPY 1/> REGIONS: CPT
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
## 2023-08-18 ENCOUNTER — OFFICE VISIT (OUTPATIENT)
Dept: PHYSICAL THERAPY | Facility: CLINIC | Age: 28
End: 2023-08-18
Payer: OTHER MISCELLANEOUS

## 2023-08-18 DIAGNOSIS — S82.872S CLOSED DISPLACED PILON FRACTURE OF LEFT TIBIA, SEQUELA: Primary | ICD-10-CM

## 2023-08-18 PROCEDURE — 97112 NEUROMUSCULAR REEDUCATION: CPT

## 2023-08-18 PROCEDURE — 97140 MANUAL THERAPY 1/> REGIONS: CPT

## 2023-08-18 PROCEDURE — 97110 THERAPEUTIC EXERCISES: CPT
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
## 2023-08-23 ENCOUNTER — OFFICE VISIT (OUTPATIENT)
Dept: PHYSICAL THERAPY | Facility: CLINIC | Age: 28
End: 2023-08-23
Payer: OTHER MISCELLANEOUS

## 2023-08-23 DIAGNOSIS — S82.872S CLOSED DISPLACED PILON FRACTURE OF LEFT TIBIA, SEQUELA: Primary | ICD-10-CM

## 2023-08-23 PROCEDURE — 97530 THERAPEUTIC ACTIVITIES: CPT

## 2023-08-23 PROCEDURE — 97110 THERAPEUTIC EXERCISES: CPT

## 2023-08-23 PROCEDURE — 97112 NEUROMUSCULAR REEDUCATION: CPT

## 2023-08-23 PROCEDURE — 97140 MANUAL THERAPY 1/> REGIONS: CPT
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
## 2023-08-29 ENCOUNTER — TELEPHONE (OUTPATIENT)
Age: 28
End: 2023-08-29

## 2023-08-29 NOTE — TELEPHONE ENCOUNTER
Caller: Pts mom    Doctor: Vincent Cunningham    Reason for call: Pt's mom is wondering if pt should see Dr Vincent Cunningham again. Pt was referred to [facility], but mom wants to know if Vincent Cunningham would like to follow up again. ..     Call back#: [phone]

## 2023-08-30 ENCOUNTER — OFFICE VISIT (OUTPATIENT)
Dept: PHYSICAL THERAPY | Facility: CLINIC | Age: 28
End: 2023-08-30
Payer: OTHER MISCELLANEOUS

## 2023-08-30 DIAGNOSIS — S82.872S CLOSED DISPLACED PILON FRACTURE OF LEFT TIBIA, SEQUELA: Primary | ICD-10-CM

## 2023-08-30 PROCEDURE — 97112 NEUROMUSCULAR REEDUCATION: CPT

## 2023-08-30 PROCEDURE — 97140 MANUAL THERAPY 1/> REGIONS: CPT

## 2023-08-30 PROCEDURE — 97530 THERAPEUTIC ACTIVITIES: CPT
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
## 2023-09-06 ENCOUNTER — OFFICE VISIT (OUTPATIENT)
Dept: PHYSICAL THERAPY | Facility: CLINIC | Age: 28
End: 2023-09-06
Payer: OTHER MISCELLANEOUS

## 2023-09-06 DIAGNOSIS — S82.872S CLOSED DISPLACED PILON FRACTURE OF LEFT TIBIA, SEQUELA: Primary | ICD-10-CM

## 2023-09-06 PROCEDURE — 97112 NEUROMUSCULAR REEDUCATION: CPT

## 2023-09-06 PROCEDURE — 97110 THERAPEUTIC EXERCISES: CPT

## 2023-09-06 PROCEDURE — 97010 HOT OR COLD PACKS THERAPY: CPT

## 2023-09-06 PROCEDURE — 97530 THERAPEUTIC ACTIVITIES: CPT
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
## 2023-09-13 ENCOUNTER — EVALUATION (OUTPATIENT)
Dept: PHYSICAL THERAPY | Facility: CLINIC | Age: 28
End: 2023-09-13
Payer: OTHER MISCELLANEOUS

## 2023-09-13 DIAGNOSIS — S82.872S CLOSED DISPLACED PILON FRACTURE OF LEFT TIBIA, SEQUELA: Primary | ICD-10-CM

## 2023-09-13 PROCEDURE — 97140 MANUAL THERAPY 1/> REGIONS: CPT

## 2023-09-13 PROCEDURE — 97110 THERAPEUTIC EXERCISES: CPT

## 2023-09-13 PROCEDURE — 97164 PT RE-EVAL EST PLAN CARE: CPT
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
## 2023-09-20 ENCOUNTER — OFFICE VISIT (OUTPATIENT)
Dept: PHYSICAL THERAPY | Facility: CLINIC | Age: 28
End: 2023-09-20
Payer: OTHER MISCELLANEOUS

## 2023-09-20 DIAGNOSIS — S82.872S CLOSED DISPLACED PILON FRACTURE OF LEFT TIBIA, SEQUELA: Primary | ICD-10-CM

## 2023-09-20 PROCEDURE — 97140 MANUAL THERAPY 1/> REGIONS: CPT

## 2023-09-20 PROCEDURE — 97530 THERAPEUTIC ACTIVITIES: CPT

## 2023-09-20 PROCEDURE — 97112 NEUROMUSCULAR REEDUCATION: CPT

## 2023-09-20 PROCEDURE — 97110 THERAPEUTIC EXERCISES: CPT
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
## 2023-09-27 ENCOUNTER — OFFICE VISIT (OUTPATIENT)
Dept: PHYSICAL THERAPY | Facility: CLINIC | Age: 28
End: 2023-09-27
Payer: OTHER MISCELLANEOUS

## 2023-09-27 DIAGNOSIS — S82.872S CLOSED DISPLACED PILON FRACTURE OF LEFT TIBIA, SEQUELA: Primary | ICD-10-CM

## 2023-09-27 PROCEDURE — 97112 NEUROMUSCULAR REEDUCATION: CPT

## 2023-09-27 PROCEDURE — 97530 THERAPEUTIC ACTIVITIES: CPT

## 2023-09-27 PROCEDURE — 97140 MANUAL THERAPY 1/> REGIONS: CPT
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
## 2023-09-29 DIAGNOSIS — Z87.81 S/P ORIF (OPEN REDUCTION INTERNAL FIXATION) FRACTURE: Primary | ICD-10-CM

## 2023-09-29 DIAGNOSIS — Z98.890 S/P ORIF (OPEN REDUCTION INTERNAL FIXATION) FRACTURE: Primary | ICD-10-CM

## 2023-10-04 ENCOUNTER — APPOINTMENT (OUTPATIENT)
Dept: PHYSICAL THERAPY | Facility: CLINIC | Age: 28
End: 2023-10-04
Payer: OTHER MISCELLANEOUS

## 2023-10-04 ENCOUNTER — OFFICE VISIT (OUTPATIENT)
Dept: OBGYN CLINIC | Facility: HOSPITAL | Age: 28
End: 2023-10-04
Payer: OTHER MISCELLANEOUS

## 2023-10-04 ENCOUNTER — HOSPITAL ENCOUNTER (OUTPATIENT)
Dept: RADIOLOGY | Facility: HOSPITAL | Age: 28
Discharge: HOME | End: 2023-10-04
Attending: ORTHOPAEDIC SURGERY
Payer: COMMERCIAL

## 2023-10-04 VITALS
SYSTOLIC BLOOD PRESSURE: 107 MMHG | HEIGHT: 71 IN | WEIGHT: 315 LBS | DIASTOLIC BLOOD PRESSURE: 75 MMHG | HEART RATE: 68 BPM | BODY MASS INDEX: 44.1 KG/M2

## 2023-10-04 DIAGNOSIS — Z98.890 S/P ORIF (OPEN REDUCTION INTERNAL FIXATION) FRACTURE: ICD-10-CM

## 2023-10-04 DIAGNOSIS — M25.572 CHRONIC PAIN OF LEFT ANKLE: ICD-10-CM

## 2023-10-04 DIAGNOSIS — S82.872S CLOSED DISPLACED PILON FRACTURE OF LEFT TIBIA, SEQUELA: ICD-10-CM

## 2023-10-04 DIAGNOSIS — Z98.890 S/P ORIF (OPEN REDUCTION INTERNAL FIXATION) FRACTURE: Primary | ICD-10-CM

## 2023-10-04 DIAGNOSIS — Z87.81 S/P ORIF (OPEN REDUCTION INTERNAL FIXATION) FRACTURE: Primary | ICD-10-CM

## 2023-10-04 DIAGNOSIS — M79.89 SWELLING OF LEFT LOWER EXTREMITY: ICD-10-CM

## 2023-10-04 DIAGNOSIS — Z87.81 S/P ORIF (OPEN REDUCTION INTERNAL FIXATION) FRACTURE: ICD-10-CM

## 2023-10-04 DIAGNOSIS — G89.29 CHRONIC PAIN OF LEFT ANKLE: ICD-10-CM

## 2023-10-04 PROCEDURE — 99213 OFFICE O/P EST LOW 20 MIN: CPT | Performed by: ORTHOPAEDIC SURGERY

## 2023-10-04 PROCEDURE — 73610 X-RAY EXAM OF ANKLE: CPT
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
## 2023-10-05 ENCOUNTER — EVALUATION (OUTPATIENT)
Dept: PHYSICAL THERAPY | Facility: CLINIC | Age: 28
End: 2023-10-05
Payer: OTHER MISCELLANEOUS

## 2023-10-05 DIAGNOSIS — S82.872S CLOSED DISPLACED PILON FRACTURE OF LEFT TIBIA, SEQUELA: Primary | ICD-10-CM

## 2023-10-05 PROCEDURE — 97110 THERAPEUTIC EXERCISES: CPT

## 2023-10-05 PROCEDURE — 97112 NEUROMUSCULAR REEDUCATION: CPT

## 2023-10-05 PROCEDURE — 97530 THERAPEUTIC ACTIVITIES: CPT

## 2023-10-05 PROCEDURE — 97140 MANUAL THERAPY 1/> REGIONS: CPT
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
## 2023-10-11 ENCOUNTER — OFFICE VISIT (OUTPATIENT)
Dept: PHYSICAL THERAPY | Facility: CLINIC | Age: 28
End: 2023-10-11
Payer: OTHER MISCELLANEOUS

## 2023-10-11 DIAGNOSIS — S82.872S CLOSED DISPLACED PILON FRACTURE OF LEFT TIBIA, SEQUELA: Primary | ICD-10-CM

## 2023-10-11 PROCEDURE — 97112 NEUROMUSCULAR REEDUCATION: CPT

## 2023-10-11 PROCEDURE — 97110 THERAPEUTIC EXERCISES: CPT

## 2023-10-11 PROCEDURE — 97140 MANUAL THERAPY 1/> REGIONS: CPT
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
## 2023-10-18 ENCOUNTER — OFFICE VISIT (OUTPATIENT)
Dept: PHYSICAL THERAPY | Facility: CLINIC | Age: 28
End: 2023-10-18
Payer: OTHER MISCELLANEOUS

## 2023-10-18 DIAGNOSIS — S82.872S CLOSED DISPLACED PILON FRACTURE OF LEFT TIBIA, SEQUELA: Primary | ICD-10-CM

## 2023-10-18 PROCEDURE — 97112 NEUROMUSCULAR REEDUCATION: CPT

## 2023-10-18 PROCEDURE — 97110 THERAPEUTIC EXERCISES: CPT
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
## 2023-10-20 ENCOUNTER — OFFICE VISIT (OUTPATIENT)
Dept: PHYSICAL THERAPY | Facility: CLINIC | Age: 28
End: 2023-10-20
Payer: OTHER MISCELLANEOUS

## 2023-10-20 DIAGNOSIS — S82.872S CLOSED DISPLACED PILON FRACTURE OF LEFT TIBIA, SEQUELA: Primary | ICD-10-CM

## 2023-10-20 PROCEDURE — 97110 THERAPEUTIC EXERCISES: CPT

## 2023-10-20 PROCEDURE — 97112 NEUROMUSCULAR REEDUCATION: CPT
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
## 2023-10-25 ENCOUNTER — APPOINTMENT (OUTPATIENT)
Dept: PHYSICAL THERAPY | Facility: CLINIC | Age: 28
End: 2023-10-25
Payer: OTHER MISCELLANEOUS
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
## 2023-10-27 ENCOUNTER — OFFICE VISIT (OUTPATIENT)
Dept: PHYSICAL THERAPY | Facility: CLINIC | Age: 28
End: 2023-10-27
Payer: OTHER MISCELLANEOUS

## 2023-10-27 DIAGNOSIS — S82.872S CLOSED DISPLACED PILON FRACTURE OF LEFT TIBIA, SEQUELA: Primary | ICD-10-CM

## 2023-10-27 PROCEDURE — 97112 NEUROMUSCULAR REEDUCATION: CPT

## 2023-10-27 PROCEDURE — 97110 THERAPEUTIC EXERCISES: CPT

## 2023-10-27 PROCEDURE — 97530 THERAPEUTIC ACTIVITIES: CPT
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
## 2023-11-01 ENCOUNTER — OFFICE VISIT (OUTPATIENT)
Dept: PHYSICAL THERAPY | Facility: CLINIC | Age: 28
End: 2023-11-01
Payer: OTHER MISCELLANEOUS

## 2023-11-01 DIAGNOSIS — S82.872S CLOSED DISPLACED PILON FRACTURE OF LEFT TIBIA, SEQUELA: Primary | ICD-10-CM

## 2023-11-01 PROCEDURE — 97110 THERAPEUTIC EXERCISES: CPT

## 2023-11-01 PROCEDURE — 97530 THERAPEUTIC ACTIVITIES: CPT

## 2023-11-01 PROCEDURE — 97112 NEUROMUSCULAR REEDUCATION: CPT
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
## 2023-11-10 ENCOUNTER — OFFICE VISIT (OUTPATIENT)
Dept: PHYSICAL THERAPY | Facility: CLINIC | Age: 28
End: 2023-11-10
Payer: OTHER MISCELLANEOUS

## 2023-11-10 DIAGNOSIS — S82.872S CLOSED DISPLACED PILON FRACTURE OF LEFT TIBIA, SEQUELA: Primary | ICD-10-CM

## 2023-11-10 PROCEDURE — 97530 THERAPEUTIC ACTIVITIES: CPT

## 2023-11-10 PROCEDURE — 97110 THERAPEUTIC EXERCISES: CPT

## 2023-11-10 PROCEDURE — 97112 NEUROMUSCULAR REEDUCATION: CPT
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
## 2023-11-15 ENCOUNTER — OFFICE VISIT (OUTPATIENT)
Dept: PHYSICAL THERAPY | Facility: CLINIC | Age: 28
End: 2023-11-15
Payer: OTHER MISCELLANEOUS

## 2023-11-15 DIAGNOSIS — S82.872S CLOSED DISPLACED PILON FRACTURE OF LEFT TIBIA, SEQUELA: Primary | ICD-10-CM

## 2023-11-15 PROCEDURE — 97112 NEUROMUSCULAR REEDUCATION: CPT

## 2023-11-15 PROCEDURE — 97110 THERAPEUTIC EXERCISES: CPT

## 2023-11-15 PROCEDURE — 97530 THERAPEUTIC ACTIVITIES: CPT
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
## 2023-12-06 ENCOUNTER — APPOINTMENT (OUTPATIENT)
Dept: PHYSICAL THERAPY | Facility: CLINIC | Age: 28
End: 2023-12-06
Payer: OTHER MISCELLANEOUS

## 2023-12-13 ENCOUNTER — OFFICE VISIT (OUTPATIENT)
Dept: PHYSICAL THERAPY | Facility: CLINIC | Age: 28
End: 2023-12-13
Payer: OTHER MISCELLANEOUS

## 2023-12-13 DIAGNOSIS — S82.872S CLOSED DISPLACED PILON FRACTURE OF LEFT TIBIA, SEQUELA: Primary | ICD-10-CM

## 2023-12-13 PROCEDURE — 97112 NEUROMUSCULAR REEDUCATION: CPT

## 2023-12-13 PROCEDURE — 97010 HOT OR COLD PACKS THERAPY: CPT

## 2023-12-13 PROCEDURE — 97140 MANUAL THERAPY 1/> REGIONS: CPT
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
## 2023-12-20 ENCOUNTER — EVALUATION (OUTPATIENT)
Dept: PHYSICAL THERAPY | Facility: CLINIC | Age: 28
End: 2023-12-20
Payer: OTHER MISCELLANEOUS

## 2023-12-20 DIAGNOSIS — S82.872S CLOSED DISPLACED PILON FRACTURE OF LEFT TIBIA, SEQUELA: Primary | ICD-10-CM

## 2023-12-20 PROCEDURE — 97110 THERAPEUTIC EXERCISES: CPT | Performed by: PHYSICAL THERAPIST

## 2023-12-20 PROCEDURE — 97112 NEUROMUSCULAR REEDUCATION: CPT | Performed by: PHYSICAL THERAPIST

## 2023-12-20 PROCEDURE — 97140 MANUAL THERAPY 1/> REGIONS: CPT | Performed by: PHYSICAL THERAPIST

## 2023-12-20 PROCEDURE — 97164 PT RE-EVAL EST PLAN CARE: CPT | Performed by: PHYSICAL THERAPIST

## 2023-12-20 NOTE — PROGRESS NOTES
PT Re-Evaluation     Today's date: 2023  Patient name: Deep York Jr.  : 1995  MRN: [id number]  Referring provider: Lachman, James R, MD  Dx:   Encounter Diagnosis     ICD-10-CM    1. Closed displaced pilon fracture of left tibia, sequela  S82.872S                    Pain level = 5/10    Ankle/Foot Comments   L ankle AROM  DF: 2 degrees  PF: 40 degrees  Inversion: 40  Eversion: 10     R ankle AROM  DF: 5 degrees  PF: 50 degrees  Inversion: 48  Eversion: 15     LE Strength  L Hip flex: 5/5  R Hip flexion: 5/5  L Knee extension: 5/5  R Knee extension: 5/5  L Hip Abduction: 5/5  R Hip Abduction: 4+/5  L Hip ER: 5/5  R Hip ER: 5/5  L Knee flexion: 4+/5  R Knee flexion: 5/5  DF: 5/5 L  5/5 R  PF: 5/5 bilat (25/25 RLE with minimal height, 14/25 with minimal height limited by pain/burning in posterior tib and fatigue)    L INV/EV MMT: 4+/5 and 4+/5 rep      Assessment: Pt has attended 23 visits of physical therapy s/p L ankle ORIF due to L pilon fracture in 2022. Pt has made some progress with L ankle ROM and L ankle strength.  He continues to have pain, weakness, and reported swelling in his left foot that limits his ability to stand long periods, walk long periods, run, and exercise.  He will benefit from another 1-2 visits then progress to HEP before DC.    Goals  Short term goals (3-4 weeks)  1. Pt will display independence with understanding and performance of HEP to allow for carryover of plan of care at home. (met)  2. Pt will improve FOTO score from initial evaluation to show improvement in pain and function. (unmet)  3. Pt will increase L ankle inversion ROM by 5 degrees to improve ambulation. (met)  4. Pt will increase L ankle DF strength to 5/5 to improve ankle clearance during ambulation. (met)      Long term goals (6-8 weeks)  1. Pt will score equal or better than projected score on FOTO to show improvement in pain and function. (unmet)  2. Pt will increase L ankle PF strength to  20/25 SL HR to improve ambulation. (unmet)  3. Pt will improve L ankle inversion strength to 5/5 to improve ambulation. (unmet)  4. Pt will improve L ankle eversion strength to 5/5 to improve ambulation. (unmet)                  Plan: MARILIN EVANS.       Manuals 10/27 11/1 11/10 11/15 12/13 12/20   Fibular head mob (distal and proximal)      SF   STM       SF   PF stretch L ankle     PK SF            Neuro Re-Ed 10/27 11/1 11/10 11/15 12/13 12/20   Eliptical 6' 6' 6' 8' 8' 8'   Ankle inversion with resistance (HEP) Ball HR standing 2x10 Ball HR standing 2x10 Ball HR standing 2x10 Ball HR standing 2x10 Ball HR standing 3x10 20x BTB   Ankle eversion with resistance (HEP) Purple tb 2x15 Purple tb 2x15 Purple tb 2x15 Purple tb 2x15  20x BTB   DF with resistance Standing TR 2x15 Standing TR 2x15 Standing TR 2x15 Standing TR 2x15 Standing TR 2x15    SL abduction         SLS on foam 2x30 ea side off foam today    rebounder  2x30 ea side off foam today    rebounder  2x30 ea side off foam today    rebounder  2x30 ea side off foam today    rebounder   3x10 airex @ rebounder   Standing ankle stretch     1' x 3    Pt edu     PK SF   Eversion isometrics         Eversion with PF         Ther Ex 10/27 11/1 11/10 11/15  12/20   Seated HR (HEP)         SL HR (HEP) 2x15 ea side  2x15 ea side  2x15 each side 2x15 each side  2x15 ea step   LP         Lateral walks Gtb 3 laps Gtb 3 laps GTB 3 laps GTB 3 laps  BTB laps   DL HR         Anterior tib stretch on knees         Standing          Gastroc st 2x1' slant board 2x1' slant board 2x1' slant board 2x1' slant board     Ther Activity 10/27 11/1 11/10 11/15     Wall sits 2x10 with heel raise 2x10 with heel raise 2x10 with heel raise 2x10 with heel raise     Heel walks 3xat mirror  3xat mirror  3x at mirror 3x at mirror     Toe walking 3xat mirror  3xat mirror 3x at mirror 3x at mirror     Step overs         Gait Training 10/27 11/1  11/15 12/13                      Modalities 10/27 11/1  11/15  "12/13    CP  10'  10\" long sit 10\" long sit                    "

## 2023-12-27 ENCOUNTER — OFFICE VISIT (OUTPATIENT)
Dept: PHYSICAL THERAPY | Facility: CLINIC | Age: 28
End: 2023-12-27
Payer: OTHER MISCELLANEOUS

## 2023-12-27 DIAGNOSIS — S82.872S CLOSED DISPLACED PILON FRACTURE OF LEFT TIBIA, SEQUELA: Primary | ICD-10-CM

## 2023-12-27 PROCEDURE — 97110 THERAPEUTIC EXERCISES: CPT

## 2023-12-27 PROCEDURE — 97112 NEUROMUSCULAR REEDUCATION: CPT

## 2023-12-27 NOTE — PROGRESS NOTES
"Daily Note and Discharge Summary      Today's date: 2023  Patient name: Deep York Jr.  : 1995  MRN: [id number]  Referring provider: Lachman, James R, MD  Dx:   Encounter Diagnosis     ICD-10-CM    1. Closed displaced pilon fracture of left tibia, sequela  S82.872S                      Subjective: \"My ankle is still a little sore\"      Objective: See treatment diary below      Assessment: Tolerated treatment well.  Reviewed HEP exercises and given ways to progress resistance/reps for each. No further questions at this time. Pt provided w/ primary PT business card.       Plan:  D/c to HEP               Manuals 10/27 11/1 11/10 11/15 12/13 12/20 12/27   Fibular head mob (distal and proximal)      SF    STM       SF    PF stretch L ankle     PK SF              Neuro Re-Ed 10/27 11/1 11/10 11/15 12/13 12/20    Eliptical 6' 6' 6' 8' 8' 8' 8'   Ankle inversion with resistance (HEP) Ball HR standing 2x10 Ball HR standing 2x10 Ball HR standing 2x10 Ball HR standing 2x10 Ball HR standing 3x10 20x BTB 3x10 Purple TB   Ankle eversion with resistance (HEP) Purple tb 2x15 Purple tb 2x15 Purple tb 2x15 Purple tb 2x15  20x BTB 3x10 Purple TB   DF with resistance Standing TR 2x15 Standing TR 2x15 Standing TR 2x15 Standing TR 2x15 Standing TR 2x15     SL abduction          SLS on foam 2x30 ea side off foam today    rebounder  2x30 ea side off foam today    rebounder  2x30 ea side off foam today    rebounder  2x30 ea side off foam today    rebounder   3x10 airex @ rebounder 3x10 airex @ rebounder   Standing ankle stretch     1' x 3     Pt edu     PK SF    Eversion isometrics          Eversion with PF          Ther Ex 10/27 11/1 11/10 11/15  12/20    Seated HR (HEP)          SL HR (HEP) 2x15 ea side  2x15 ea side  2x15 each side 2x15 each side  2x15 ea step 2z15 ea off step   LP          Lateral walks Gtb 3 laps Gtb 3 laps GTB 3 laps GTB 3 laps  BTB laps BTB 3 laps   DL HR          Leg press w/ DF stretch at end     " "  135# 3x10   Anterior tib stretch on knees          Standing           Gastroc st 2x1' slant board 2x1' slant board 2x1' slant board 2x1' slant board      Ther Activity 10/27 11/1 11/10 11/15      Wall sits 2x10 with heel raise 2x10 with heel raise 2x10 with heel raise 2x10 with heel raise      Heel walks 3xat mirror  3xat mirror  3x at mirror 3x at mirror      Toe walking 3xat mirror  3xat mirror 3x at mirror 3x at mirror      Step overs          Gait Training 10/27 11/1  11/15 12/13                         Modalities 10/27 11/1  11/15 12/13     CP  10'  10\" long sit 10\" long sit                        "

## 2024-01-10 ENCOUNTER — HOSPITAL ENCOUNTER (OUTPATIENT)
Dept: RADIOLOGY | Facility: HOSPITAL | Age: 29
Discharge: HOME | End: 2024-01-10
Attending: ORTHOPAEDIC SURGERY
Payer: COMMERCIAL

## 2024-01-10 ENCOUNTER — OFFICE VISIT (OUTPATIENT)
Dept: OBGYN CLINIC | Facility: HOSPITAL | Age: 29
End: 2024-01-10
Payer: OTHER MISCELLANEOUS

## 2024-01-10 VITALS
SYSTOLIC BLOOD PRESSURE: 137 MMHG | BODY MASS INDEX: 43.31 KG/M2 | WEIGHT: 309.4 LBS | DIASTOLIC BLOOD PRESSURE: 83 MMHG | HEIGHT: 71 IN | HEART RATE: 55 BPM

## 2024-01-10 DIAGNOSIS — E66.01 MORBID OBESITY WITH BODY MASS INDEX OF 40.0-49.9 (HCC): ICD-10-CM

## 2024-01-10 DIAGNOSIS — S82.872S CLOSED DISPLACED PILON FRACTURE OF LEFT TIBIA, SEQUELA: Primary | ICD-10-CM

## 2024-01-10 DIAGNOSIS — S82.872S CLOSED DISPLACED PILON FRACTURE OF LEFT TIBIA, SEQUELA: ICD-10-CM

## 2024-01-10 PROCEDURE — 99213 OFFICE O/P EST LOW 20 MIN: CPT | Performed by: ORTHOPAEDIC SURGERY

## 2024-01-10 PROCEDURE — 73610 X-RAY EXAM OF ANKLE: CPT

## 2024-01-10 RX ORDER — APIXABAN 5 MG/1
5 TABLET, FILM COATED ORAL 2 TIMES DAILY
COMMUNITY

## 2024-01-10 NOTE — PROGRESS NOTES
Orthopaedics Office Visit - follow-up patient Visit    ASSESSMENT/PLAN:    Assessment:   28-year-old male with history of ORIF for left pilon fracture, DOS 3/3/2022    Plan:   Referral to Jayla Bourgeois for measurement/fitting for custom orthotics bilaterally - likely has element of flatfoot contributing to pain  Recommend wearing supportive sneakers    PT order also placed for new round of therapy for stretching/strengthening. Patient feels he was continuing to gain benefit when it was dc/d late last year    To Do Next Visit:  Eval pain symptoms    _____________________________________________________  CHIEF COMPLAINT:  Chief Complaint   Patient presents with    Left Ankle - Pain         SUBJECTIVE:  Deep York Jr. is a 28 y.o. male who presents for follow-up evaluation of ongoing left ankle pain and stiffness s/p ORIF pilon fracture left distal tibia DOS 3/3/2022. He was last seen regards this issue on 10/4/2023 at which time he was to continue with formal physical therapy and progressive weightbearing activity as tolerated. On today's presentation he reports he has been consistent with physical therapy, his most recent appointment was on 12/27/2023. He states that he has been able to return to most ADLs without significant symptom exacerbation, however he does report occasional sharp pains and swelling at the end of the day. He also reports experiencing uncharacteristic, intense, achy pain last week, seemingly without relation to any specific activity. He is currently taking OTC NSAIDs/Tylenol as needed for pain and soreness. Denies any recent incident of potential exacerbation.    PAST MEDICAL HISTORY:  Past Medical History:   Diagnosis Date    Enchondroma of hand bone     last assessed: 4/21/2015    Heart trouble     Hypertension     Palpitations        PAST SURGICAL HISTORY:  Past Surgical History:   Procedure Laterality Date    EXTERNAL FIXATOR APPLICATION Left 2/25/2022    Procedure: APPLICATION  EXTERNAL FIXATION DEVICE LOWER EXTREMITY;  Surgeon: Omar Cunningham MD;  Location: [facility];  Service: Orthopedics    NO PAST SURGERIES      ORIF TIBIA FRACTURE Left 3/3/2022    Procedure: OPEN REDUCTION W/ INTERNAL FIXATION (ORIF) LEFT TIBIA PILON FRACTURE, REMOVAL OF EXTERNAL FIXATOR;  Surgeon: Omar Cunningham MD;  Location: [facility];  Service: Orthopedics       FAMILY HISTORY:  Family History   Problem Relation Age of Onset    No Known Problems Mother     No Known Problems Father     Autism Brother         autistic diosrder    Diabetes type II Paternal Grandmother         mellitus    Autism Brother         autistic diosrder    Depression Other        SOCIAL HISTORY:  Social History     Tobacco Use    Smoking status: Never    Smokeless tobacco: Never   Vaping Use    Vaping status: Never Used   Substance Use Topics    Alcohol use: No    Drug use: No       MEDICATIONS:    Current Outpatient Medications:     acetaminophen (TYLENOL) 325 mg tablet, Take 3 tablets (975 mg total) by mouth every 8 (eight) hours as needed for mild pain, Disp: , Rfl: 0    albuterol (PROVENTIL HFA,VENTOLIN HFA) 90 mcg/act inhaler, Inhale 2 puffs every 4 (four) hours as needed for wheezing, Disp: , Rfl: 0    atorvastatin (LIPITOR) 20 mg tablet, Take 20 mg by mouth daily, Disp: , Rfl:     benzonatate (TESSALON PERLES) 100 mg capsule, Take 1 capsule (100 mg total) by mouth 3 (three) times a day, Disp: 20 capsule, Rfl: 0    bumetanide (BUMEX) 12.5 mg infusion 50 mL, Inject 0.5 mg/hr into a catheter in a vein at 2 mL/hr continuous, Disp: , Rfl: 0    carvedilol (COREG) 3.125 mg tablet, Take 1 tablet (3.125 mg total) by mouth 2 (two) times a day with meals, Disp: , Rfl: 0    Eliquis 5 MG, Take 5 mg by mouth 2 (two) times a day, Disp: , Rfl:     heparin, porcine, 46227-9.45 UT/250ML-%, Inject 390-3,120 Units/hr into a catheter in a vein at 3.9-31.2 mL/hr titrated, Disp: , Rfl: 0    insulin lispro (HumaLOG) 100 units/mL injection, Inject  "2-12 Units under the skin 3 (three) times a day before meals, Disp: , Rfl: 0    insulin lispro (HumaLOG) 100 units/mL injection, Inject 2-12 Units under the skin daily at bedtime, Disp: , Rfl: 0    ondansetron (ZOFRAN) 4 mg/2 mL injection, Inject 2 mL (4 mg total) into a catheter in a vein every 6 (six) hours as needed for nausea, Disp: , Rfl: 0    sacubitril-valsartan (ENTRESTO) 49-51 MG TABS, Take 1 tablet by mouth 2 (two) times a day, Disp: , Rfl: 0    spironolactone (ALDACTONE) 50 mg tablet, Take 1 tablet (50 mg total) by mouth daily Do not start before February 22, 2023., Disp: , Rfl: 0    cyanocobalamin 1,000 mcg/mL, Inject 1 mL (1,000 mcg total) into a muscle daily for 2 doses Do not start before February 22, 2023., Disp: 1 mL, Rfl: 0    ALLERGIES:  Allergies   Allergen Reactions    Banana - Food Allergy Other (See Comments)          Bee Venom Other (See Comments)          Pollen Extract        REVIEW OF SYSTEMS:  MSK: Left ankle pain and stiffness  Neuro: Negative for numbness, tingling, or paresthesias  Pertinent items are otherwise noted in HPI.  A comprehensive review of systems was otherwise negative.    LABS:  HgA1c:   Lab Results   Component Value Date    HGBA1C 7.4 (H) 02/20/2023     BMP:   Lab Results   Component Value Date    GLUCOSE 204 (H) 03/03/2022    CALCIUM 8.7 02/21/2023    K 4.2 02/21/2023    CO2 26 02/21/2023     02/21/2023    BUN 15 02/21/2023    CREATININE 1.15 02/21/2023     CBC: No components found for: \"CBC\"    _____________________________________________________  PHYSICAL EXAMINATION:  Vital signs: /83   Pulse 55   Ht 5' 11\" (1.803 m)   Wt (!) 140 kg (309 lb 6.4 oz)   BMI 43.15 kg/m²   General: No acute distress, awake and alert  Psychiatric: Mood and affect appear appropriate  HEENT: Trachea Midline, No torticollis, no apparent facial trauma  Cardiovascular: No audible murmurs; Extremities appear perfused  Pulmonary: No audible wheezing or stridor  Skin: No open " lesions; see further details (if any) below    MUSCULOSKELETAL EXAMINATION:  Left ankle -   Patient ambulates with steady gait pattern  Uses no assistive device  No anatomical deformity  Skin is warm and dry to touch with no signs of erythema, ecchymosis, infection  Mild soft tissue swelling, no effusion noted  ROM DF 0-5, PF 0-15, Inv 0-10, Ev neutral  TTP over calcaneal fibula joint space, minimally TTP over tibial plafond  Nontender over distal fibula, nontender over fibular head, nontender over medial malleolus  MMT 5/5 throughout  - anterior drawer  - medial talar tilt, - lateral talar tilt  - syndesmotic squeeze  - Homans' sign  Calf compartments are soft and supple  2+ TP and DP pulses with brisk capillary refill to the toes  Sural, saphenous, tibial, superficial and deep peroneal motor and sensory distributions intact  Sensation to light touch intact distally      _____________________________________________________  STUDIES REVIEWED:  Attending Physician has personally reviewed pertinent imaging in PACS, impression is as follows:    Review of radiographic series taken 1/10/2024 of the left ankle shows well-healed pilon fracture of the distal tibia with surgical hardware to be appropriately in place and intact. Joint alignment is well-maintained.      PROCEDURES PERFORMED:  Procedures    Scribe Attestation      I,:  Daryl Garcias am acting as a scribe while in the presence of the attending physician.:       I,:  Omar Cunningham MD personally performed the services described in this documentation    as scribed in my presence.:

## 2024-01-22 ENCOUNTER — EVALUATION (OUTPATIENT)
Dept: PHYSICAL THERAPY | Facility: OTHER | Age: 29
End: 2024-01-22
Payer: OTHER MISCELLANEOUS

## 2024-01-22 DIAGNOSIS — S82.872S CLOSED DISPLACED PILON FRACTURE OF LEFT TIBIA, SEQUELA: ICD-10-CM

## 2024-01-22 PROCEDURE — 97760 ORTHOTIC MGMT&TRAING 1ST ENC: CPT | Performed by: PHYSICAL THERAPIST

## 2024-01-22 NOTE — PROGRESS NOTES
Orthotic Evaluation    Today's date: 24  Patient name: Deep York Jr.  : 1995  MRN: [id number]  Referring provider: Omar Cunningham MD  Dx:   Encounter Diagnosis     ICD-10-CM    1. Closed displaced pilon fracture of left tibia, sequela  S82.872S Ambulatory Referral to Physical Therapy                      Subjective:    Deep presents today for orthotic evaluation. he complains of pain, decreased joint mobility, balance dysfunction, and ambulatory dysfunction with functional activities including ambulation and leisure. The patient plans to use his orthotic for walking and standing. The orthotic will be placed in a standard, size 12 sneaker. Patient reports he underwent surgery to repair a fracture about 2 years ago. States he has aches and pains, especially with change in weather. Patient reports he is not currently working. He describes sharp pains in his foot with walking, no pain at rest. Patient typically wears slides primarily, but also utilizes timberlands and Jordans.     Objective:    Age: 28 y.o.  Weight: 300    Foot Posture Index Score    Right Foot  Talar dome - +2  Malleolar curve - +1   Calcaneal eversion - +1  Talonavicular bulge - +2  Medial longitudinal arch - +2  Too many toes - +2  Total Score R = 10    Left Foot  Talar dome - +2  Malleolar curve - +1   Calcaneal eversion - +2  Talonavicular bulge - +2  Medial longitudinal arch - +2  Too many toes - +1  Total Score L = 10    Reference Values  Normal =    0 to +5  Pronated =  +6 to +9 Highly Pronated =  10+  Supinated =   -1 to -4 Highly Supinated = -5 to -12    Objective     Joint Play   Left Ankle/Foot  Joints within functional limits are the talocrural joint, subtalar joint, midfoot and forefoot.     Right Ankle/Foot  Joints within functional limits are the talocrural joint, subtalar joint, midfoot and forefoot.     General Comments:      Ankle/Foot Comments   Gait- unshod, early excessive pronation  b/l      Assessment:    Patient requires custom foot orthosis with deepened heel cup and medial posting to correct poor foot posture and altered gait mechanics. Patient is not currently controlled by his current shoe wear. Patient was educated on the design of the custom orthotic and it's ability to offload his current pathology. Patient was also educated on potential shoe wear changes with device, break-in period, and skin checks to avoid potential skin break down.     Orthotic goals:  1) Patient will have custom foot orthoses fitted to his shoe.   2) Patient will be compliant with break-in period of custom foot orthoses as prescribed by PT.   3) Patient will be compliant with custom foot orthoses use as prescribed by PT.     Plan:    Planned therapy interventions: orthotic fitting/training  Duration in visits: 2

## 2024-01-31 ENCOUNTER — TELEPHONE (OUTPATIENT)
Age: 29
End: 2024-01-31

## 2024-01-31 NOTE — TELEPHONE ENCOUNTER
Caller: [employer]     Doctor: ORTHO    Reason for call: CALLING TO CONFIRM DATES OF CARE FOR A RECORD REQUEST    Call back#: N/A

## 2024-02-08 ENCOUNTER — APPOINTMENT (OUTPATIENT)
Dept: PHYSICAL THERAPY | Facility: OTHER | Age: 29
End: 2024-02-08
Payer: OTHER MISCELLANEOUS

## 2024-02-15 ENCOUNTER — OFFICE VISIT (OUTPATIENT)
Dept: PHYSICAL THERAPY | Facility: OTHER | Age: 29
End: 2024-02-15
Payer: OTHER MISCELLANEOUS

## 2024-02-15 DIAGNOSIS — S82.872S CLOSED DISPLACED PILON FRACTURE OF LEFT TIBIA, SEQUELA: Primary | ICD-10-CM

## 2024-02-15 PROCEDURE — L3010 FOOT LONGITUDINAL ARCH SUPPO: HCPCS | Performed by: PHYSICAL THERAPIST

## 2024-02-15 NOTE — PROGRESS NOTES
Custom orthotics fitted to patients shoe and dispensed. Patient educated on appropriate break in period. Patient will follow up if any future problems arise.

## 2024-03-13 VITALS
BODY MASS INDEX: 43.06 KG/M2 | WEIGHT: 307.6 LBS | HEART RATE: 78 BPM | HEIGHT: 71 IN | DIASTOLIC BLOOD PRESSURE: 77 MMHG | SYSTOLIC BLOOD PRESSURE: 132 MMHG

## 2024-03-13 DIAGNOSIS — Z98.890 S/P ORIF (OPEN REDUCTION INTERNAL FIXATION) FRACTURE: Primary | ICD-10-CM

## 2024-03-13 DIAGNOSIS — Z87.81 S/P ORIF (OPEN REDUCTION INTERNAL FIXATION) FRACTURE: Primary | ICD-10-CM

## 2024-03-13 PROCEDURE — 99213 OFFICE O/P EST LOW 20 MIN: CPT | Performed by: ORTHOPAEDIC SURGERY

## 2024-03-13 NOTE — PROGRESS NOTES
Orthopaedics Office Visit - Follow up Patient Visit    ASSESSMENT/PLAN:    Assessment:   29yo male with history of ORIF for left pilon fracture, DOS 3/3/2022  Persistent known lateral ankle pain, however improved after dedicated PT course and custom orthotics    Plan:   - Continue progressive return to ADLs with use of orthotics  - Continue HEP for ankle ROM, stretching, and strengthening  - Has settled worker's comp claim  - Will focus on health and look for work moving forward as symptoms allow   - Continue OTC Tylenol as needed for pain as soreness    To Do Next Visit:  Re-evaluation of current issue    _____________________________________________________  CHIEF COMPLAINT:  Chief Complaint   Patient presents with    Left Ankle - Follow-up         SUBJECTIVE:  Deep York Jr. is a 28 y.o. male who presents for follow-up evaluation of ongoing left ankle pain and stiffness s/p ORIF pilon fracture left distal tibia DOS 3/3/2022. He was last seen regards this issue on 1/10/2024, at which time he was taking continue physical therapy, was referred for custom orthotic shoe insert fitting. Patient states that he has been consistent with physical therapy. He states that he also did obtain his orthotics, and has been using them progressively as instructed. On today's presentation he states that he feels his pain is overall improving, but continues to have pain with weightbearing activity and exertional activity in the lateral aspect of the left ankle. He denies any recent bruising, swelling, numbness, or tingling. He is currently using Tylenol OTC as needed for pain and soreness which she says is mostly effective.    PAST MEDICAL HISTORY:  Past Medical History:   Diagnosis Date    Enchondroma of hand bone     last assessed: 4/21/2015    Heart trouble     Hypertension     Palpitations        PAST SURGICAL HISTORY:  Past Surgical History:   Procedure Laterality Date    EXTERNAL FIXATOR APPLICATION Left 2/25/2022     Procedure: APPLICATION EXTERNAL FIXATION DEVICE LOWER EXTREMITY;  Surgeon: Omar Cunningham MD;  Location: [facility];  Service: Orthopedics    NO PAST SURGERIES      ORIF TIBIA FRACTURE Left 3/3/2022    Procedure: OPEN REDUCTION W/ INTERNAL FIXATION (ORIF) LEFT TIBIA PILON FRACTURE, REMOVAL OF EXTERNAL FIXATOR;  Surgeon: Omar Cunningham MD;  Location: [facility];  Service: Orthopedics       FAMILY HISTORY:  Family History   Problem Relation Age of Onset    No Known Problems Mother     No Known Problems Father     Autism Brother         autistic diosrder    Diabetes type II Paternal Grandmother         mellitus    Autism Brother         autistic diosrder    Depression Other        SOCIAL HISTORY:  Social History     Tobacco Use    Smoking status: Never    Smokeless tobacco: Never   Vaping Use    Vaping status: Never Used   Substance Use Topics    Alcohol use: No    Drug use: No       MEDICATIONS:    Current Outpatient Medications:     acetaminophen (TYLENOL) 325 mg tablet, Take 3 tablets (975 mg total) by mouth every 8 (eight) hours as needed for mild pain, Disp: , Rfl: 0    albuterol (PROVENTIL HFA,VENTOLIN HFA) 90 mcg/act inhaler, Inhale 2 puffs every 4 (four) hours as needed for wheezing, Disp: , Rfl: 0    atorvastatin (LIPITOR) 20 mg tablet, Take 20 mg by mouth daily, Disp: , Rfl:     benzonatate (TESSALON PERLES) 100 mg capsule, Take 1 capsule (100 mg total) by mouth 3 (three) times a day, Disp: 20 capsule, Rfl: 0    bumetanide (BUMEX) 12.5 mg infusion 50 mL, Inject 0.5 mg/hr into a catheter in a vein at 2 mL/hr continuous, Disp: , Rfl: 0    carvedilol (COREG) 3.125 mg tablet, Take 1 tablet (3.125 mg total) by mouth 2 (two) times a day with meals, Disp: , Rfl: 0    Eliquis 5 MG, Take 5 mg by mouth 2 (two) times a day, Disp: , Rfl:     heparin, porcine, 37356-7.45 UT/250ML-%, Inject 390-3,120 Units/hr into a catheter in a vein at 3.9-31.2 mL/hr titrated, Disp: , Rfl: 0    insulin lispro (HumaLOG) 100  "units/mL injection, Inject 2-12 Units under the skin 3 (three) times a day before meals, Disp: , Rfl: 0    insulin lispro (HumaLOG) 100 units/mL injection, Inject 2-12 Units under the skin daily at bedtime, Disp: , Rfl: 0    ondansetron (ZOFRAN) 4 mg/2 mL injection, Inject 2 mL (4 mg total) into a catheter in a vein every 6 (six) hours as needed for nausea, Disp: , Rfl: 0    sacubitril-valsartan (ENTRESTO) 49-51 MG TABS, Take 1 tablet by mouth 2 (two) times a day, Disp: , Rfl: 0    spironolactone (ALDACTONE) 50 mg tablet, Take 1 tablet (50 mg total) by mouth daily Do not start before February 22, 2023., Disp: , Rfl: 0    cyanocobalamin 1,000 mcg/mL, Inject 1 mL (1,000 mcg total) into a muscle daily for 2 doses Do not start before February 22, 2023., Disp: 1 mL, Rfl: 0    ALLERGIES:  Allergies   Allergen Reactions    Banana - Food Allergy Other (See Comments)          Bee Venom Other (See Comments)          Pollen Extract        REVIEW OF SYSTEMS:  MSK: As noted in HPI  Neuro: Negative for numbness, tingling, or paresthesias  Pertinent items are otherwise noted in HPI.  A comprehensive review of systems was otherwise negative.    LABS:  HgA1c:   Lab Results   Component Value Date    HGBA1C 7.4 (H) 02/20/2023     BMP:   Lab Results   Component Value Date    GLUCOSE 204 (H) 03/03/2022    CALCIUM 9.7 03/05/2024    K 4.5 03/05/2024    CO2 27 03/05/2024     03/05/2024    BUN 11 03/05/2024    CREATININE 1.16 03/05/2024     CBC: No components found for: \"CBC\"    _____________________________________________________  PHYSICAL EXAMINATION:  Vital signs: /77   Pulse 78   Ht 5' 11\" (1.803 m)   Wt (!) 140 kg (307 lb 9.6 oz)   BMI 42.90 kg/m²   General: No acute distress, awake and alert  Psychiatric: Mood and affect appear appropriate  HEENT: Trachea Midline, No torticollis, no apparent facial trauma  Cardiovascular: No audible murmurs; Extremities appear perfused  Pulmonary: No audible wheezing or stridor  Skin: " No open lesions; see further details (if any) below    MUSCULOSKELETAL EXAMINATION:  Left ankle -   Patient ambulates with steady gait pattern  Uses no assistive device  Pes planus anatomical deformity  Skin is warm and dry to touch with no signs of erythema, ecchymosis, infection  Mild generalized soft tissue swelling, no effusion noted  ROM DF 0-10, PF 0-20, Inv 0-10, Ev 0-10  TTP over calcaneal fibula joint space, minimally TTP over tibial plafond  Nontender over distal fibula, nontender over fibular head, nontender over medial malleolus  MMT 5/5 throughout  - anterior drawer  - medial talar tilt, - lateral talar tilt  - syndesmotic squeeze  - Homans' sign  Calf compartments are soft and supple  2+ TP and DP pulses with brisk capillary refill to the toes  Sural, saphenous, tibial, superficial and deep peroneal motor and sensory distributions intact  Sensation to light touch intact distally      _____________________________________________________  STUDIES REVIEWED:  I personally reviewed the images and interpretation is as follows:      PROCEDURES PERFORMED:  Procedures  No procedures performed this visit    Scribe Attestation      I,:  Daryl Garcias am acting as a scribe while in the presence of the attending physician.:       I,:  Omar Cunningham MD personally performed the services described in this documentation    as scribed in my presence.:

## (undated) DEVICE — 3M™ STERI-STRIP™ REINFORCED ADHESIVE SKIN CLOSURES, R1547, 1/2 IN X 4 IN (12 MM X 100 MM), 6 STRIPS/ENVELOPE: Brand: 3M™ STERI-STRIP™

## (undated) DEVICE — MEDI-VAC YANK SUCT HNDL W/TPRD BULBOUS TIP: Brand: CARDINAL HEALTH

## (undated) DEVICE — SPONGE PVP SCRUB WING STERILE

## (undated) DEVICE — IMPERVIOUS STOCKINETTE: Brand: DEROYAL

## (undated) DEVICE — DRAPE C-ARM X-RAY

## (undated) DEVICE — PAD GROUNDING ADULT

## (undated) DEVICE — SUT VICRYL PLUS 2-0 CTB-1 27 IN VCPB259H

## (undated) DEVICE — UNIVERSAL MAJOR EXTREMITY,KIT: Brand: CARDINAL HEALTH

## (undated) DEVICE — PROXIMATE PLUS MD MULTI-DIRECTIONAL RELEASE SKIN STAPLERS CONTAINS 35 STAINLESS STEEL STAPLES APPROXIMATE CLOSED DIMENSIONS: 6.9MM X 3.9MM WIDE: Brand: PROXIMATE

## (undated) DEVICE — 2.5MM DRILL BIT QC 135MM 45MM CALIBRATION

## (undated) DEVICE — ABDOMINAL PAD: Brand: DERMACEA

## (undated) DEVICE — SPONGE SCRUB 4 PCT CHLORHEXIDINE

## (undated) DEVICE — GLOVE INDICATOR PI UNDERGLOVE SZ 8 BLUE

## (undated) DEVICE — PADDING CAST 4 IN  COTTON STRL

## (undated) DEVICE — 4.0MM CANCELLOUS BONE SCREW FULLY THREADED/16MM
Type: IMPLANTABLE DEVICE | Site: TIBIA | Status: NON-FUNCTIONAL
Removed: 2022-03-03

## (undated) DEVICE — GLOVE SRG BIOGEL ORTHOPEDIC 8

## (undated) DEVICE — DRAPE SHEET THREE QUARTER

## (undated) DEVICE — INTENDED FOR TISSUE SEPARATION, AND OTHER PROCEDURES THAT REQUIRE A SHARP SURGICAL BLADE TO PUNCTURE OR CUT.: Brand: BARD-PARKER SAFETY BLADES SIZE 15, STERILE

## (undated) DEVICE — SUT ETHILON 3-0 FSLX 30 IN 1673H

## (undated) DEVICE — 3M™ IOBAN™ 2 ANTIMICROBIAL INCISE DRAPE 6650EZ: Brand: IOBAN™ 2

## (undated) DEVICE — 3.5MM LOCKING SCREW SLF-TPNG W/STARDRIVE(TM) RECESS 14MM
Type: IMPLANTABLE DEVICE | Site: TIBIA | Status: NON-FUNCTIONAL
Removed: 2022-03-03

## (undated) DEVICE — 2.0MM DRILL BIT/QC/125MM

## (undated) DEVICE — SUT VICRYL PLUS 0 CTB-1 27 IN VCPB260H

## (undated) DEVICE — DRAPE C-ARMOUR

## (undated) DEVICE — ACE WRAP 6 IN UNSTERILE

## (undated) DEVICE — 1.6MM KIRSCHNER WIRE W/TROCAR POINT 150MM
Type: IMPLANTABLE DEVICE | Site: TIBIA | Status: NON-FUNCTIONAL
Removed: 2022-03-03

## (undated) DEVICE — INTENDED FOR TISSUE SEPARATION, AND OTHER PROCEDURES THAT REQUIRE A SHARP SURGICAL BLADE TO PUNCTURE OR CUT.: Brand: BARD-PARKER SAFETY BLADES SIZE 10, STERILE

## (undated) DEVICE — 2.8MM PERCUTANEOUS DRILL BIT F/LCP® PL QC/200MM/100MM CALIB: Brand: LCP

## (undated) DEVICE — CHLORAPREP HI-LITE 26ML ORANGE

## (undated) DEVICE — 2.0MM KIRSCHNER WIRE W/TROCAR POINT 150MM
Type: IMPLANTABLE DEVICE | Site: TIBIA | Status: NON-FUNCTIONAL
Removed: 2022-03-03

## (undated) DEVICE — 3M™ STERI-STRIP™ REINFORCED ADHESIVE SKIN CLOSURES, R1541, 1/4 IN X 3 IN (6 MM X 75 MM), 3 STRIPS/ENVELOPE: Brand: 3M™ STERI-STRIP™

## (undated) DEVICE — PLUMEPEN PRO 10FT

## (undated) DEVICE — PROXIMATE SKIN STAPLERS (35 WIDE) CONTAINS 35 STAINLESS STEEL STAPLES (FIXED HEAD): Brand: PROXIMATE

## (undated) DEVICE — KERLIX BANDAGE ROLL: Brand: KERLIX

## (undated) DEVICE — 2.7MM THREE-FLUTED DRILL BIT QC/125MM

## (undated) DEVICE — DRESSING XEROFORM 5 X 9

## (undated) DEVICE — GAUZE SPONGES,16 PLY: Brand: CURITY

## (undated) DEVICE — 2.5MM DRILL BIT/QC/GOLD/110MM

## (undated) DEVICE — BULB SYRINGE,IRRIGATION WITH PROTECTIVE CAP: Brand: DOVER

## (undated) DEVICE — ELECTRODE BLADE MOD E-Z CLEAN 2.5IN 6.4CM -0012M

## (undated) DEVICE — CURITY NON-ADHERENT STRIPS: Brand: CURITY

## (undated) DEVICE — BANDAGE, ESMARK LF STR 6"X9' (20/CS): Brand: CYPRESS

## (undated) DEVICE — 3.5MM LOCKING SCREW SLF-TPNG W/STARDRIVE(TM) RECESS 16MM
Type: IMPLANTABLE DEVICE | Site: TIBIA | Status: NON-FUNCTIONAL
Removed: 2022-03-03

## (undated) DEVICE — 2.5MM DRILL BIT/QC/GOLD/180MM

## (undated) DEVICE — 3.5MM DRILL BIT/QC/195MM

## (undated) DEVICE — BETHLEHEM UNIV MAJ EXT ,KIT: Brand: CARDINAL HEALTH